# Patient Record
Sex: FEMALE | Race: WHITE | NOT HISPANIC OR LATINO | Employment: UNEMPLOYED | URBAN - METROPOLITAN AREA
[De-identification: names, ages, dates, MRNs, and addresses within clinical notes are randomized per-mention and may not be internally consistent; named-entity substitution may affect disease eponyms.]

---

## 2017-01-26 ENCOUNTER — CONVERSION ENCOUNTER (OUTPATIENT)
Dept: GENERAL RADIOLOGY | Facility: HOSPITAL | Age: 77
End: 2017-01-26

## 2018-05-31 ENCOUNTER — OFFICE VISIT CONVERTED (OUTPATIENT)
Dept: ORTHOPEDIC SURGERY | Facility: CLINIC | Age: 78
End: 2018-05-31
Attending: ORTHOPAEDIC SURGERY

## 2018-06-21 ENCOUNTER — OFFICE VISIT CONVERTED (OUTPATIENT)
Dept: ORTHOPEDIC SURGERY | Facility: CLINIC | Age: 78
End: 2018-06-21
Attending: ORTHOPAEDIC SURGERY

## 2018-08-21 ENCOUNTER — OFFICE VISIT CONVERTED (OUTPATIENT)
Dept: ORTHOPEDIC SURGERY | Facility: CLINIC | Age: 78
End: 2018-08-21
Attending: ORTHOPAEDIC SURGERY

## 2018-09-11 ENCOUNTER — CONVERSION ENCOUNTER (OUTPATIENT)
Dept: ORTHOPEDIC SURGERY | Facility: CLINIC | Age: 78
End: 2018-09-11

## 2018-09-11 ENCOUNTER — OFFICE VISIT CONVERTED (OUTPATIENT)
Dept: ORTHOPEDIC SURGERY | Facility: CLINIC | Age: 78
End: 2018-09-11
Attending: ORTHOPAEDIC SURGERY

## 2018-11-13 ENCOUNTER — OFFICE VISIT CONVERTED (OUTPATIENT)
Dept: ORTHOPEDIC SURGERY | Facility: CLINIC | Age: 78
End: 2018-11-13
Attending: PHYSICIAN ASSISTANT

## 2018-11-13 ENCOUNTER — CONVERSION ENCOUNTER (OUTPATIENT)
Dept: ORTHOPEDIC SURGERY | Facility: CLINIC | Age: 78
End: 2018-11-13

## 2018-12-04 ENCOUNTER — OFFICE VISIT CONVERTED (OUTPATIENT)
Dept: ORTHOPEDIC SURGERY | Facility: CLINIC | Age: 78
End: 2018-12-04
Attending: PHYSICIAN ASSISTANT

## 2019-01-09 ENCOUNTER — HOSPITAL ENCOUNTER (OUTPATIENT)
Dept: LAB | Facility: HOSPITAL | Age: 79
Discharge: HOME | End: 2019-01-09
Attending: INTERNAL MEDICINE

## 2019-01-09 ENCOUNTER — HOSPITAL ENCOUNTER (OUTPATIENT)
Dept: GENERAL RADIOLOGY | Facility: HOSPITAL | Age: 79
Discharge: HOME | End: 2019-01-09
Attending: PHYSICIAN ASSISTANT

## 2019-01-09 LAB
25(OH)D3 SERPL-MCNC: 54.6 NG/ML (ref 30–100)
ANION GAP SERPL CALC-SCNC: 18 MMOL/L (ref 8–19)
BUN SERPL-MCNC: 13 MG/DL (ref 5–25)
BUN/CREAT SERPL: 14 {RATIO} (ref 6–20)
CALCIUM SERPL-MCNC: 9.1 MG/DL (ref 8.7–10.4)
CHLORIDE SERPL-SCNC: 101 MMOL/L (ref 99–111)
CONV CO2: 27 MMOL/L (ref 22–32)
CREAT UR-MCNC: 0.9 MG/DL (ref 0.5–0.9)
GFR SERPLBLD BASED ON 1.73 SQ M-ARVRAT: >60 ML/MIN/{1.73_M2}
GLUCOSE SERPL-MCNC: 97 MG/DL (ref 65–99)
OSMOLALITY SERPL CALC.SUM OF ELEC: 294 MOSM/KG (ref 273–304)
POTASSIUM SERPL-SCNC: 3.5 MMOL/L (ref 3.5–5.3)
SODIUM SERPL-SCNC: 142 MMOL/L (ref 135–147)

## 2019-01-15 ENCOUNTER — OFFICE VISIT CONVERTED (OUTPATIENT)
Dept: ORTHOPEDIC SURGERY | Facility: CLINIC | Age: 79
End: 2019-01-15
Attending: PHYSICIAN ASSISTANT

## 2019-01-18 ENCOUNTER — HOSPITAL ENCOUNTER (OUTPATIENT)
Dept: GENERAL RADIOLOGY | Facility: HOSPITAL | Age: 79
Discharge: HOME | End: 2019-01-18
Attending: INTERNAL MEDICINE

## 2019-01-28 ENCOUNTER — HOSPITAL ENCOUNTER (OUTPATIENT)
Dept: INFUSION THERAPY | Facility: HOSPITAL | Age: 79
Setting detail: RECURRING SERIES
Discharge: HOME | End: 2019-01-31
Attending: INTERNAL MEDICINE

## 2019-02-04 ENCOUNTER — HOSPITAL ENCOUNTER (OUTPATIENT)
Dept: PHYSICAL THERAPY | Facility: CLINIC | Age: 79
Setting detail: RECURRING SERIES
Discharge: HOME | End: 2019-02-28
Attending: INTERNAL MEDICINE

## 2019-03-11 ENCOUNTER — HOSPITAL ENCOUNTER (OUTPATIENT)
Dept: GENERAL RADIOLOGY | Facility: HOSPITAL | Age: 79
Discharge: HOME | End: 2019-03-11
Attending: PHYSICIAN ASSISTANT

## 2019-03-19 ENCOUNTER — OFFICE VISIT CONVERTED (OUTPATIENT)
Dept: ORTHOPEDIC SURGERY | Facility: CLINIC | Age: 79
End: 2019-03-19
Attending: PHYSICIAN ASSISTANT

## 2019-04-11 ENCOUNTER — HOSPITAL ENCOUNTER (OUTPATIENT)
Dept: GENERAL RADIOLOGY | Facility: HOSPITAL | Age: 79
Discharge: HOME | End: 2019-04-11
Attending: INTERNAL MEDICINE

## 2019-04-11 LAB
ALBUMIN SERPL-MCNC: 4.4 G/DL (ref 3.5–5)
ALBUMIN/GLOB SERPL: 1.6 {RATIO} (ref 1.4–2.6)
ALP SERPL-CCNC: 62 U/L (ref 43–160)
ALT SERPL-CCNC: 16 U/L (ref 10–40)
ANION GAP SERPL CALC-SCNC: 18 MMOL/L (ref 8–19)
AST SERPL-CCNC: 26 U/L (ref 15–50)
BASOPHILS # BLD AUTO: 0.08 10*3/UL (ref 0–0.2)
BASOPHILS NFR BLD AUTO: 1.7 % (ref 0–3)
BILIRUB SERPL-MCNC: 0.37 MG/DL (ref 0.2–1.3)
BUN SERPL-MCNC: 18 MG/DL (ref 5–25)
BUN/CREAT SERPL: 24 {RATIO} (ref 6–20)
CALCIUM SERPL-MCNC: 9 MG/DL (ref 8.7–10.4)
CHLORIDE SERPL-SCNC: 99 MMOL/L (ref 99–111)
CHOLEST SERPL-MCNC: 242 MG/DL (ref 107–200)
CHOLEST/HDLC SERPL: 3.1 {RATIO} (ref 3–6)
CONV ABS IMM GRAN: 0.01 10*3/UL (ref 0–0.2)
CONV CO2: 26 MMOL/L (ref 22–32)
CONV IMMATURE GRAN: 0.2 % (ref 0–1.8)
CONV TOTAL PROTEIN: 7.2 G/DL (ref 6.3–8.2)
CREAT UR-MCNC: 0.75 MG/DL (ref 0.5–0.9)
DEPRECATED RDW RBC AUTO: 45.3 FL (ref 36.4–46.3)
EOSINOPHIL # BLD AUTO: 0.2 10*3/UL (ref 0–0.7)
EOSINOPHIL # BLD AUTO: 4.2 % (ref 0–7)
ERYTHROCYTE [DISTWIDTH] IN BLOOD BY AUTOMATED COUNT: 13 % (ref 11.7–14.4)
GFR SERPLBLD BASED ON 1.73 SQ M-ARVRAT: >60 ML/MIN/{1.73_M2}
GLOBULIN UR ELPH-MCNC: 2.8 G/DL (ref 2–3.5)
GLUCOSE SERPL-MCNC: 90 MG/DL (ref 65–99)
HBA1C MFR BLD: 11.9 G/DL (ref 12–16)
HCT VFR BLD AUTO: 36.5 % (ref 37–47)
HDLC SERPL-MCNC: 78 MG/DL (ref 40–60)
LDLC SERPL CALC-MCNC: 141 MG/DL (ref 70–100)
LYMPHOCYTES # BLD AUTO: 1.83 10*3/UL (ref 1–5)
MCH RBC QN AUTO: 31.1 PG (ref 27–31)
MCHC RBC AUTO-ENTMCNC: 32.6 G/DL (ref 33–37)
MCV RBC AUTO: 95.3 FL (ref 81–99)
MONOCYTES # BLD AUTO: 0.46 10*3/UL (ref 0.2–1.2)
MONOCYTES NFR BLD AUTO: 9.7 % (ref 3–10)
NEUTROPHILS # BLD AUTO: 2.18 10*3/UL (ref 2–8)
NEUTROPHILS NFR BLD AUTO: 45.8 % (ref 30–85)
NRBC CBCN: 0 % (ref 0–0.7)
OSMOLALITY SERPL CALC.SUM OF ELEC: 289 MOSM/KG (ref 273–304)
PLATELET # BLD AUTO: 266 10*3/UL (ref 130–400)
PMV BLD AUTO: 10.5 FL (ref 9.4–12.3)
POTASSIUM SERPL-SCNC: 3.5 MMOL/L (ref 3.5–5.3)
PTH-INTACT SERPL-MCNC: 48.1 PG/ML (ref 11.1–79.5)
RBC # BLD AUTO: 3.83 10*6/UL (ref 4.2–5.4)
SODIUM SERPL-SCNC: 139 MMOL/L (ref 135–147)
TRIGL SERPL-MCNC: 116 MG/DL (ref 40–150)
VARIANT LYMPHS NFR BLD MANUAL: 38.4 % (ref 20–45)
VLDLC SERPL-MCNC: 23 MG/DL (ref 5–37)
WBC # BLD AUTO: 4.76 10*3/UL (ref 4.8–10.8)

## 2019-04-20 ENCOUNTER — HOSPITAL ENCOUNTER (OUTPATIENT)
Dept: OTHER | Facility: HOSPITAL | Age: 79
Discharge: HOME | End: 2019-04-20
Attending: PHYSICAL MEDICINE & REHABILITATION

## 2019-04-20 LAB
25(OH)D3 SERPL-MCNC: 39.7 NG/ML (ref 30–100)
ALBUMIN SERPL-MCNC: 3.4 G/DL (ref 3.5–5)
ALBUMIN/GLOB SERPL: 1.4 {RATIO} (ref 1.4–2.6)
ALP SERPL-CCNC: 57 U/L (ref 43–160)
ALT SERPL-CCNC: <5 U/L (ref 10–40)
ANION GAP SERPL CALC-SCNC: 13 MMOL/L (ref 8–19)
AST SERPL-CCNC: 27 U/L (ref 15–50)
BASOPHILS # BLD AUTO: 0.03 10*3/UL (ref 0–0.2)
BASOPHILS NFR BLD AUTO: 0.5 % (ref 0–3)
BILIRUB SERPL-MCNC: 0.31 MG/DL (ref 0.2–1.3)
BUN SERPL-MCNC: 15 MG/DL (ref 5–25)
BUN/CREAT SERPL: 17 {RATIO} (ref 6–20)
CALCIUM SERPL-MCNC: 8.5 MG/DL (ref 8.7–10.4)
CHLORIDE SERPL-SCNC: 100 MMOL/L (ref 99–111)
CONV ABS IMM GRAN: 0.02 10*3/UL (ref 0–0.2)
CONV CO2: 27 MMOL/L (ref 22–32)
CONV IMMATURE GRAN: 0.3 % (ref 0–1.8)
CONV TOTAL PROTEIN: 5.9 G/DL (ref 6.3–8.2)
CREAT UR-MCNC: 0.86 MG/DL (ref 0.5–0.9)
DEPRECATED RDW RBC AUTO: 44.8 FL (ref 36.4–46.3)
EOSINOPHIL # BLD AUTO: 0.18 10*3/UL (ref 0–0.7)
EOSINOPHIL # BLD AUTO: 2.8 % (ref 0–7)
ERYTHROCYTE [DISTWIDTH] IN BLOOD BY AUTOMATED COUNT: 12.8 % (ref 11.7–14.4)
GFR SERPLBLD BASED ON 1.73 SQ M-ARVRAT: >60 ML/MIN/{1.73_M2}
GLOBULIN UR ELPH-MCNC: 2.5 G/DL (ref 2–3.5)
GLUCOSE SERPL-MCNC: 85 MG/DL (ref 65–99)
HBA1C MFR BLD: 9.4 G/DL (ref 12–16)
HCT VFR BLD AUTO: 28.3 % (ref 37–47)
LYMPHOCYTES # BLD AUTO: 0.95 10*3/UL (ref 1–5)
MCH RBC QN AUTO: 31.6 PG (ref 27–31)
MCHC RBC AUTO-ENTMCNC: 33.2 G/DL (ref 33–37)
MCV RBC AUTO: 95.3 FL (ref 81–99)
MONOCYTES # BLD AUTO: 0.69 10*3/UL (ref 0.2–1.2)
MONOCYTES NFR BLD AUTO: 10.6 % (ref 3–10)
NEUTROPHILS # BLD AUTO: 4.63 10*3/UL (ref 2–8)
NEUTROPHILS NFR BLD AUTO: 71.2 % (ref 30–85)
NRBC CBCN: 0 % (ref 0–0.7)
OSMOLALITY SERPL CALC.SUM OF ELEC: 282 MOSM/KG (ref 273–304)
PLATELET # BLD AUTO: 197 10*3/UL (ref 130–400)
PMV BLD AUTO: 10.9 FL (ref 9.4–12.3)
POTASSIUM SERPL-SCNC: 4.1 MMOL/L (ref 3.5–5.3)
RBC # BLD AUTO: 2.97 10*6/UL (ref 4.2–5.4)
SODIUM SERPL-SCNC: 136 MMOL/L (ref 135–147)
VARIANT LYMPHS NFR BLD MANUAL: 14.6 % (ref 20–45)
WBC # BLD AUTO: 6.5 10*3/UL (ref 4.8–10.8)

## 2019-04-22 ENCOUNTER — HOSPITAL ENCOUNTER (OUTPATIENT)
Dept: OTHER | Facility: HOSPITAL | Age: 79
Discharge: HOME | End: 2019-04-22

## 2019-04-22 LAB
BASOPHILS # BLD AUTO: 0.04 10*3/UL (ref 0–0.2)
BASOPHILS NFR BLD AUTO: 0.5 % (ref 0–3)
CONV ABS IMM GRAN: 0.03 10*3/UL (ref 0–0.2)
CONV IMMATURE GRAN: 0.4 % (ref 0–1.8)
DEPRECATED RDW RBC AUTO: 44.9 FL (ref 36.4–46.3)
EOSINOPHIL # BLD AUTO: 0.4 10*3/UL (ref 0–0.7)
EOSINOPHIL # BLD AUTO: 5.4 % (ref 0–7)
ERYTHROCYTE [DISTWIDTH] IN BLOOD BY AUTOMATED COUNT: 12.7 % (ref 11.7–14.4)
HBA1C MFR BLD: 10.2 G/DL (ref 12–16)
HCT VFR BLD AUTO: 32.2 % (ref 37–47)
LYMPHOCYTES # BLD AUTO: 2.25 10*3/UL (ref 1–5)
MCH RBC QN AUTO: 30.4 PG (ref 27–31)
MCHC RBC AUTO-ENTMCNC: 31.7 G/DL (ref 33–37)
MCV RBC AUTO: 95.8 FL (ref 81–99)
MONOCYTES # BLD AUTO: 0.82 10*3/UL (ref 0.2–1.2)
MONOCYTES NFR BLD AUTO: 11 % (ref 3–10)
NEUTROPHILS # BLD AUTO: 3.93 10*3/UL (ref 2–8)
NEUTROPHILS NFR BLD AUTO: 52.6 % (ref 30–85)
NRBC CBCN: 0 % (ref 0–0.7)
PLATELET # BLD AUTO: 262 10*3/UL (ref 130–400)
PMV BLD AUTO: 10.5 FL (ref 9.4–12.3)
RBC # BLD AUTO: 3.36 10*6/UL (ref 4.2–5.4)
VARIANT LYMPHS NFR BLD MANUAL: 30.1 % (ref 20–45)
WBC # BLD AUTO: 7.47 10*3/UL (ref 4.8–10.8)

## 2019-04-29 ENCOUNTER — HOSPITAL ENCOUNTER (OUTPATIENT)
Dept: GENERAL RADIOLOGY | Facility: HOSPITAL | Age: 79
Discharge: HOME | End: 2019-04-29
Attending: PHYSICIAN ASSISTANT

## 2019-04-30 ENCOUNTER — OFFICE VISIT CONVERTED (OUTPATIENT)
Dept: ORTHOPEDIC SURGERY | Facility: CLINIC | Age: 79
End: 2019-04-30
Attending: ORTHOPAEDIC SURGERY

## 2019-05-07 ENCOUNTER — OFFICE VISIT CONVERTED (OUTPATIENT)
Dept: ORTHOPEDIC SURGERY | Facility: CLINIC | Age: 79
End: 2019-05-07
Attending: PHYSICIAN ASSISTANT

## 2019-06-11 ENCOUNTER — OFFICE VISIT CONVERTED (OUTPATIENT)
Dept: ORTHOPEDIC SURGERY | Facility: CLINIC | Age: 79
End: 2019-06-11
Attending: PHYSICIAN ASSISTANT

## 2019-07-01 ENCOUNTER — HOSPITAL ENCOUNTER (OUTPATIENT)
Dept: GENERAL RADIOLOGY | Facility: HOSPITAL | Age: 79
Discharge: HOME | End: 2019-07-01
Attending: INTERNAL MEDICINE

## 2019-07-01 LAB
ANION GAP SERPL CALC-SCNC: 16 MMOL/L (ref 8–19)
BASOPHILS # BLD AUTO: 0.05 10*3/UL (ref 0–0.2)
BASOPHILS NFR BLD AUTO: 1 % (ref 0–3)
BUN SERPL-MCNC: 15 MG/DL (ref 5–25)
BUN/CREAT SERPL: 19 {RATIO} (ref 6–20)
CALCIUM SERPL-MCNC: 9 MG/DL (ref 8.7–10.4)
CHLORIDE SERPL-SCNC: 100 MMOL/L (ref 99–111)
CONV ABS IMM GRAN: 0.01 10*3/UL (ref 0–0.2)
CONV CO2: 26 MMOL/L (ref 22–32)
CONV IMMATURE GRAN: 0.2 % (ref 0–1.8)
CREAT UR-MCNC: 0.81 MG/DL (ref 0.5–0.9)
DEPRECATED RDW RBC AUTO: 42.7 FL (ref 36.4–46.3)
EOSINOPHIL # BLD AUTO: 0.11 10*3/UL (ref 0–0.7)
EOSINOPHIL # BLD AUTO: 2.1 % (ref 0–7)
ERYTHROCYTE [DISTWIDTH] IN BLOOD BY AUTOMATED COUNT: 12.4 % (ref 11.7–14.4)
FERRITIN SERPL-MCNC: 41 NG/ML (ref 10–200)
GFR SERPLBLD BASED ON 1.73 SQ M-ARVRAT: >60 ML/MIN/{1.73_M2}
GLUCOSE SERPL-MCNC: 91 MG/DL (ref 65–99)
HBA1C MFR BLD: 12.2 G/DL (ref 12–16)
HCT VFR BLD AUTO: 37.7 % (ref 37–47)
IRON SATN MFR SERPL: 21 % (ref 20–55)
IRON SERPL-MCNC: 81 UG/DL (ref 60–170)
LYMPHOCYTES # BLD AUTO: 1.81 10*3/UL (ref 1–5)
MCH RBC QN AUTO: 30.4 PG (ref 27–31)
MCHC RBC AUTO-ENTMCNC: 32.4 G/DL (ref 33–37)
MCV RBC AUTO: 94 FL (ref 81–99)
MONOCYTES # BLD AUTO: 0.49 10*3/UL (ref 0.2–1.2)
MONOCYTES NFR BLD AUTO: 9.3 % (ref 3–10)
NEUTROPHILS # BLD AUTO: 2.78 10*3/UL (ref 2–8)
NEUTROPHILS NFR BLD AUTO: 52.9 % (ref 30–85)
NRBC CBCN: 0 % (ref 0–0.7)
OSMOLALITY SERPL CALC.SUM OF ELEC: 286 MOSM/KG (ref 273–304)
PLATELET # BLD AUTO: 225 10*3/UL (ref 130–400)
PMV BLD AUTO: 10.2 FL (ref 9.4–12.3)
POTASSIUM SERPL-SCNC: 4.1 MMOL/L (ref 3.5–5.3)
RBC # BLD AUTO: 4.01 10*6/UL (ref 4.2–5.4)
SODIUM SERPL-SCNC: 138 MMOL/L (ref 135–147)
TIBC SERPL-MCNC: 385 UG/DL (ref 245–450)
TRANSFERRIN SERPL-MCNC: 269 MG/DL (ref 250–380)
VARIANT LYMPHS NFR BLD MANUAL: 34.5 % (ref 20–45)
WBC # BLD AUTO: 5.25 10*3/UL (ref 4.8–10.8)

## 2019-07-23 ENCOUNTER — CONVERSION ENCOUNTER (OUTPATIENT)
Dept: ORTHOPEDIC SURGERY | Facility: CLINIC | Age: 79
End: 2019-07-23

## 2019-07-23 ENCOUNTER — OFFICE VISIT CONVERTED (OUTPATIENT)
Dept: ORTHOPEDIC SURGERY | Facility: CLINIC | Age: 79
End: 2019-07-23
Attending: PHYSICIAN ASSISTANT

## 2019-09-27 ENCOUNTER — OFFICE VISIT CONVERTED (OUTPATIENT)
Dept: ORTHOPEDIC SURGERY | Facility: CLINIC | Age: 79
End: 2019-09-27
Attending: ORTHOPAEDIC SURGERY

## 2019-09-27 ENCOUNTER — CONVERSION ENCOUNTER (OUTPATIENT)
Dept: ORTHOPEDIC SURGERY | Facility: CLINIC | Age: 79
End: 2019-09-27

## 2019-10-09 ENCOUNTER — HOSPITAL ENCOUNTER (OUTPATIENT)
Dept: LAB | Facility: HOSPITAL | Age: 79
Discharge: HOME | End: 2019-10-09
Attending: INTERNAL MEDICINE

## 2019-10-09 LAB
25(OH)D3 SERPL-MCNC: 67.6 NG/ML (ref 30–100)
ALBUMIN SERPL-MCNC: 4.4 G/DL (ref 3.5–5)
ALBUMIN/GLOB SERPL: 1.6 {RATIO} (ref 1.4–2.6)
ALP SERPL-CCNC: 65 U/L (ref 43–160)
ALT SERPL-CCNC: 11 U/L (ref 10–40)
ANION GAP SERPL CALC-SCNC: 17 MMOL/L (ref 8–19)
AST SERPL-CCNC: 19 U/L (ref 15–50)
BASOPHILS # BLD AUTO: 0.06 10*3/UL (ref 0–0.2)
BASOPHILS NFR BLD AUTO: 1.1 % (ref 0–3)
BILIRUB SERPL-MCNC: 0.34 MG/DL (ref 0.2–1.3)
BUN SERPL-MCNC: 18 MG/DL (ref 5–25)
BUN/CREAT SERPL: 21 {RATIO} (ref 6–20)
CALCIUM SERPL-MCNC: 9.3 MG/DL (ref 8.7–10.4)
CHLORIDE SERPL-SCNC: 103 MMOL/L (ref 99–111)
CHOLEST SERPL-MCNC: 227 MG/DL (ref 107–200)
CHOLEST/HDLC SERPL: 2.4 {RATIO} (ref 3–6)
CONV ABS IMM GRAN: 0.02 10*3/UL (ref 0–0.2)
CONV CO2: 25 MMOL/L (ref 22–32)
CONV IMMATURE GRAN: 0.4 % (ref 0–1.8)
CONV TOTAL PROTEIN: 7.2 G/DL (ref 6.3–8.2)
CREAT UR-MCNC: 0.84 MG/DL (ref 0.5–0.9)
DEPRECATED RDW RBC AUTO: 43.8 FL (ref 36.4–46.3)
EOSINOPHIL # BLD AUTO: 0.1 10*3/UL (ref 0–0.7)
EOSINOPHIL # BLD AUTO: 1.9 % (ref 0–7)
ERYTHROCYTE [DISTWIDTH] IN BLOOD BY AUTOMATED COUNT: 12.7 % (ref 11.7–14.4)
GFR SERPLBLD BASED ON 1.73 SQ M-ARVRAT: >60 ML/MIN/{1.73_M2}
GLOBULIN UR ELPH-MCNC: 2.8 G/DL (ref 2–3.5)
GLUCOSE SERPL-MCNC: 90 MG/DL (ref 65–99)
HCT VFR BLD AUTO: 36.2 % (ref 37–47)
HDLC SERPL-MCNC: 95 MG/DL (ref 40–60)
HGB BLD-MCNC: 11.6 G/DL (ref 12–16)
IRON SATN MFR SERPL: 27 % (ref 20–55)
IRON SERPL-MCNC: 108 UG/DL (ref 60–170)
LDLC SERPL CALC-MCNC: 121 MG/DL (ref 70–100)
LYMPHOCYTES # BLD AUTO: 1.93 10*3/UL (ref 1–5)
LYMPHOCYTES NFR BLD AUTO: 35.8 % (ref 20–45)
MCH RBC QN AUTO: 30.2 PG (ref 27–31)
MCHC RBC AUTO-ENTMCNC: 32 G/DL (ref 33–37)
MCV RBC AUTO: 94.3 FL (ref 81–99)
MONOCYTES # BLD AUTO: 0.54 10*3/UL (ref 0.2–1.2)
MONOCYTES NFR BLD AUTO: 10 % (ref 3–10)
NEUTROPHILS # BLD AUTO: 2.74 10*3/UL (ref 2–8)
NEUTROPHILS NFR BLD AUTO: 50.8 % (ref 30–85)
NRBC CBCN: 0 % (ref 0–0.7)
OSMOLALITY SERPL CALC.SUM OF ELEC: 293 MOSM/KG (ref 273–304)
PLATELET # BLD AUTO: 212 10*3/UL (ref 130–400)
PMV BLD AUTO: 10.2 FL (ref 9.4–12.3)
POTASSIUM SERPL-SCNC: 4 MMOL/L (ref 3.5–5.3)
PTH-INTACT SERPL-MCNC: 40.4 PG/ML (ref 11.1–79.5)
RBC # BLD AUTO: 3.84 10*6/UL (ref 4.2–5.4)
SODIUM SERPL-SCNC: 141 MMOL/L (ref 135–147)
TIBC SERPL-MCNC: 396 UG/DL (ref 245–450)
TRANSFERRIN SERPL-MCNC: 277 MG/DL (ref 250–380)
TRIGL SERPL-MCNC: 55 MG/DL (ref 40–150)
VLDLC SERPL-MCNC: 11 MG/DL (ref 5–37)
WBC # BLD AUTO: 5.39 10*3/UL (ref 4.8–10.8)

## 2019-11-22 ENCOUNTER — HOSPITAL ENCOUNTER (OUTPATIENT)
Dept: GENERAL RADIOLOGY | Facility: HOSPITAL | Age: 79
Discharge: HOME | End: 2019-11-22
Attending: INTERNAL MEDICINE

## 2020-02-04 ENCOUNTER — HOSPITAL ENCOUNTER (OUTPATIENT)
Dept: INFUSION THERAPY | Facility: HOSPITAL | Age: 80
Discharge: HOME | End: 2020-02-04
Attending: INTERNAL MEDICINE

## 2020-02-04 LAB
ANION GAP SERPL CALC-SCNC: 15 MMOL/L (ref 8–19)
BUN SERPL-MCNC: 14 MG/DL (ref 5–25)
BUN/CREAT SERPL: 18 {RATIO} (ref 6–20)
CALCIUM SERPL-MCNC: 9 MG/DL (ref 8.7–10.4)
CHLORIDE SERPL-SCNC: 103 MMOL/L (ref 99–111)
CONV CO2: 25 MMOL/L (ref 22–32)
CREAT UR-MCNC: 0.77 MG/DL (ref 0.5–0.9)
GFR SERPLBLD BASED ON 1.73 SQ M-ARVRAT: >60 ML/MIN/{1.73_M2}
GLUCOSE SERPL-MCNC: 93 MG/DL (ref 65–99)
OSMOLALITY SERPL CALC.SUM OF ELEC: 288 MOSM/KG (ref 273–304)
POTASSIUM SERPL-SCNC: 3.9 MMOL/L (ref 3.5–5.3)
SODIUM SERPL-SCNC: 139 MMOL/L (ref 135–147)

## 2020-04-08 ENCOUNTER — HOSPITAL ENCOUNTER (OUTPATIENT)
Dept: GENERAL RADIOLOGY | Facility: HOSPITAL | Age: 80
Discharge: HOME | End: 2020-04-08
Attending: INTERNAL MEDICINE

## 2020-04-08 LAB
25(OH)D3 SERPL-MCNC: 57 NG/ML (ref 30–100)
ANION GAP SERPL CALC-SCNC: 20 MMOL/L (ref 8–19)
BASOPHILS # BLD AUTO: 0.07 10*3/UL (ref 0–0.2)
BASOPHILS NFR BLD AUTO: 1.1 % (ref 0–3)
BUN SERPL-MCNC: 19 MG/DL (ref 5–25)
BUN/CREAT SERPL: 19 {RATIO} (ref 6–20)
CALCIUM SERPL-MCNC: 9.6 MG/DL (ref 8.7–10.4)
CHLORIDE SERPL-SCNC: 102 MMOL/L (ref 99–111)
CONV ABS IMM GRAN: 0.02 10*3/UL (ref 0–0.2)
CONV CO2: 23 MMOL/L (ref 22–32)
CONV IMMATURE GRAN: 0.3 % (ref 0–1.8)
CREAT UR-MCNC: 1.01 MG/DL (ref 0.5–0.9)
DEPRECATED RDW RBC AUTO: 41.2 FL (ref 36.4–46.3)
EOSINOPHIL # BLD AUTO: 0.15 10*3/UL (ref 0–0.7)
EOSINOPHIL # BLD AUTO: 2.4 % (ref 0–7)
ERYTHROCYTE [DISTWIDTH] IN BLOOD BY AUTOMATED COUNT: 11.9 % (ref 11.7–14.4)
GFR SERPLBLD BASED ON 1.73 SQ M-ARVRAT: 53 ML/MIN/{1.73_M2}
GLUCOSE SERPL-MCNC: 101 MG/DL (ref 65–99)
HCT VFR BLD AUTO: 36.9 % (ref 37–47)
HGB BLD-MCNC: 11.9 G/DL (ref 12–16)
LYMPHOCYTES # BLD AUTO: 2.27 10*3/UL (ref 1–5)
LYMPHOCYTES NFR BLD AUTO: 37 % (ref 20–45)
MCH RBC QN AUTO: 30.2 PG (ref 27–31)
MCHC RBC AUTO-ENTMCNC: 32.2 G/DL (ref 33–37)
MCV RBC AUTO: 93.7 FL (ref 81–99)
MONOCYTES # BLD AUTO: 0.59 10*3/UL (ref 0.2–1.2)
MONOCYTES NFR BLD AUTO: 9.6 % (ref 3–10)
NEUTROPHILS # BLD AUTO: 3.04 10*3/UL (ref 2–8)
NEUTROPHILS NFR BLD AUTO: 49.6 % (ref 30–85)
NRBC CBCN: 0 % (ref 0–0.7)
OSMOLALITY SERPL CALC.SUM OF ELEC: 294 MOSM/KG (ref 273–304)
PLATELET # BLD AUTO: 303 10*3/UL (ref 130–400)
PMV BLD AUTO: 10.4 FL (ref 9.4–12.3)
POTASSIUM SERPL-SCNC: 4.1 MMOL/L (ref 3.5–5.3)
RBC # BLD AUTO: 3.94 10*6/UL (ref 4.2–5.4)
SODIUM SERPL-SCNC: 141 MMOL/L (ref 135–147)
WBC # BLD AUTO: 6.14 10*3/UL (ref 4.8–10.8)

## 2020-05-14 ENCOUNTER — OFFICE VISIT CONVERTED (OUTPATIENT)
Dept: ORTHOPEDIC SURGERY | Facility: CLINIC | Age: 80
End: 2020-05-14
Attending: ORTHOPAEDIC SURGERY

## 2020-10-02 ENCOUNTER — HOSPITAL ENCOUNTER (OUTPATIENT)
Dept: LAB | Facility: HOSPITAL | Age: 80
Discharge: HOME | End: 2020-10-02
Attending: INTERNAL MEDICINE

## 2020-10-02 ENCOUNTER — HOSPITAL ENCOUNTER (OUTPATIENT)
Dept: GENERAL RADIOLOGY | Facility: HOSPITAL | Age: 80
Discharge: HOME | End: 2020-10-02
Attending: INTERNAL MEDICINE

## 2020-10-02 LAB
25(OH)D3 SERPL-MCNC: 58.3 NG/ML (ref 30–100)
ALBUMIN SERPL-MCNC: 4.1 G/DL (ref 3.5–5)
ALBUMIN/GLOB SERPL: 1.3 {RATIO} (ref 1.4–2.6)
ALP SERPL-CCNC: 85 U/L (ref 43–160)
ALT SERPL-CCNC: 10 U/L (ref 10–40)
ANION GAP SERPL CALC-SCNC: 19 MMOL/L (ref 8–19)
AST SERPL-CCNC: 21 U/L (ref 15–50)
BASOPHILS # BLD AUTO: 0.06 10*3/UL (ref 0–0.2)
BASOPHILS NFR BLD AUTO: 0.7 % (ref 0–3)
BILIRUB SERPL-MCNC: 0.34 MG/DL (ref 0.2–1.3)
BUN SERPL-MCNC: 14 MG/DL (ref 5–25)
BUN/CREAT SERPL: 15 {RATIO} (ref 6–20)
CALCIUM SERPL-MCNC: 9.2 MG/DL (ref 8.7–10.4)
CHLORIDE SERPL-SCNC: 101 MMOL/L (ref 99–111)
CONV ABS IMM GRAN: 0.03 10*3/UL (ref 0–0.2)
CONV CO2: 22 MMOL/L (ref 22–32)
CONV IMMATURE GRAN: 0.4 % (ref 0–1.8)
CONV TOTAL PROTEIN: 7.2 G/DL (ref 6.3–8.2)
CREAT UR-MCNC: 0.93 MG/DL (ref 0.5–0.9)
DEPRECATED RDW RBC AUTO: 41.9 FL (ref 36.4–46.3)
EOSINOPHIL # BLD AUTO: 0.15 10*3/UL (ref 0–0.7)
EOSINOPHIL # BLD AUTO: 1.8 % (ref 0–7)
ERYTHROCYTE [DISTWIDTH] IN BLOOD BY AUTOMATED COUNT: 12.1 % (ref 11.7–14.4)
GFR SERPLBLD BASED ON 1.73 SQ M-ARVRAT: 58 ML/MIN/{1.73_M2}
GLOBULIN UR ELPH-MCNC: 3.1 G/DL (ref 2–3.5)
GLUCOSE SERPL-MCNC: 101 MG/DL (ref 65–99)
HCT VFR BLD AUTO: 33.9 % (ref 37–47)
HGB BLD-MCNC: 11.2 G/DL (ref 12–16)
LYMPHOCYTES # BLD AUTO: 1.4 10*3/UL (ref 1–5)
LYMPHOCYTES NFR BLD AUTO: 16.9 % (ref 20–45)
MCH RBC QN AUTO: 30.9 PG (ref 27–31)
MCHC RBC AUTO-ENTMCNC: 33 G/DL (ref 33–37)
MCV RBC AUTO: 93.6 FL (ref 81–99)
MONOCYTES # BLD AUTO: 0.89 10*3/UL (ref 0.2–1.2)
MONOCYTES NFR BLD AUTO: 10.7 % (ref 3–10)
NEUTROPHILS # BLD AUTO: 5.76 10*3/UL (ref 2–8)
NEUTROPHILS NFR BLD AUTO: 69.5 % (ref 30–85)
NRBC CBCN: 0 % (ref 0–0.7)
OSMOLALITY SERPL CALC.SUM OF ELEC: 287 MOSM/KG (ref 273–304)
PLATELET # BLD AUTO: 252 10*3/UL (ref 130–400)
PMV BLD AUTO: 9.8 FL (ref 9.4–12.3)
POTASSIUM SERPL-SCNC: 4 MMOL/L (ref 3.5–5.3)
RBC # BLD AUTO: 3.62 10*6/UL (ref 4.2–5.4)
SODIUM SERPL-SCNC: 138 MMOL/L (ref 135–147)
T4 FREE SERPL-MCNC: 1 NG/DL (ref 0.9–1.8)
TSH SERPL-ACNC: 2.27 M[IU]/L (ref 0.27–4.2)
WBC # BLD AUTO: 8.29 10*3/UL (ref 4.8–10.8)

## 2020-10-03 LAB — PTH-INTACT SERPL-MCNC: 36 PG/ML (ref 15–65)

## 2020-10-12 ENCOUNTER — HOSPITAL ENCOUNTER (OUTPATIENT)
Dept: GENERAL RADIOLOGY | Facility: HOSPITAL | Age: 80
Discharge: HOME | End: 2020-10-12
Attending: INTERNAL MEDICINE

## 2020-12-18 ENCOUNTER — OFFICE VISIT CONVERTED (OUTPATIENT)
Dept: ORTHOPEDIC SURGERY | Facility: CLINIC | Age: 80
End: 2020-12-18
Attending: ORTHOPAEDIC SURGERY

## 2021-04-15 ENCOUNTER — HOSPITAL ENCOUNTER (OUTPATIENT)
Dept: GENERAL RADIOLOGY | Facility: HOSPITAL | Age: 81
Discharge: HOME | End: 2021-04-15
Attending: INTERNAL MEDICINE

## 2021-04-15 LAB
ALBUMIN SERPL-MCNC: 4.4 G/DL (ref 3.5–5)
ALBUMIN/GLOB SERPL: 1.6 {RATIO} (ref 1.4–2.6)
ALP SERPL-CCNC: 70 U/L (ref 43–160)
ALT SERPL-CCNC: 14 U/L (ref 10–40)
ANION GAP SERPL CALC-SCNC: 16 MMOL/L (ref 8–19)
AST SERPL-CCNC: 25 U/L (ref 15–50)
BASOPHILS # BLD AUTO: 0.06 10*3/UL (ref 0–0.2)
BASOPHILS NFR BLD AUTO: 1.2 % (ref 0–3)
BILIRUB SERPL-MCNC: 0.38 MG/DL (ref 0.2–1.3)
BUN SERPL-MCNC: 16 MG/DL (ref 5–25)
BUN/CREAT SERPL: 18 {RATIO} (ref 6–20)
CALCIUM SERPL-MCNC: 9 MG/DL (ref 8.7–10.4)
CHLORIDE SERPL-SCNC: 103 MMOL/L (ref 99–111)
CHOLEST SERPL-MCNC: 251 MG/DL (ref 107–200)
CHOLEST/HDLC SERPL: 3 {RATIO} (ref 3–6)
CONV ABS IMM GRAN: 0.01 10*3/UL (ref 0–0.2)
CONV CO2: 26 MMOL/L (ref 22–32)
CONV IMMATURE GRAN: 0.2 % (ref 0–1.8)
CONV TOTAL PROTEIN: 7.2 G/DL (ref 6.3–8.2)
CREAT UR-MCNC: 0.9 MG/DL (ref 0.5–0.9)
DEPRECATED RDW RBC AUTO: 44 FL (ref 36.4–46.3)
EOSINOPHIL # BLD AUTO: 0.14 10*3/UL (ref 0–0.7)
EOSINOPHIL # BLD AUTO: 2.8 % (ref 0–7)
ERYTHROCYTE [DISTWIDTH] IN BLOOD BY AUTOMATED COUNT: 12.6 % (ref 11.7–14.4)
FERRITIN SERPL-MCNC: 61 NG/ML (ref 10–200)
GFR SERPLBLD BASED ON 1.73 SQ M-ARVRAT: >60 ML/MIN/{1.73_M2}
GLOBULIN UR ELPH-MCNC: 2.8 G/DL (ref 2–3.5)
GLUCOSE SERPL-MCNC: 87 MG/DL (ref 65–99)
HCT VFR BLD AUTO: 37.1 % (ref 37–47)
HDLC SERPL-MCNC: 83 MG/DL (ref 40–60)
HGB BLD-MCNC: 12 G/DL (ref 12–16)
IRON SATN MFR SERPL: 29 % (ref 20–55)
IRON SERPL-MCNC: 106 UG/DL (ref 60–170)
LDLC SERPL CALC-MCNC: 147 MG/DL (ref 70–100)
LYMPHOCYTES # BLD AUTO: 1.36 10*3/UL (ref 1–5)
LYMPHOCYTES NFR BLD AUTO: 27.4 % (ref 20–45)
MCH RBC QN AUTO: 30.8 PG (ref 27–31)
MCHC RBC AUTO-ENTMCNC: 32.3 G/DL (ref 33–37)
MCV RBC AUTO: 95.4 FL (ref 81–99)
MONOCYTES # BLD AUTO: 0.48 10*3/UL (ref 0.2–1.2)
MONOCYTES NFR BLD AUTO: 9.7 % (ref 3–10)
NEUTROPHILS # BLD AUTO: 2.92 10*3/UL (ref 2–8)
NEUTROPHILS NFR BLD AUTO: 58.7 % (ref 30–85)
NRBC CBCN: 0 % (ref 0–0.7)
OSMOLALITY SERPL CALC.SUM OF ELEC: 293 MOSM/KG (ref 273–304)
PLATELET # BLD AUTO: 254 10*3/UL (ref 130–400)
PMV BLD AUTO: 10.5 FL (ref 9.4–12.3)
POTASSIUM SERPL-SCNC: 4 MMOL/L (ref 3.5–5.3)
RBC # BLD AUTO: 3.89 10*6/UL (ref 4.2–5.4)
SODIUM SERPL-SCNC: 141 MMOL/L (ref 135–147)
TIBC SERPL-MCNC: 365 UG/DL (ref 245–450)
TRANSFERRIN SERPL-MCNC: 255 MG/DL (ref 250–380)
TRIGL SERPL-MCNC: 103 MG/DL (ref 40–150)
VLDLC SERPL-MCNC: 21 MG/DL (ref 5–37)
WBC # BLD AUTO: 4.97 10*3/UL (ref 4.8–10.8)

## 2021-05-10 NOTE — H&P
History and Physical      Patient Name: Breanna York   Patient ID: [id number]   Sex: Female   YOB: 1940        Visit Date: December 18, 2020    Provider: Candice Vo MD   Location: [facility]   Location Address: [address] [place] [address]  [id number]   Location Phone: [phone]          Chief Complaint  · Left Knee Pain      History Of Present Illness  Breanna York is a 80 year old /White female who presents today to [facility].      Patient presents today for an evaluation of left knee pain. Patient has been having left knee pain that has been on and off several years. Patient states that recently her pain has gotten increasingly worse the last few months. She states that the pain is on the back off her knee that radiates to the back of her leg and into her toes. She says she has to be careful when she stands up to walk because her knee will buckle. She can't full extend and flex her knee but somedays it won't due to pain.       Past Medical History  Anemia, Unspecified; Arthritis; Cancer; Hyperlipemia; Hypertension; Impingement syndrome of right shoulder; Limb Swelling; Primary osteoarthritis of both hips; Primary osteoarthritis of right knee; Thyroid disorder; Ulcer         Past Surgical History  Artificial Joints/Limbs; Colonoscopy; Eye Implant; Hysterectomy; Joint Surgery         Medication List  amlodipine 5 mg oral tablet; Neurontin 100 mg oral capsule; Vitamin D3 50 mcg (2,000 unit) oral tablet; Voltaren 1 % topical gel         Allergy List  Demerol; Latex Exam Gloves       Allergies Reconciled  Family Medical History  Heart Disease; Cancer, Unspecified; Family history of certain chronic disabling diseases; arthritis; Osteoporosis; Family history of Arthritis         Social History  Alcohol Use (Never); lives alone; Recreational Drug Use (Never); Retired.; Tobacco (Never);          Review of Systems  · Constitutional  o Denies  o : fever,  "chills, weight loss  · Cardiovascular  o Denies  o : chest pain, shortness of breath  · Gastrointestinal  o Denies  o : liver disease, heartburn, nausea, blood in stools  · Genitourinary  o Denies  o : painful urination, blood in urine  · Integument  o Denies  o : rash, itching  · Neurologic  o Denies  o : headache, weakness, loss of consciousness  · Musculoskeletal  o Denies  o : painful, swollen joints  · Psychiatric  o Denies  o : drug/alcohol addiction, anxiety, depression      Vitals  Date Time BP Position Site L\R Cuff Size HR RR TEMP (F) WT  HT  BMI kg/m2 BSA m2 O2 Sat FR L/min FiO2        12/18/2020 09:28 AM      97 - R   125lbs 0oz 5'  2\" 22.86 1.57 99 %            Physical Examination  · Constitutional  o Appearance  o : well developed, well-nourished, no obvious deformities present  · Head and Face  o Head  o :   § Inspection  § : normocephalic  o Face  o :   § Inspection  § : no facial lesions  · Eyes  o Conjunctivae  o : conjunctivae normal  o Sclerae  o : sclerae white  · Ears, Nose, Mouth and Throat  o Ears  o :   § External Ears  § : appearance within normal limits  § Hearing  § : intact  o Nose  o :   § External Nose  § : appearance normal  · Neck  o Inspection/Palpation  o : normal appearance  o Range of Motion  o : full range of motion  · Respiratory  o Respiratory Effort  o : breathing unlabored  o Inspection of Chest  o : normal appearance  o Auscultation of Lungs  o : no audible wheezing or rales  · Cardiovascular  o Heart  o : regular rate  · Gastrointestinal  o Abdominal Examination  o : soft and non-tender  · Skin and Subcutaneous Tissue  o General Inspection  o : intact, no rashes  · Psychiatric  o General  o : Alert and oriented x3  o Judgement and Insight  o : judgment and insight intact  o Mood and Affect  o : mood normal, affect appropriate  · Left Knee  o Inspection  o : Sensation grossly intact. Neurovascular intact. Skin intact. Calf supple, non-tender. Good strength in quadriceps, " hamstrings, dorsiflexors, and plantar flexors. Full weightbearing. Dorsal Pedal Pulse 2+, posterior tibialis pulse 2+. Near full flexion and extension. Hamstring tenderness. No swelling, skin discoloration or atrophy. Antalgic gait. No tenderness at medial joint line, no tenderness at lateral joint line, no patellar tendon tenderness, no pain of MCL, no pain at LCL.   · Injection Note/Aspiration Note  o Site  o : IM  o Procedure  o : Procedure: After educating the patient, patient gave consent for the procedure. After using alcohol prep, injection was given. The patient tolerated the procedure well.   o Medication  o : 2ml's of 4 mg Dexamethasone   · In Office Procedures  o View  o : LAT/SUNRISE/STANDING   o Site  o : left, knee  o Indication  o : Left knee pain   o Study  o : X-rays ordered, taken in the office, and reviewed today.  o Xray  o : Degenerative changes present that is consistent with osteoarthritis. No fracture or dislocation.           Assessment  · Primary osteoarthritis of right knee     715.16/M17.11  · Left knee pain, unspecified chronicity     719.46/M25.562  · Left Hamstring Tendinitis     726.90/M77.9      Plan  · Orders  o 2.00 - Dexamethasone Injection 8mg (-7) - - 12/18/2020   Lot [id number] Exp 12 2021 [employer] Administered by PAUL Parra RT R  o IM - Injection Fee [facility] ([id number]) - - 12/18/2020  o Knee (Left) [facility] Preferred View ([id number]) - 719.46/M25.562 - 12/18/2020  · Medications  o Medications have been Reconciled  o Transition of Care or Provider Policy  · Instructions  o Dr. Vo saw and examined the patient and agrees with plan.   o X-rays reviewed by Dr. Vo.  o Reviewed the patient's Past Medical, Social, and Family history as well as the ROS at today's visit, no changes.  o Call or return if worsening symptoms.  o Exercise handout given.  o Follow Up PRN.  o This note was transcribed by Marija Ordaz.   o Discussed diagnosis and treatment options with the patient. Patient  opted for Diclofenac gel. Patient opted for an injection and tolerated it well. Patient given some at home exercises.             Electronically Signed by: Marija Ordaz-, Other -Author on December 18, 2020 01:07:06 PM  Electronically Co-signed by: Candice Vo MD -Reviewer on December 18, 2020 03:07:42 PM

## 2021-05-13 NOTE — PROGRESS NOTES
Progress Note      Patient Name: Breanna York   Patient ID: [id number]   Sex: Female   YOB: 1940        Visit Date: May 14, 2020    Provider: Candice Vo MD   Location: [facility]   Location Address: [address] [place] [address]  [id number]   Location Phone: [phone]          Chief Complaint  · Follow up Right Reverse Total Shoulder       History Of Present Illness  Breanna York is a 79 year old /White female who presents today to [facility].      She's here for evaluation of her shoulder. She underwent reverse total shoulder about a year ago. She's pleased with the results. She says it's still tight in some areas, but she can get her hand above her head.       Past Medical History  Anemia, Unspecified; Arthritis; Cancer; Hyperlipemia; Hypertension; Impingement syndrome of right shoulder; Limb Swelling; Primary osteoarthritis of both hips; Primary osteoarthritis of right knee; Thyroid disorder; Ulcer         Past Surgical History  Artificial Joints/Limbs; Colonoscopy; Eye Implant; Hysterectomy; Joint Surgery         Medication List  amlodipine 2.5 mg oral tablet; amlodipine 5 mg oral tablet; diclofenac sodium 75 mg oral tablet,delayed release (DR/EC); gabapentin 100 mg oral capsule; Neurontin 100 mg oral capsule; Shingrix (PF) 50 mcg/0.5 mL intramuscular suspension for reconstitution; temazepam 7.5 mg oral capsule; Vitamin D3 50 mcg (2,000 unit) oral tablet         Allergy List  Demerol; Latex Exam Gloves         Family Medical History  Heart Disease; Cancer, Unspecified; Family history of certain chronic disabling diseases; arthritis; Osteoporosis; Family history of Arthritis         Social History  Alcohol Use (Never); lives alone; Recreational Drug Use (Never); Retired.; Tobacco (Never);          Review of Systems  · Constitutional  o Denies  o : fever, chills, weight loss  · Cardiovascular  o Denies  o : chest pain, shortness of  "breath  · Gastrointestinal  o Denies  o : liver disease, heartburn, nausea, blood in stools  · Genitourinary  o Denies  o : painful urination, blood in urine  · Integument  o Denies  o : rash, itching  · Neurologic  o Denies  o : headache, weakness, loss of consciousness  · Musculoskeletal  o Admits  o : painful, swollen joints  · Psychiatric  o Denies  o : drug/alcohol addiction, anxiety, depression      Vitals  Date Time BP Position Site L\R Cuff Size HR RR TEMP (F) WT  HT  BMI kg/m2 BSA m2 O2 Sat        05/14/2020 08:29 AM      79 - R   126lbs 16oz 5'  2\" 23.23 1.59 97 %          Physical Examination  · Constitutional  o Appearance  o : well developed, well-nourished, no obvious deformities present  · Head and Face  o Head  o :   § Inspection  § : normocephalic  o Face  o :   § Inspection  § : no facial lesions  · Eyes  o Conjunctivae  o : conjunctivae normal  o Sclerae  o : sclerae white  · Ears, Nose, Mouth and Throat  o Ears  o :   § External Ears  § : appearance within normal limits  § Hearing  § : intact  o Nose  o :   § External Nose  § : appearance normal  · Neck  o Inspection/Palpation  o : normal appearance  o Range of Motion  o : full range of motion  · Respiratory  o Respiratory Effort  o : breathing unlabored  o Inspection of Chest  o : normal appearance  o Auscultation of Lungs  o : no audible wheezing or rales  · Cardiovascular  o Heart  o : regular rate  · Gastrointestinal  o Abdominal Examination  o : soft and non-tender  · Skin and Subcutaneous Tissue  o General Inspection  o : intact, no rashes  · Psychiatric  o General  o : Alert and oriented x3  o Judgement and Insight  o : judgment and insight intact  o Mood and Affect  o : mood normal, affect appropriate  · Right Shoulder  o Inspection  o : Pretty good ER. Sensation intact. Pulses normal. Affect present.   · In Office Procedures  o View  o : AP/LATERAL  o Site  o : right, shoulder   o Indication  o : Right shoulder pain   o Study  o : " X-rays ordered, taken in the office, and reviewed today.  o Xray  o : Good alignment and fixation of shoulder replacement.  o Comparative Data  o : No comparative data found          Assessment  · Aftercare right reverse shoulder replacement     V54.81/Z47.1  · Right knee pain, unspecified chronicity     719.46/M25.561  · Right shoulder pain, unspecified chronicity     719.41/M25.511      Plan  · Orders  o Scapula (Right) [facility] Preferred View ([id number]) - 719.41/M25.511 - 05/14/2020  · Medications  o Medications have been Reconciled  o Transition of Care or Provider Policy  · Instructions  o Reviewed the patient's Past Medical, Social, and Family history as well as the ROS at today's visit, no changes.  o Call or return if worsening symptoms.  o This note was transcribed by Mitali Verduzco. mc  o She's going to keep doing her exercises. She will follow-up in a couple years for repeat films.             Electronically Signed by: Mitali Verduzco - , Other -Author on May 18, 2020 02:26:13 PM  Electronically Co-signed by: Candice Vo MD -Reviewer on May 21, 2020 10:09:06 AM

## 2021-05-14 VITALS — BODY MASS INDEX: 23 KG/M2 | OXYGEN SATURATION: 99 % | WEIGHT: 125 LBS | HEIGHT: 62 IN | HEART RATE: 97 BPM

## 2021-05-15 VITALS — WEIGHT: 124.31 LBS | HEIGHT: 62 IN | OXYGEN SATURATION: 96 % | BODY MASS INDEX: 22.88 KG/M2 | HEART RATE: 94 BPM

## 2021-05-15 VITALS — OXYGEN SATURATION: 97 % | HEART RATE: 85 BPM | HEIGHT: 62 IN | BODY MASS INDEX: 23 KG/M2 | WEIGHT: 125 LBS

## 2021-05-15 VITALS — BODY MASS INDEX: 23.37 KG/M2 | WEIGHT: 127 LBS | OXYGEN SATURATION: 97 % | HEIGHT: 62 IN | HEART RATE: 73 BPM

## 2021-05-15 VITALS — BODY MASS INDEX: 23.37 KG/M2 | OXYGEN SATURATION: 97 % | WEIGHT: 127 LBS | HEIGHT: 62 IN | HEART RATE: 79 BPM

## 2021-05-15 VITALS — HEIGHT: 62 IN | OXYGEN SATURATION: 98 % | BODY MASS INDEX: 22.63 KG/M2 | HEART RATE: 77 BPM | WEIGHT: 123 LBS

## 2021-05-15 VITALS — OXYGEN SATURATION: 97 % | BODY MASS INDEX: 23 KG/M2 | HEART RATE: 80 BPM | WEIGHT: 125 LBS | HEIGHT: 62 IN

## 2021-05-15 VITALS — OXYGEN SATURATION: 96 % | BODY MASS INDEX: 23.1 KG/M2 | HEIGHT: 62 IN | WEIGHT: 125.5 LBS | HEART RATE: 100 BPM

## 2021-05-16 VITALS — OXYGEN SATURATION: 97 % | WEIGHT: 132.25 LBS | HEIGHT: 62 IN | HEART RATE: 90 BPM | BODY MASS INDEX: 24.34 KG/M2

## 2021-05-16 VITALS — HEART RATE: 79 BPM | BODY MASS INDEX: 24.84 KG/M2 | OXYGEN SATURATION: 96 % | HEIGHT: 62 IN | WEIGHT: 135 LBS

## 2021-05-16 VITALS — BODY MASS INDEX: 23 KG/M2 | HEIGHT: 62 IN | HEART RATE: 73 BPM | WEIGHT: 125 LBS | OXYGEN SATURATION: 96 %

## 2021-05-16 VITALS — WEIGHT: 121.12 LBS | HEART RATE: 92 BPM | OXYGEN SATURATION: 97 % | BODY MASS INDEX: 22.29 KG/M2 | HEIGHT: 62 IN

## 2021-05-16 VITALS — WEIGHT: 123.5 LBS | HEART RATE: 84 BPM | HEIGHT: 62 IN | OXYGEN SATURATION: 97 % | BODY MASS INDEX: 22.73 KG/M2

## 2021-05-16 VITALS — BODY MASS INDEX: 24.84 KG/M2 | HEIGHT: 62 IN | OXYGEN SATURATION: 97 % | WEIGHT: 135 LBS | HEART RATE: 88 BPM

## 2021-05-16 VITALS — BODY MASS INDEX: 22.82 KG/M2 | WEIGHT: 124 LBS | HEIGHT: 62 IN | HEART RATE: 67 BPM | OXYGEN SATURATION: 96 %

## 2021-05-22 ENCOUNTER — TRANSCRIBE ORDERS (OUTPATIENT)
Dept: ADMINISTRATIVE | Facility: HOSPITAL | Age: 81
End: 2021-05-22

## 2021-05-22 DIAGNOSIS — Z12.31 VISIT FOR SCREENING MAMMOGRAM: Primary | ICD-10-CM

## 2021-06-29 ENCOUNTER — HOSPITAL ENCOUNTER (OUTPATIENT)
Dept: MAMMOGRAPHY | Facility: HOSPITAL | Age: 81
Discharge: HOME | End: 2021-06-29
Admitting: INTERNAL MEDICINE

## 2021-06-29 DIAGNOSIS — Z12.31 VISIT FOR SCREENING MAMMOGRAM: ICD-10-CM

## 2021-06-29 PROCEDURE — 77063 BREAST TOMOSYNTHESIS BI: CPT

## 2021-06-29 PROCEDURE — 77067 SCR MAMMO BI INCL CAD: CPT

## 2021-07-08 ENCOUNTER — OFFICE VISIT (OUTPATIENT)
Dept: ORTHOPEDIC SURGERY | Facility: CLINIC | Age: 81
End: 2021-07-08

## 2021-07-08 VITALS — WEIGHT: 129 LBS | HEIGHT: 62 IN | OXYGEN SATURATION: 97 % | BODY MASS INDEX: 23.74 KG/M2 | HEART RATE: 90 BPM

## 2021-07-08 DIAGNOSIS — M25.562 LEFT KNEE PAIN, UNSPECIFIED CHRONICITY: ICD-10-CM

## 2021-07-08 DIAGNOSIS — M25.561 RIGHT KNEE PAIN, UNSPECIFIED CHRONICITY: ICD-10-CM

## 2021-07-08 DIAGNOSIS — M17.0 PRIMARY OSTEOARTHRITIS OF KNEES, BILATERAL: Primary | ICD-10-CM

## 2021-07-08 PROCEDURE — 20610 DRAIN/INJ JOINT/BURSA W/O US: CPT | Performed by: ORTHOPAEDIC SURGERY

## 2021-07-08 PROCEDURE — 99213 OFFICE O/P EST LOW 20 MIN: CPT | Performed by: ORTHOPAEDIC SURGERY

## 2021-07-08 RX ORDER — HYDROCODONE BITARTRATE AND ACETAMINOPHEN 5; 325 MG/1; MG/1
TABLET ORAL
COMMUNITY
Start: 2021-06-14 | End: 2021-10-21

## 2021-07-08 RX ORDER — AMLODIPINE BESYLATE 5 MG/1
5 TABLET ORAL DAILY
COMMUNITY
End: 2022-02-25

## 2021-07-08 RX ORDER — GABAPENTIN 300 MG/1
CAPSULE ORAL
COMMUNITY
Start: 2021-06-14 | End: 2021-10-21

## 2021-07-08 RX ORDER — PANTOPRAZOLE SODIUM 40 MG/1
TABLET, DELAYED RELEASE ORAL
COMMUNITY
Start: 2021-06-14 | End: 2021-10-21

## 2021-07-08 RX ADMIN — LIDOCAINE HYDROCHLORIDE 9 ML: 10 INJECTION, SOLUTION INFILTRATION; PERINEURAL at 15:56

## 2021-07-08 RX ADMIN — LIDOCAINE HYDROCHLORIDE 9 ML: 10 INJECTION, SOLUTION INFILTRATION; PERINEURAL at 15:55

## 2021-07-08 RX ADMIN — METHYLPREDNISOLONE ACETATE 80 MG: 80 INJECTION, SUSPENSION INTRA-ARTICULAR; INTRALESIONAL; INTRAMUSCULAR; SOFT TISSUE at 15:56

## 2021-07-08 RX ADMIN — METHYLPREDNISOLONE ACETATE 80 MG: 80 INJECTION, SUSPENSION INTRA-ARTICULAR; INTRALESIONAL; INTRAMUSCULAR; SOFT TISSUE at 15:55

## 2021-07-08 NOTE — PROGRESS NOTES
"Chief Complaint  Initial Evaluation of the Right Knee and Follow-up of the Left Knee     Subjective      Breanna York presents to [facility] for an evaluation of bilateral knees. Patient was treated for left hamstring tendinitis and left knee osteoarthritis in the past, as well as right knee osteoarthritis. Patient has a long history of on and off bilateral knee pain since Oct. 2020. She had severe pain in June 2021 but she couldn’t get in to see us. She went and saw her PCP and was started on Diclofenac and hydrocodone. She doesn’t want to be on this long term. She states her left knee is worse than her right at this time. Left knee pain is all over and radiates down to her proximal calf and distal hamstring. Her right knee pain is localized on the medial joint line. She has received injections in the past (for the right knee) that has given her relief.     Allergies   Allergen Reactions   • Meperidine Unknown - High Severity   • Latex Rash        Social History     Socioeconomic History   • Marital status:      Spouse name: Not on file   • Number of children: Not on file   • Years of education: Not on file   • Highest education level: Not on file   Tobacco Use   • Smoking status: Never Smoker   • Smokeless tobacco: Never Used   Vaping Use   • Vaping Use: Never used   Substance and Sexual Activity   • Alcohol use: Never   • Drug use: Never        Review of Systems     Objective   Vital Signs:   Pulse 90   Ht 157.5 cm (62\")   Wt 58.5 kg (129 lb)   SpO2 97%   BMI 23.59 kg/m²       Physical Exam  Constitutional:       Appearance: Normal appearance. He is well-developed and normal weight.   HENT:      Head: Normocephalic.      Right Ear: Hearing and external ear normal.      Left Ear: Hearing and external ear normal.      Nose: Nose normal.   Eyes:      Conjunctiva/sclera: Conjunctivae normal.   Cardiovascular:      Rate and Rhythm: Normal rate.   Pulmonary:      Effort: " Pulmonary effort is normal.      Breath sounds: No wheezing or rales.   Abdominal:      Palpations: Abdomen is soft.      Tenderness: There is no abdominal tenderness.   Musculoskeletal:      Cervical back: Normal range of motion.   Skin:     Findings: No rash.   Neurological:      Mental Status: He is alert and oriented to person, place, and time.   Psychiatric:         Mood and Affect: Mood and affect normal.         Judgment: Judgment normal.       Ortho Exam      RIGHT KNEE: Tender medial joint line. Non-tender lateral joint line. Calf supple, non-tender. Skin intact. Dorsal Pedal Pulse 2+, posteriror tibialis pulse 2+. Sensation grossly intact. Neurovascular intact. Good strength to hamstrings, quadriceps, dorsiflexors and plantar flexors. Full extension and flexion. Weight bearing. Skin intact. No swelling, skin discoloration or atrophy.     LEFT KNEE: Tender medial and lateral joint line. Calf supple, non-tender. Dorsal Pedal Pulse 2+, posteriror tibialis pulse 2+. Sensation grossly intact. Neurovascular intact.  Good strength to hamstrings, quadriceps, dorsiflexors and plantar flexors. No swelling, skin discoloration or atrophy. Skin intact. Weight bearing. Slight antalgic gait.       Large Joint Arthrocentesis: R knee  Date/Time: 7/8/2021 3:55 PM  Consent given by: patient  Site marked: site marked  Timeout: Immediately prior to procedure a time out was called to verify the correct patient, procedure, equipment, support staff and site/side marked as required   Supporting Documentation  Indications: pain   Procedure Details  Location: knee - R knee  Needle gauge: 21G.  Medications administered: 9 mL lidocaine 1 %; 80 mg methylPREDNISolone acetate 80 MG/ML  Patient tolerance: patient tolerated the procedure well with no immediate complications    Large Joint Arthrocentesis: L knee  Date/Time: 7/8/2021 3:56 PM  Consent given by: patient  Site marked: site marked  Timeout: Immediately prior to procedure a time  out was called to verify the correct patient, procedure, equipment, support staff and site/side marked as required   Supporting Documentation  Indications: pain   Procedure Details  Location: knee - L knee  Preparation: Patient was prepped and draped in the usual sterile fashion  Needle gauge: 21G.  Medications administered: 9 mL lidocaine 1 %; 80 mg methylPREDNISolone acetate 80 MG/ML  Patient tolerance: patient tolerated the procedure well with no immediate complications            Imaging Results (Most Recent)     Procedure Component Value Units Date/Time    XR Knee 3 View Left [[id number]] Resulted: 07/08/21 1650     Updated: 07/08/21 1651    Narrative:      X-Ray Report:  Bilateral knee(s) X-Ray  Indication: Evaluation of bilateral knees   AP, Lateral and Standing view(s)  Findings: Demonstrates degenerative changes of bilateral knees with no   fracture or dislocation.   Prior studies available for comparison: yes     XR Knee 3 View Right [[id number]] Resulted: 07/08/21 1650     Updated: 07/08/21 1650    Narrative:      X-Ray Report:  Bilateral knee(s) X-Ray  Indication: Evaluation of bilateral knees   AP, Lateral and Standing view(s)  Findings: Demonstrates degenerative changes of bilateral knees with no   fracture or dislocation.   Prior studies available for comparison: yes            Result Review :         X-Ray Report:  Bilateral knee(s) X-Ray  Indication: Evaluation of bilateral knees   AP, Lateral and Standing view(s)  Findings: Demonstrates degenerative changes of bilateral knees with no fracture or dislocation.   Prior studies available for comparison: yes     Assessment and Plan     DX: Bilateral knee osteoarthritis     Discussed treatment plans and diagnosis with the patient. Patient was given bilateral knee steroid injections and tolerated this procedure well.     Call or return if worsening symptoms.    Follow Up     PRN.       Patient was given instructions and counseling regarding her condition or  for health maintenance advice. Please see specific information pulled into the AVS if appropriate.     Scribed for Candice Vo MD by Marija Ordaz.  07/08/21   15:04 EDT    I have personally performed the services described in this document as scribed by the above individual and it is both accurate and complete.  Candice Vo MD 07/08/21  15:04 EDT

## 2021-07-09 RX ORDER — LIDOCAINE HYDROCHLORIDE 10 MG/ML
9 INJECTION, SOLUTION INFILTRATION; PERINEURAL
Status: COMPLETED | OUTPATIENT
Start: 2021-07-08 | End: 2021-07-08

## 2021-07-09 RX ORDER — METHYLPREDNISOLONE ACETATE 80 MG/ML
80 INJECTION, SUSPENSION INTRA-ARTICULAR; INTRALESIONAL; INTRAMUSCULAR; SOFT TISSUE
Status: COMPLETED | OUTPATIENT
Start: 2021-07-08 | End: 2021-07-08

## 2021-07-26 RX ORDER — GABAPENTIN 100 MG/1
CAPSULE ORAL
Qty: 180 CAPSULE | Refills: 0 | Status: SHIPPED | OUTPATIENT
Start: 2021-07-26 | End: 2021-10-21 | Stop reason: SDUPTHER

## 2021-08-19 ENCOUNTER — OFFICE VISIT (OUTPATIENT)
Dept: ORTHOPEDIC SURGERY | Facility: CLINIC | Age: 81
End: 2021-08-19

## 2021-08-19 VITALS — BODY MASS INDEX: 23.74 KG/M2 | WEIGHT: 129 LBS | HEIGHT: 62 IN | OXYGEN SATURATION: 99 % | HEART RATE: 73 BPM

## 2021-08-19 DIAGNOSIS — M17.0 BILATERAL PRIMARY OSTEOARTHRITIS OF KNEE: Primary | ICD-10-CM

## 2021-08-19 PROCEDURE — 99213 OFFICE O/P EST LOW 20 MIN: CPT | Performed by: ORTHOPAEDIC SURGERY

## 2021-08-19 NOTE — PROGRESS NOTES
"Chief Complaint  Follow-up of the Right Knee and Follow-up of the Left Knee     Subjective      Breanna York presents to [facility] for a follow-up of bilateral knees. Patient has been having on and off bilateral knee pain since 10/2020. Patient has a history of bilateral knee osteoarthritis and left hamstring tendinitis that we have been treating conservatively. On 7/8/21 patient received bilateral knee injections that have provided her with relief of pain. She is here to follow-up and see how her knees are doing. She states the pain is better. She states her pain is tolerable at this time. She takes Diclofenac as needed.     Allergies   Allergen Reactions   • Meperidine Unknown - High Severity   • Latex Rash        Social History     Socioeconomic History   • Marital status:      Spouse name: Not on file   • Number of children: Not on file   • Years of education: Not on file   • Highest education level: Not on file   Tobacco Use   • Smoking status: Never Smoker   • Smokeless tobacco: Never Used   Vaping Use   • Vaping Use: Never used   Substance and Sexual Activity   • Alcohol use: Never   • Drug use: Never        Review of Systems     Objective   Vital Signs:   Pulse 73   Ht 157.5 cm (62\")   Wt 58.5 kg (129 lb)   SpO2 99%   BMI 23.59 kg/m²       Physical Exam  Constitutional:       Appearance: Normal appearance. He is well-developed and normal weight.   HENT:      Head: Normocephalic.      Right Ear: Hearing and external ear normal.      Left Ear: Hearing and external ear normal.      Nose: Nose normal.   Eyes:      Conjunctiva/sclera: Conjunctivae normal.   Cardiovascular:      Rate and Rhythm: Normal rate.   Pulmonary:      Effort: Pulmonary effort is normal.      Breath sounds: No wheezing or rales.   Abdominal:      Palpations: Abdomen is soft.      Tenderness: There is no abdominal tenderness.   Musculoskeletal:      Cervical back: Normal range of motion.   Skin:     " Findings: No rash.   Neurological:      Mental Status: He is alert and oriented to person, place, and time.   Psychiatric:         Mood and Affect: Mood and affect normal.         Judgment: Judgment normal.       Ortho Exam      LEFT KNEE: Normal in appearance. Full weight bearing. Dorsal Pedal Pulse 2+, posterior tibialis pulse 2+. Calf supple, non-tender. Skin intact. Tender medial and lateral joint line. No swelling, skin discoloration or atrophy. Full extension. Flexion to 120 degrees. Good strength to hamstrings, quadriceps, dorsiflexors and plantar flexors. Sensation grossly intact. Neurovascular intact. Skin intact.     RIGHT KNEE: Dorsal Pedal Pulse 2+, posterior tibialis pulse 2+. Calf supple, non-tender. Skin intact. Good strength to hamstrings, quadriceps, dorsiflexors and plantar flexors. Full weight bearing. Non-antalgic gait. Sensation grossly intact. Neurovascular intact. No swelling, skin discoloration or atrophy. Full extension. Flexion to 125 degrees.  Tender medial and lateral joint line.      Procedures      Imaging Results (Most Recent)     None           Result Review :       No results found.        Assessment and Plan     DX: Bilateral knee osteoarthritis     Discussed treatment plans with the patient. Patient will continue activities as tolerated.     Call or return if worsening symptoms.    Follow Up     PRN.       Patient was given instructions and counseling regarding her condition or for health maintenance advice. Please see specific information pulled into the AVS if appropriate.     Scribed for Candice Vo MD by Marija Ordaz.  08/19/21   09:24 EDT

## 2021-08-20 ENCOUNTER — TRANSCRIBE ORDERS (OUTPATIENT)
Dept: ADMINISTRATIVE | Facility: HOSPITAL | Age: 81
End: 2021-08-20

## 2021-08-20 DIAGNOSIS — R22.1 NECK SWELLING: Primary | ICD-10-CM

## 2021-08-26 ENCOUNTER — APPOINTMENT (OUTPATIENT)
Dept: CT IMAGING | Facility: HOSPITAL | Age: 81
End: 2021-08-26

## 2021-10-18 ENCOUNTER — LAB (OUTPATIENT)
Dept: LAB | Facility: HOSPITAL | Age: 81
End: 2021-10-18

## 2021-10-18 ENCOUNTER — TRANSCRIBE ORDERS (OUTPATIENT)
Dept: LAB | Facility: HOSPITAL | Age: 81
End: 2021-10-18

## 2021-10-18 DIAGNOSIS — E55.9 VITAMIN D DEFICIENCY: ICD-10-CM

## 2021-10-18 DIAGNOSIS — E78.2 MIXED HYPERLIPIDEMIA: Primary | ICD-10-CM

## 2021-10-18 DIAGNOSIS — I10 ESSENTIAL HYPERTENSION, MALIGNANT: ICD-10-CM

## 2021-10-18 DIAGNOSIS — E78.2 MIXED HYPERLIPIDEMIA: ICD-10-CM

## 2021-10-18 LAB
25(OH)D3 SERPL-MCNC: 53.8 NG/ML
ALBUMIN SERPL-MCNC: 4.6 G/DL (ref 3.5–5.2)
ALBUMIN/GLOB SERPL: 1.8 G/DL
ALP SERPL-CCNC: 71 U/L (ref 39–117)
ALT SERPL W P-5'-P-CCNC: 13 U/L (ref 1–33)
ANION GAP SERPL CALCULATED.3IONS-SCNC: 9.1 MMOL/L (ref 5–15)
AST SERPL-CCNC: 25 U/L (ref 1–32)
BILIRUB SERPL-MCNC: 0.3 MG/DL (ref 0–1.2)
BUN SERPL-MCNC: 14 MG/DL (ref 8–23)
BUN/CREAT SERPL: 15.9 (ref 7–25)
CALCIUM SPEC-SCNC: 8.9 MG/DL (ref 8.6–10.5)
CHLORIDE SERPL-SCNC: 106 MMOL/L (ref 98–107)
CHOLEST SERPL-MCNC: 256 MG/DL (ref 0–200)
CO2 SERPL-SCNC: 24.9 MMOL/L (ref 22–29)
CREAT SERPL-MCNC: 0.88 MG/DL (ref 0.57–1)
GFR SERPL CREATININE-BSD FRML MDRD: 62 ML/MIN/1.73
GLOBULIN UR ELPH-MCNC: 2.6 GM/DL
GLUCOSE SERPL-MCNC: 93 MG/DL (ref 65–99)
HDLC SERPL-MCNC: 77 MG/DL (ref 40–60)
LDLC SERPL CALC-MCNC: 163 MG/DL (ref 0–100)
LDLC/HDLC SERPL: 2.09 {RATIO}
POTASSIUM SERPL-SCNC: 3.7 MMOL/L (ref 3.5–5.2)
PROT SERPL-MCNC: 7.2 G/DL (ref 6–8.5)
SODIUM SERPL-SCNC: 140 MMOL/L (ref 136–145)
TRIGL SERPL-MCNC: 92 MG/DL (ref 0–150)
VLDLC SERPL-MCNC: 16 MG/DL (ref 5–40)

## 2021-10-18 PROCEDURE — 80061 LIPID PANEL: CPT

## 2021-10-18 PROCEDURE — 80053 COMPREHEN METABOLIC PANEL: CPT

## 2021-10-18 PROCEDURE — 36415 COLL VENOUS BLD VENIPUNCTURE: CPT

## 2021-10-18 PROCEDURE — 82306 VITAMIN D 25 HYDROXY: CPT

## 2021-10-19 PROBLEM — Z12.31 ENCOUNTER FOR SCREENING MAMMOGRAM FOR MALIGNANT NEOPLASM OF BREAST: Status: ACTIVE | Noted: 2021-10-19

## 2021-10-19 PROBLEM — D50.9 IRON DEFICIENCY ANEMIA: Status: ACTIVE | Noted: 2021-10-19

## 2021-10-19 PROBLEM — M19.91 PRIMARY LOCALIZED OSTEOARTHRITIS: Status: ACTIVE | Noted: 2018-05-31

## 2021-10-19 PROBLEM — M16.0 PRIMARY OSTEOARTHRITIS OF BOTH HIPS: Status: ACTIVE | Noted: 2018-09-13

## 2021-10-19 PROBLEM — M75.41 IMPINGEMENT SYNDROME OF RIGHT SHOULDER: Status: ACTIVE | Noted: 2018-09-13

## 2021-10-19 PROBLEM — E78.2 MIXED HYPERLIPIDEMIA: Status: ACTIVE | Noted: 2021-10-19

## 2021-10-19 NOTE — PROGRESS NOTES
"Chief Complaint  Labs Only and Med Refill (Pt needs script for gabapentin)    Subjective          Breanna York presents to [facility]     History of Present Illness        Review of Systems   Constitutional: Negative for appetite change, fatigue and fever.   HENT: Negative for congestion and ear pain.    Eyes: Negative for blurred vision.   Respiratory: Negative for cough, chest tightness, shortness of breath and wheezing.    Cardiovascular: Negative for chest pain, palpitations and leg swelling.   Gastrointestinal: Negative for abdominal pain.   Genitourinary: Negative for difficulty urinating, dysuria and hematuria.   Musculoskeletal: Negative for arthralgias and gait problem.   Skin: Negative for skin lesions.   Neurological: Negative for syncope, memory problem and confusion.   Psychiatric/Behavioral: Negative for self-injury and depressed mood.       Objective   Vital Signs:   /80 (BP Location: Left arm, Patient Position: Sitting, Cuff Size: Adult)   Pulse 72   Temp 97.2 °F (36.2 °C) (Temporal)   Resp 20   Ht 157.5 cm (62\")   Wt 60.1 kg (132 lb 9.6 oz)   SpO2 98%   BMI 24.25 kg/m²           Physical Exam  Vitals and nursing note reviewed.   Constitutional:       General: She is not in acute distress.     Appearance: Normal appearance. She is not toxic-appearing.   HENT:      Head: Atraumatic.      Right Ear: External ear normal.      Left Ear: External ear normal.      Nose: Nose normal.      Mouth/Throat:      Mouth: Mucous membranes are moist.   Eyes:      General:         Right eye: No discharge.         Left eye: No discharge.      Extraocular Movements: Extraocular movements intact.      Pupils: Pupils are equal, round, and reactive to light.   Cardiovascular:      Rate and Rhythm: Normal rate and regular rhythm.      Pulses: Normal pulses.      Heart sounds: Normal heart sounds. No murmur heard.  No gallop.    Pulmonary:      Effort: Pulmonary effort is " normal. No respiratory distress.      Breath sounds: No wheezing, rhonchi or rales.   Abdominal:      General: There is no distension.      Palpations: Abdomen is soft. There is no mass.      Tenderness: There is no abdominal tenderness. There is no guarding.   Musculoskeletal:         General: No swelling or tenderness.      Cervical back: No tenderness.      Right lower leg: No edema.      Left lower leg: No edema.   Skin:     General: Skin is warm and dry.      Findings: No rash.   Neurological:      General: No focal deficit present.      Mental Status: She is alert and oriented to person, place, and time. Mental status is at baseline.      Motor: No weakness.      Gait: Gait normal.   Psychiatric:         Mood and Affect: Mood normal.         Thought Content: Thought content normal.          Result Review :   The following data was reviewed by: Topher Perera MD on 10/21/2021:                  Assessment and Plan    Diagnoses and all orders for this visit:    1. Essential hypertension (Primary)  Overview:  Blood pressure with reasonable control as of her 10/21 office visit.  Patient is stable on single agent, moderate dose amlodipine.  Continue same.    Orders:  -     Comprehensive Metabolic Panel; Future    2. Mixed hyperlipidemia  Overview:  LDL has trended up gradually from 120, to 147, and now it sits at 163.  I discussed with patient that her 10-year risk is 33%, so recommendations would be to lower her LDL by at least 50%.  She wants to continue with conservative dietary restrictions only though.  We will repeat level next year.    Orders:  -     Lipid Panel; Future    3. Vitamin D deficiency  Overview:  Vitamin D is 54 as of her 10/21 office visit.  She is stable on low-dose over-the-counter supplementation, continue same.    Orders:  -     Vitamin D 1,25 Dihydroxy; Future    4. Primary localized osteoarthritis  Overview:  Patient with recent injections of bilateral knees by Dr. Vo as of her 10/21  office visit.  She has Voltaren she uses based on her activity level, denies any abdominal pain secondary to it.  Patient stable to continue current treatment regimen.    Orders:  -     gabapentin (NEURONTIN) 100 MG capsule; Take 2 capsules by mouth every night at bedtime.  Dispense: 180 capsule; Refill: 1    5. Age-related osteoporosis without current pathological fracture  Overview:  BMD 1/18...spine -1.0, hip -2.4...intol to actonel so on Reclast #5/5 due 1/19---> BMD 10/20 = spine -1.2, hip -2.3 = stable.    We will repeat BMD in 2022, patient to continue with conservative treatment for now.        6. Bruit of right carotid artery  Overview:  This is new as of her 10/21 office visit.  We will schedule ultrasound, patient to call for results.    Orders:  -     Duplex Carotid Ultrasound CAR; Future    7. Need for influenza vaccination    Other orders  -     Fluzone High-Dose 65+yrs (0558-4203)     --  --  OLDER NOTES:  URGENT VISIT 10/20:  L NECK PAIN just like 12/16 note below; I d/w C-spine series and take aleve 1 tab bid---> signif DJD as expected; did not have to stay on aleve; no radiation;   TACHYCARDIA = HR up 70 to 90+; has CBC/TSH [facility], so will review them when done and eval further on RTO; no CP...TSH neg, CBC with Hgb 11.2; since resolved; was related to Covid infection she said.  COVID-19 + on Labor Day.  --  VISIT 4/21 = above/below.  ANNUAL MEDICARE WELLNESS 4/21 = d/w all forms in office; no new discoveries; Narciso neg.  --  MILD ANEMIA as of 10/20 OV=11.2, down 1 gram past year; no bleeding; f/u on RTO---> Hgb 12 with nl iron as of 4/21, so defer f/u.  --  HTN remains controlled off ACEI as of 7/18 OV; bring machine in=150's at home (stopped ACEI due to severe angiodedema following bug bites); will use norvasc 2.5 if BP trends back up...start now 1/19...better 4/19---> holding 10/19 on these=ditto 4/21.  ?CKD3 = 53% in 4/20 = watch on RTO---> fine 10/20 and same 4/21.  --  LIPIDS at goal '12 w/o  tx...ditto 8/15 with HDL 86..., but no tx unless event...141...121 with HDL 95, so defer tx 10/19---> , so I d/w get it back in the 120's please.  --  C/O R HIP PAIN...exam with excellent ROM, neg SLR, point tenderness c/w bursitis...try meds for now and call...better and then returned, no trauma, intermittent, worse up stairs, but able to mow/trim 5 acres, PTA for eval/HEP...worse after PT, s/p injection per ortho in [place] and prn mobic... HIP now, saw ortho in [place], story sounds radicular, but exam more c/w bursitis, so try medrol and keep using heat; also told me ortho was going to [facility] MRI and I rec gabapentin again...MRI hip=bursitis and spine with DJD; things are better and only tolerating low dose gabapentin...stable off=ran out and no worse.  R KNEE PAIN=prior scopes; sx's flaring 1/18, but not severe yet, so no new tx rec...get steroid shots per Dr Vo prn---> L knee issues as of 4/21 and is in PT.  S/P R r TSR on 4/17/19 per Dr Vo...has completed rehab as of 10/19 OV; no pain meds needed.  --  SHINGLES rash is drying up, no secondary infection, but is very sensitive; c/w meds and will push dose...she's weaning dose, but still with sxs 6/17.  L NECK PAIN needing to take ASA daily now; ? CINDY vs carotid calcification, no bruit; needs CT and ? dopplers/etc; please call...it was neg.  BALANCE ISSUES past few months, just has to occ grab wall=needs HCT as well...it was neg  --  HYPERCALCEMIA S/P RESECTION 9/15... PTH 40...PTH 73 and is trending up, but Ca++ 8.8 is stable 1/18...PTH 48...40 in 10/19 and will stop checking.    OSTEOPOROSIS...BMD 1/18...spine -1.0, hip -2.4...intol to actonel so on Reclast #5/5 due 1/19---> BMD 10/20 = spine -1.2, hip -2.3 = stable.  VIT D DEF stable (on 2K qd)...55 in 1/19---> 58 in 10/20.  --  GERD/ABD PAIN...to ER 11/13 with CT=mild prominence of CBD and pancreatic duct, labs neg...no melena, no blood...sx's better with 2x PPI...rare sxs 1/18 on no  meds, so call if recurrent and would use H2 first line...dysphagia as of 1/19 OV, to water at times, so needs MBSS to start...non-issue.  --  DEPRESSION/INSOMNIA with 's passing...try trazodone first, SSRI if no benefit or intol...with prn ambien...better with PRN restoril.   --  PETICHIAL LESIONS 8/19 on legs=she showed me pictures at 10/19 OV; said it was non-blanchable, no itch, + stinging though; lasted 3-4 days, started in one spot on LLE and spread; I d/w call when recurs and will get labs.  FACIAL SWELLING 4/18 =nasal and bilateral inner eyelids/etc; top of scalp with scabbed area, but no tick/etc; associated erythema; lungs clear, no dysphagia; will treat with abx as well as prednisone and take benadryl as well... recurred 2 weeks later, tried ice/benaryl, to ER, given keflex/bactrim/steroid shot; no dysphagia nor breathing issues with either episode; did have mosquito bite; pics reviewed; might as well finish out abx; rec no ACEI and zyrtec 10 mg bid for now; wear bug sprays if going out.  --  --  MMG 11/22/19 per me and is pending as of 4/21 OV.  EGD 7/12...erosive gastritis.  COLON 7/12...normal...defer.   REFUSES flu/pneumo; Shingrix x2; (Pt had Covid-19 in 9/20); rec COVID vac 4/21.  ( '12...after 57 yrs, 4 boys with 1 here and 2 in [place], 1 in ---> had 47 for holidays q year for both holidays=has 22 great-grand already).    Follow Up   Return in about 6 months (around 4/21/2022).  Patient was given instructions and counseling regarding her condition or for health maintenance advice. Please see specific information pulled into the AVS if appropriate.

## 2021-10-21 ENCOUNTER — OFFICE VISIT (OUTPATIENT)
Dept: INTERNAL MEDICINE | Facility: CLINIC | Age: 81
End: 2021-10-21

## 2021-10-21 VITALS
RESPIRATION RATE: 20 BRPM | HEIGHT: 62 IN | HEART RATE: 72 BPM | WEIGHT: 132.6 LBS | SYSTOLIC BLOOD PRESSURE: 150 MMHG | DIASTOLIC BLOOD PRESSURE: 80 MMHG | OXYGEN SATURATION: 98 % | TEMPERATURE: 97.2 F | BODY MASS INDEX: 24.4 KG/M2

## 2021-10-21 DIAGNOSIS — I10 ESSENTIAL HYPERTENSION: Primary | ICD-10-CM

## 2021-10-21 DIAGNOSIS — M81.0 AGE-RELATED OSTEOPOROSIS WITHOUT CURRENT PATHOLOGICAL FRACTURE: ICD-10-CM

## 2021-10-21 DIAGNOSIS — E78.2 MIXED HYPERLIPIDEMIA: ICD-10-CM

## 2021-10-21 DIAGNOSIS — E55.9 VITAMIN D DEFICIENCY: ICD-10-CM

## 2021-10-21 DIAGNOSIS — M19.91 PRIMARY LOCALIZED OSTEOARTHRITIS: ICD-10-CM

## 2021-10-21 DIAGNOSIS — Z23 NEED FOR INFLUENZA VACCINATION: ICD-10-CM

## 2021-10-21 DIAGNOSIS — R09.89 BRUIT OF RIGHT CAROTID ARTERY: ICD-10-CM

## 2021-10-21 PROCEDURE — 99214 OFFICE O/P EST MOD 30 MIN: CPT | Performed by: INTERNAL MEDICINE

## 2021-10-21 PROCEDURE — 90662 IIV NO PRSV INCREASED AG IM: CPT | Performed by: INTERNAL MEDICINE

## 2021-10-21 PROCEDURE — G0008 ADMIN INFLUENZA VIRUS VAC: HCPCS | Performed by: INTERNAL MEDICINE

## 2021-10-21 RX ORDER — MULTIPLE VITAMINS W/ MINERALS TAB 9MG-400MCG
1 TAB ORAL DAILY
COMMUNITY

## 2021-10-21 RX ORDER — GABAPENTIN 100 MG/1
200 CAPSULE ORAL
Qty: 180 CAPSULE | Refills: 0 | Status: CANCELLED | OUTPATIENT
Start: 2021-10-21

## 2021-10-21 RX ORDER — GABAPENTIN 100 MG/1
200 CAPSULE ORAL
Qty: 180 CAPSULE | Refills: 1 | Status: SHIPPED | OUTPATIENT
Start: 2021-10-21 | End: 2022-04-21

## 2021-11-01 ENCOUNTER — HOSPITAL ENCOUNTER (OUTPATIENT)
Dept: CARDIOLOGY | Facility: HOSPITAL | Age: 81
Discharge: HOME | End: 2021-11-01
Admitting: INTERNAL MEDICINE

## 2021-11-01 DIAGNOSIS — R09.89 BRUIT OF RIGHT CAROTID ARTERY: ICD-10-CM

## 2021-11-01 LAB
BH CV XLRA MEAS LEFT CAROTID BULB EDV: 10.8 CM/SEC
BH CV XLRA MEAS LEFT CAROTID BULB PSV: 33.9 CM/SEC
BH CV XLRA MEAS LEFT DIST CCA EDV: 25.9 CM/SEC
BH CV XLRA MEAS LEFT DIST CCA PSV: 70.1 CM/SEC
BH CV XLRA MEAS LEFT DIST ICA EDV: 28.5 CM/SEC
BH CV XLRA MEAS LEFT DIST ICA PSV: 65.4 CM/SEC
BH CV XLRA MEAS LEFT MID ICA EDV: 21.6 CM/SEC
BH CV XLRA MEAS LEFT MID ICA PSV: 53.6 CM/SEC
BH CV XLRA MEAS LEFT PROX CCA EDV: 23.1 CM/SEC
BH CV XLRA MEAS LEFT PROX CCA PSV: 86.2 CM/SEC
BH CV XLRA MEAS LEFT PROX ECA EDV: 16.2 CM/SEC
BH CV XLRA MEAS LEFT PROX ECA PSV: 78.6 CM/SEC
BH CV XLRA MEAS LEFT PROX ICA EDV: 14.3 CM/SEC
BH CV XLRA MEAS LEFT PROX ICA PSV: 51.6 CM/SEC
BH CV XLRA MEAS LEFT VERTEBRAL A EDV: 10.3 CM/SEC
BH CV XLRA MEAS LEFT VERTEBRAL A PSV: 33.9 CM/SEC
BH CV XLRA MEAS RIGHT CAROTID BULB EDV: 19.3 CM/SEC
BH CV XLRA MEAS RIGHT CAROTID BULB PSV: 55.9 CM/SEC
BH CV XLRA MEAS RIGHT DIST CCA EDV: 22.4 CM/SEC
BH CV XLRA MEAS RIGHT DIST CCA PSV: 73.3 CM/SEC
BH CV XLRA MEAS RIGHT DIST ICA EDV: 31.6 CM/SEC
BH CV XLRA MEAS RIGHT DIST ICA PSV: 73.4 CM/SEC
BH CV XLRA MEAS RIGHT MID ICA EDV: 34.8 CM/SEC
BH CV XLRA MEAS RIGHT MID ICA PSV: 75.8 CM/SEC
BH CV XLRA MEAS RIGHT PROX CCA EDV: 18.6 CM/SEC
BH CV XLRA MEAS RIGHT PROX CCA PSV: 70.2 CM/SEC
BH CV XLRA MEAS RIGHT PROX ECA EDV: 18.2 CM/SEC
BH CV XLRA MEAS RIGHT PROX ECA PSV: 91.8 CM/SEC
BH CV XLRA MEAS RIGHT PROX ICA EDV: 21.1 CM/SEC
BH CV XLRA MEAS RIGHT PROX ICA PSV: 62.1 CM/SEC
BH CV XLRA MEAS RIGHT VERTEBRAL A EDV: 14 CM/SEC
BH CV XLRA MEAS RIGHT VERTEBRAL A PSV: 56 CM/SEC
LEFT ARM BP: NORMAL MMHG
MAXIMAL PREDICTED HEART RATE: 139 BPM
RIGHT ARM BP: NORMAL MMHG
STRESS TARGET HR: 118 BPM

## 2021-11-01 PROCEDURE — 93880 EXTRACRANIAL BILAT STUDY: CPT | Performed by: SURGERY

## 2021-11-01 PROCEDURE — 93880 EXTRACRANIAL BILAT STUDY: CPT

## 2022-02-25 RX ORDER — AMLODIPINE BESYLATE 5 MG/1
TABLET ORAL
Qty: 90 TABLET | Refills: 1 | Status: SHIPPED | OUTPATIENT
Start: 2022-02-25 | End: 2022-12-16 | Stop reason: SDUPTHER

## 2022-03-15 ENCOUNTER — OFFICE VISIT (OUTPATIENT)
Dept: ORTHOPEDIC SURGERY | Facility: CLINIC | Age: 82
End: 2022-03-15

## 2022-03-15 VITALS — HEART RATE: 76 BPM | WEIGHT: 130 LBS | HEIGHT: 62 IN | OXYGEN SATURATION: 97 % | BODY MASS INDEX: 23.92 KG/M2

## 2022-03-15 DIAGNOSIS — S66.801A INJURY OF FLEXOR TENDON OF RIGHT HAND, INITIAL ENCOUNTER: ICD-10-CM

## 2022-03-15 DIAGNOSIS — M79.641 RIGHT HAND PAIN: Primary | ICD-10-CM

## 2022-03-15 PROCEDURE — 29280 STRAPPING OF HAND OR FINGER: CPT | Performed by: ORTHOPAEDIC SURGERY

## 2022-03-15 PROCEDURE — 99213 OFFICE O/P EST LOW 20 MIN: CPT | Performed by: ORTHOPAEDIC SURGERY

## 2022-03-15 NOTE — PROGRESS NOTES
"Chief Complaint  Initial Evaluation of the Right Hand     Subjective      Breanna York presents to [facility] for an evaluation of right hand. She has pain about the base of her pinky finger that radiates into the wrist. She has difficulty with full flexion of her pinky finger. She has pain about the knuckles of her 2nd to 5th digits. She hasn’t had any injury to the right hand.     Allergies   Allergen Reactions   • Meperidine Unknown - High Severity   • Latex Rash        Social History     Socioeconomic History   • Marital status:    Tobacco Use   • Smoking status: Never Smoker   • Smokeless tobacco: Never Used   Vaping Use   • Vaping Use: Never used   Substance and Sexual Activity   • Alcohol use: Never   • Drug use: Never        Review of Systems     Objective   Vital Signs:   Pulse 76   Ht 157.5 cm (62\")   Wt 59 kg (130 lb)   SpO2 97%   BMI 23.78 kg/m²       Physical Exam  Constitutional:       Appearance: Normal appearance. Patient is well-developed and normal weight.   HENT:      Head: Normocephalic.      Right Ear: Hearing and external ear normal.      Left Ear: Hearing and external ear normal.      Nose: Nose normal.   Eyes:      Conjunctiva/sclera: Conjunctivae normal.   Cardiovascular:      Rate and Rhythm: Normal rate.   Pulmonary:      Effort: Pulmonary effort is normal.      Breath sounds: No wheezing or rales.   Abdominal:      Palpations: Abdomen is soft.      Tenderness: There is no abdominal tenderness.   Musculoskeletal:      Cervical back: Normal range of motion.   Skin:     Findings: No rash.   Neurological:      Mental Status: Patient is alert and oriented to person, place, and time.   Psychiatric:         Mood and Affect: Mood and affect normal.         Judgment: Judgment normal.       Ortho Exam      RIGHT HAND: Scars well healed from previous surgeries. Sensation grossly intact. Neurovascular intact.  Radial pulse 2+, ulnar pulse 2+. Full extension " of the fingers. Limited DIP flexion of the 5th digit. No swelling. Tenderness about the A1 pulley. Decreased motion of PIP joint of 5th digit. Possible 5th flexor tendon rupture.       Orthopedic Injury Treatment    Date/Time: 3/15/2022 10:27 AM  Performed by: Candice Vo MD  Authorized by: Candice Vo MD   Injury location: finger  Location details: right little finger    Anesthesia:  Local anesthesia used: no    Sedation:  Patient sedated: no    Immobilization: splint  Splint type: wilmer straps.  Post-procedure neurovascular assessment: post-procedure neurovascularly intact  Patient tolerance: patient tolerated the procedure well with no immediate complications            Imaging Results (Most Recent)     Procedure Component Value Units Date/Time    XR Hand 2 View Right [[id number]] Resulted: 03/15/22 1005     Updated: 03/15/22 1006           Result Review :     X-Ray Report:  Right hand  X-Ray  Indication: Evaluation of right hand pain   AP and Lateral view(s)  Findings: No acute fracture or dislocation.   Prior studies available for comparison: no       Assessment and Plan     DX: Right 5th finger flexor tendon injury, questionable rupture     Discussed treatment plans and diagnosis with the patient. Referral to [facility] vs therapy discussed with the patient. Patient opted for a referral to [facility] at this time.     Call or return if worsening symptoms.    Follow Up     PRN.       Patient was given instructions and counseling regarding her condition or for health maintenance advice. Please see specific information pulled into the AVS if appropriate.     Scribed for Candice Vo MD by Marija Ordaz.  03/15/22   10:10 EDT    I have personally performed the services described in this document as scribed by the above individual and it is both accurate and complete. Candice Vo MD 03/15/22

## 2022-03-22 ENCOUNTER — TREATMENT (OUTPATIENT)
Dept: PHYSICAL THERAPY | Facility: CLINIC | Age: 82
End: 2022-03-22

## 2022-03-22 DIAGNOSIS — M25.641 DECREASED RANGE OF MOTION OF FINGER OF RIGHT HAND: Primary | ICD-10-CM

## 2022-03-22 DIAGNOSIS — M79.641 RIGHT HAND PAIN: ICD-10-CM

## 2022-03-22 PROCEDURE — 97165 OT EVAL LOW COMPLEX 30 MIN: CPT | Performed by: OCCUPATIONAL THERAPIST

## 2022-03-22 NOTE — PROGRESS NOTES
Occupational Therapy Initial Evaluation and Plan of Care    Patient: Breanna York   : 1940  Diagnosis/ICD-10 Code:  Decreased range of motion of finger of right hand [M25.641]  Referring practitioner: Candice Vo MD  Date of Initial Visit: 3/22/2022  Today's Date: 3/22/2022  Patient seen for 1 sessions           Subjective Questionnaire: QuickDASH:       Subjective Evaluation    History of Present Illness  Mechanism of injury: Pt is a RHD 80 y/o female who presents today with c/o pain in ulnar side of hand and decreased AROM in small finger on right hand. Pt reports symptoms appearing approximately 2 and a half weeks ago. Xrays negative. Pt is scheduled to see [facility] .    Pain  Current pain ratin  At best pain ratin  At worst pain ratin  Location: ulnar side right hand  Quality: sharp  Relieving factors: change in position and heat (massage)  Progression: worsening    Social Support  Lives with: alone    Hand dominance: right    Diagnostic Tests  X-ray: normal    Treatments  No previous or current treatments  Patient Goals  Patient goals for therapy: increased motion, decreased pain, increased strength, independence with ADLs/IADLs and return to sport/leisure activities             Objective          Tenderness     Additional Tenderness Details  No tenderness reported.    Neurological Testing     Additional Neurological Details  Pt displays no numbness in small finger right hand.    Active Range of Motion     Right Digits   Flexion   Little     PIP: 40 degrees    DIP: 0 degrees    Additional Active Range of Motion Details  Pt ROM WNL for small finger all planes except active DIP flexion. Passively pt is WNL for DIP flexion.    Strength/Myotome Testing     Additional Strength Details   strength N/A secondary to possible flexor tendon rupture.          Assessment & Plan     Assessment  Impairments: abnormal or restricted ROM, activity intolerance, impaired physical strength,  lacks appropriate home exercise program and pain with function  Functional Limitations: carrying objects, lifting, pulling, pushing and uncomfortable because of pain  Assessment details: Pt will not be seen for OT at this time secondary to possible flexor tendon rupture. Pt is to be evaluated by hand surgeon on 4/6/22.  Prognosis: good    Plan  Treatment plan discussed with: patient      Pt is not indicated for skilled occupational therapy services.  Un-Timed:  Electrical Stimulation:    0     mins  10712 ( );  Low Eval     30     Mins  55425  Mod Eval     0     Mins  23567  High Eval                       0     Mins  54107  Hot/cold packs    0     mins  92352    Timed Treatment:   0   mins   Total Treatment:     30   mins    OT SIGNATURE: Eduardo Castillo OT   [place] License: [id number]  DATE TREATMENT INITIATED: 3/22/2022    Initial Certification  Certification Period: 6/19/2022  I certify that the therapy services are furnished while this patient is under my care.  The services outlined above are required by this patient, and will be reviewed every 90 days.     PHYSICIAN: Candice Vo MD      DATE:   MD NPI: [id number]  Please sign and return via fax to [phone] . Thank you, [facility].

## 2022-03-30 ENCOUNTER — TELEPHONE (OUTPATIENT)
Dept: ORTHOPEDIC SURGERY | Facility: CLINIC | Age: 82
End: 2022-03-30

## 2022-03-30 NOTE — TELEPHONE ENCOUNTER
Provider: DR. SOLORZANO    Caller: PATIENT    Relationship to Patient: SELF      Phone Number:  [phone]    Reason for Call:  PT. HAS APPT. WITH [facility] NEXT WEEK ON Tuesday-04/05/22.   DR. SOLORZANO REFERRED HER.   THEY TOLD PT. TO GET ANY HAND XRAYS THAT SHE HAS HAD WITH DR. SOLORZANO ON DISC.   PLEASE CALL PT. TO LET HER KNOW WHEN READY TO .

## 2022-04-19 ENCOUNTER — LAB (OUTPATIENT)
Dept: LAB | Facility: HOSPITAL | Age: 82
End: 2022-04-19

## 2022-04-19 DIAGNOSIS — E55.9 VITAMIN D DEFICIENCY: ICD-10-CM

## 2022-04-19 DIAGNOSIS — E78.2 MIXED HYPERLIPIDEMIA: ICD-10-CM

## 2022-04-19 DIAGNOSIS — I10 ESSENTIAL HYPERTENSION: ICD-10-CM

## 2022-04-19 LAB
ALBUMIN SERPL-MCNC: 4.8 G/DL (ref 3.5–5.2)
ALBUMIN/GLOB SERPL: 1.7 G/DL
ALP SERPL-CCNC: 79 U/L (ref 39–117)
ALT SERPL W P-5'-P-CCNC: 16 U/L (ref 1–33)
ANION GAP SERPL CALCULATED.3IONS-SCNC: 10.7 MMOL/L (ref 5–15)
AST SERPL-CCNC: 27 U/L (ref 1–32)
BILIRUB SERPL-MCNC: 0.2 MG/DL (ref 0–1.2)
BUN SERPL-MCNC: 20 MG/DL (ref 8–23)
BUN/CREAT SERPL: 22.7 (ref 7–25)
CALCIUM SPEC-SCNC: 9.6 MG/DL (ref 8.6–10.5)
CHLORIDE SERPL-SCNC: 103 MMOL/L (ref 98–107)
CHOLEST SERPL-MCNC: 239 MG/DL (ref 0–200)
CO2 SERPL-SCNC: 23.3 MMOL/L (ref 22–29)
CREAT SERPL-MCNC: 0.88 MG/DL (ref 0.57–1)
EGFRCR SERPLBLD CKD-EPI 2021: 66.1 ML/MIN/1.73
GLOBULIN UR ELPH-MCNC: 2.9 GM/DL
GLUCOSE SERPL-MCNC: 84 MG/DL (ref 65–99)
HDLC SERPL-MCNC: 80 MG/DL (ref 40–60)
LDLC SERPL CALC-MCNC: 133 MG/DL (ref 0–100)
LDLC/HDLC SERPL: 1.62 {RATIO}
POTASSIUM SERPL-SCNC: 4.1 MMOL/L (ref 3.5–5.2)
PROT SERPL-MCNC: 7.7 G/DL (ref 6–8.5)
SODIUM SERPL-SCNC: 137 MMOL/L (ref 136–145)
TRIGL SERPL-MCNC: 148 MG/DL (ref 0–150)
VLDLC SERPL-MCNC: 26 MG/DL (ref 5–40)

## 2022-04-19 PROCEDURE — 36415 COLL VENOUS BLD VENIPUNCTURE: CPT

## 2022-04-19 PROCEDURE — 82652 VIT D 1 25-DIHYDROXY: CPT

## 2022-04-19 PROCEDURE — 80061 LIPID PANEL: CPT

## 2022-04-19 PROCEDURE — 80053 COMPREHEN METABOLIC PANEL: CPT

## 2022-04-20 PROBLEM — Z12.31 ENCOUNTER FOR SCREENING MAMMOGRAM FOR MALIGNANT NEOPLASM OF BREAST: Status: RESOLVED | Noted: 2021-10-19 | Resolved: 2022-04-20

## 2022-04-21 ENCOUNTER — OFFICE VISIT (OUTPATIENT)
Dept: INTERNAL MEDICINE | Facility: CLINIC | Age: 82
End: 2022-04-21

## 2022-04-21 VITALS
HEART RATE: 82 BPM | DIASTOLIC BLOOD PRESSURE: 86 MMHG | TEMPERATURE: 98.1 F | HEIGHT: 62 IN | WEIGHT: 129.4 LBS | BODY MASS INDEX: 23.81 KG/M2 | SYSTOLIC BLOOD PRESSURE: 147 MMHG | OXYGEN SATURATION: 98 %

## 2022-04-21 DIAGNOSIS — M19.91 PRIMARY LOCALIZED OSTEOARTHRITIS: ICD-10-CM

## 2022-04-21 DIAGNOSIS — E55.9 VITAMIN D DEFICIENCY: ICD-10-CM

## 2022-04-21 DIAGNOSIS — Z12.31 BREAST CANCER SCREENING BY MAMMOGRAM: ICD-10-CM

## 2022-04-21 DIAGNOSIS — I10 ESSENTIAL HYPERTENSION: Primary | ICD-10-CM

## 2022-04-21 DIAGNOSIS — R53.83 OTHER FATIGUE: ICD-10-CM

## 2022-04-21 DIAGNOSIS — M81.0 AGE-RELATED OSTEOPOROSIS WITHOUT CURRENT PATHOLOGICAL FRACTURE: ICD-10-CM

## 2022-04-21 DIAGNOSIS — E78.2 MIXED HYPERLIPIDEMIA: ICD-10-CM

## 2022-04-21 PROCEDURE — 99214 OFFICE O/P EST MOD 30 MIN: CPT | Performed by: INTERNAL MEDICINE

## 2022-04-21 NOTE — ASSESSMENT & PLAN NOTE
Patient with recent injections of bilateral knees by Dr. Vo as of her 10/21 office visit.  She has Voltaren she uses based on her activity level, denies any abdominal pain secondary to it.  Patient stable to continue current treatment regimen.---> Patient has weaned off of gabapentin as of her 4/22 office visit, and she still just rarely using the diclofenac.  Certainly reasonable to continue with current plan of care.

## 2022-04-21 NOTE — PROGRESS NOTES
"Chief Complaint  Hypertension (Pt is here for routine follow up. Pt has an area on her middle finger of the left hand that stings all the time. Pt would like to discuss the appointment with Raeann and Kleiner)    Subjective          Breanna York presents to [facility]     History of Present Illness  Pleasant 80 yo female with HTN, HYPERLIPIDEMIA, DJD, among others, coming in 4/22 for routine 6-month f/u.  We will go over her med list, review her labs in detail, and address any new concerns she may have.    Review of Systems   Constitutional: Negative for appetite change, fatigue and fever.   HENT: Negative for congestion and ear pain.    Eyes: Negative for blurred vision.   Respiratory: Negative for cough, chest tightness, shortness of breath and wheezing.    Cardiovascular: Negative for chest pain, palpitations and leg swelling.   Gastrointestinal: Negative for abdominal pain.   Genitourinary: Negative for difficulty urinating, dysuria and hematuria.   Musculoskeletal: Negative for arthralgias and gait problem.   Skin: Negative for skin lesions.   Neurological: Negative for syncope, memory problem and confusion.   Psychiatric/Behavioral: Negative for self-injury and depressed mood.       Objective   Vital Signs:   /86   Pulse 82   Temp 98.1 °F (36.7 °C)   Ht 157.5 cm (62.01\")   Wt 58.7 kg (129 lb 6.4 oz)   SpO2 98%   BMI 23.66 kg/m²           Physical Exam  Vitals and nursing note reviewed.   Constitutional:       General: She is not in acute distress.     Appearance: Normal appearance. She is not toxic-appearing.   HENT:      Head: Atraumatic.      Right Ear: External ear normal.      Left Ear: External ear normal.      Nose: Nose normal.      Mouth/Throat:      Mouth: Mucous membranes are moist.   Eyes:      General:         Right eye: No discharge.         Left eye: No discharge.      Extraocular Movements: Extraocular movements intact.      Pupils: Pupils are " equal, round, and reactive to light.   Cardiovascular:      Rate and Rhythm: Normal rate and regular rhythm.      Pulses: Normal pulses.      Heart sounds: Normal heart sounds. No murmur heard.    No gallop.   Pulmonary:      Effort: Pulmonary effort is normal. No respiratory distress.      Breath sounds: No wheezing, rhonchi or rales.   Abdominal:      General: There is no distension.      Palpations: Abdomen is soft. There is no mass.      Tenderness: There is no abdominal tenderness. There is no guarding.   Musculoskeletal:         General: No swelling or tenderness.      Cervical back: No tenderness.      Right lower leg: No edema.      Left lower leg: No edema.   Skin:     General: Skin is warm and dry.      Findings: No rash.   Neurological:      General: No focal deficit present.      Mental Status: She is alert and oriented to person, place, and time. Mental status is at baseline.      Motor: No weakness.      Gait: Gait normal.   Psychiatric:         Mood and Affect: Mood normal.         Thought Content: Thought content normal.          Result Review :   The following data was reviewed by: Topher Perera MD on 10/21/2021:                  Assessment and Plan    Diagnoses and all orders for this visit:    1. Essential hypertension (Primary)  Assessment & Plan:  Blood pressure with reasonable control as of her 4/22 office visit.  Patient is stable on single agent, moderate dose amlodipine.  Continue same.      Orders:  -     Comprehensive Metabolic Panel; Future    2. Mixed hyperlipidemia  Assessment & Plan:  LDL has trended up gradually from 120, to 147, and now it sits at 163.  I discussed with patient that her 10-year risk is 33%, so recommendations would be to lower her LDL by at least 50%.  She wants to continue with conservative dietary restrictions only though.  We will repeat level next year.---> LDL is down to 133 as of her 4/22 appointment.  This is almost a 20% drop with just conservative dietary  restrictions etc.  Additionally her HDL remains high at 80.  As noted below, her carotid doppler was negative for any stenosis, so I think we are fine continuing with conservative treatment of her hyperlipidemia.      Orders:  -     Lipid Panel; Future    3. Age-related osteoporosis without current pathological fracture  Overview:  BMD 1/18...spine -1.0, hip -2.4...intol to actonel so on Reclast #5/5 due 1/19---> BMD 10/20 = spine -1.2, hip -2.3 = stable.          Assessment & Plan:  We will repeat BMD in 2022, patient to continue with conservative treatment for now 4/22.    Orders:  -     DEXA Bone Density Axial; Future    4. Breast cancer screening by mammogram  -     Mammo Screening Digital Tomosynthesis Bilateral With CAD; Future    5. Primary localized osteoarthritis  Assessment & Plan:  Patient with recent injections of bilateral knees by Dr. Vo as of her 10/21 office visit.  She has Voltaren she uses based on her activity level, denies any abdominal pain secondary to it.  Patient stable to continue current treatment regimen.---> Patient has weaned off of gabapentin as of her 4/22 office visit, and she still just rarely using the diclofenac.  Certainly reasonable to continue with current plan of care.        6. Vitamin D deficiency  Assessment & Plan:  Vitamin D is 54 as of her 10/21 office visit.  She is stable on low-dose over-the-counter supplementation, continue same.---> Level not obtained 4/22, will repeat this on return to office, patient to continue current over the counter low-dose treatment.      Orders:  -     Vitamin D 25 Hydroxy; Future    7. Other fatigue  -     CBC & Differential; Future     --  --  OLDER NOTES:  URGENT VISIT 10/20:  L NECK PAIN just like 12/16 note below; I d/w C-spine series and take aleve 1 tab bid---> signif DJD as expected; did not have to stay on aleve; no radiation;   TACHYCARDIA = HR up 70 to 90+; has CBC/TSH conrad, so will review them when done and eval further on RTO;  no CP...TSH neg, CBC with Hgb 11.2; since resolved; was related to Covid infection she said.  COVID-19 + on Labor Day.  --  VISIT 4/21 = above/below.  ANNUAL MEDICARE WELLNESS 4/21 = d/w all forms in office; no new discoveries; Narciso neg.  --  MILD ANEMIA as of 10/20 OV=11.2, down 1 gram past year; no bleeding; f/u on RTO---> Hgb 12 with nl iron as of 4/21, so defer f/u.  --  HTN remains controlled off ACEI as of 7/18 OV; bring machine in=150's at home (stopped ACEI due to severe angiodedema following bug bites); will use norvasc 2.5 if BP trends back up...start now 1/19...better 4/19---> holding 10/19 on these=ditto 4/21.  ?CKD3 = 53% in 4/20 = watch on RTO---> fine 10/20 and same 4/21.  --  LIPIDS at goal '12 w/o tx...ditto 8/15 with HDL 86..., but no tx unless event...141...121 with HDL 95, so defer tx 10/19---> , so I d/w get it back in the 120's please.  --  C/O R HIP PAIN...exam with excellent ROM, neg SLR, point tenderness c/w bursitis...try meds for now and call...better and then returned, no trauma, intermittent, worse up stairs, but able to mow/trim 5 acres, PTA for eval/HEP...worse after PT, s/p injection per ortho in [place] and prn mobic... HIP now, saw ortho in [place], story sounds radicular, but exam more c/w bursitis, so try medrol and keep using heat; also told me ortho was going to [facility] and I rec gabapentin again...MRI hip=bursitis and spine with DJD; things are better and only tolerating low dose gabapentin...stable off=ran out and no worse.  R KNEE PAIN=prior scopes; sx's flaring 1/18, but not severe yet, so no new tx rec...get steroid shots per Dr Vo prn---> L knee issues as of 4/21 and is in PT.  S/P R r TSR on 4/17/19 per Dr Vo...has completed rehab as of 10/19 OV; no pain meds needed.  --  SHINGLES rash is drying up, no secondary infection, but is very sensitive; c/w meds and will push dose...she's weaning dose, but still with sxs 6/17.  L NECK PAIN needing to  take ASA daily now; ? CINDY vs carotid calcification, no bruit; needs CT and ? dopplers/etc; please call...it was neg.  BALANCE ISSUES past few months, just has to occ grab wall=needs HCT as well...it was neg  --  HYPERCALCEMIA S/P RESECTION 9/15... PTH 40...PTH 73 and is trending up, but Ca++ 8.8 is stable 1/18...PTH 48...40 in 10/19 and will stop checking.    OSTEOPOROSIS...BMD 1/18...spine -1.0, hip -2.4...intol to actonel so on Reclast #5/5 due 1/19---> BMD 10/20 = spine -1.2, hip -2.3 = stable.  VIT D DEF stable (on 2K qd)...55 in 1/19---> 58 in 10/20.  --  GERD/ABD PAIN...to ER 11/13 with CT=mild prominence of CBD and pancreatic duct, labs neg...no melena, no blood...sx's better with 2x PPI...rare sxs 1/18 on no meds, so call if recurrent and would use H2 first line...dysphagia as of 1/19 OV, to water at times, so needs MBSS to start...non-issue.  --  DEPRESSION/INSOMNIA with 's passing...try trazodone first, SSRI if no benefit or intol...with prn ambien...better with PRN restoril.   --  PETICHIAL LESIONS 8/19 on legs=she showed me pictures at 10/19 OV; said it was non-blanchable, no itch, + stinging though; lasted 3-4 days, started in one spot on LLE and spread; I d/w call when recurs and will get labs.  FACIAL SWELLING 4/18 =nasal and bilateral inner eyelids/etc; top of scalp with scabbed area, but no tick/etc; associated erythema; lungs clear, no dysphagia; will treat with abx as well as prednisone and take benadryl as well... recurred 2 weeks later, tried ice/benaryl, to ER, given keflex/bactrim/steroid shot; no dysphagia nor breathing issues with either episode; did have mosquito bite; pics reviewed; might as well finish out abx; rec no ACEI and zyrtec 10 mg bid for now; wear bug sprays if going out.  --  --  MMG 11/22/19 per me and is pending as of 4/21 OV.  EGD 7/12...erosive gastritis.  COLON 7/12...normal...defer.   REFUSES flu/pneumo; Shingrix x2; (Pt had Covid-19 in 9/20); rec COVID vac  4/21.  ( '12...after 57 yrs, 4 boys with 1 here and 2 in [place], 1 in ---> had 47 for holidays q year for both holidays=has 22 great-grand already).    Follow Up   Return in about 6 months (around 10/31/2022).  Patient was given instructions and counseling regarding her condition or for health maintenance advice. Please see specific information pulled into the AVS if appropriate.

## 2022-04-21 NOTE — ASSESSMENT & PLAN NOTE
Blood pressure with reasonable control as of her 4/22 office visit.  Patient is stable on single agent, moderate dose amlodipine.  Continue same.

## 2022-04-21 NOTE — ASSESSMENT & PLAN NOTE
LDL has trended up gradually from 120, to 147, and now it sits at 163.  I discussed with patient that her 10-year risk is 33%, so recommendations would be to lower her LDL by at least 50%.  She wants to continue with conservative dietary restrictions only though.  We will repeat level next year.---> LDL is down to 133 as of her 4/22 appointment.  This is almost a 20% drop with just conservative dietary restrictions etc.  Additionally her HDL remains high at 80.  As noted below, her carotid doppler was negative for any stenosis, so I think we are fine continuing with conservative treatment of her hyperlipidemia.

## 2022-04-22 LAB — 1,25(OH)2D SERPL-MCNC: 73.1 PG/ML (ref 19.9–79.3)

## 2022-07-09 DIAGNOSIS — G89.29 CHRONIC PAIN WITH DRUG DEPENDENCE: Primary | ICD-10-CM

## 2022-07-09 DIAGNOSIS — F19.20 CHRONIC PAIN WITH DRUG DEPENDENCE: Primary | ICD-10-CM

## 2022-07-09 RX ORDER — GABAPENTIN 100 MG/1
200 CAPSULE ORAL NIGHTLY
Qty: 180 CAPSULE | Refills: 1 | Status: SHIPPED | OUTPATIENT
Start: 2022-07-09 | End: 2022-12-13

## 2022-07-11 ENCOUNTER — TRANSCRIBE ORDERS (OUTPATIENT)
Dept: ADMINISTRATIVE | Facility: HOSPITAL | Age: 82
End: 2022-07-11

## 2022-07-11 DIAGNOSIS — M25.531 RIGHT WRIST PAIN: ICD-10-CM

## 2022-07-11 DIAGNOSIS — M79.641 RIGHT HAND PAIN: Primary | ICD-10-CM

## 2022-07-14 ENCOUNTER — OFFICE VISIT (OUTPATIENT)
Dept: ORTHOPEDIC SURGERY | Facility: CLINIC | Age: 82
End: 2022-07-14

## 2022-07-14 VITALS — BODY MASS INDEX: 24.51 KG/M2 | HEIGHT: 62 IN | WEIGHT: 133.2 LBS

## 2022-07-14 DIAGNOSIS — M25.561 PAIN IN BOTH KNEES, UNSPECIFIED CHRONICITY: Primary | ICD-10-CM

## 2022-07-14 DIAGNOSIS — M25.562 PAIN IN BOTH KNEES, UNSPECIFIED CHRONICITY: Primary | ICD-10-CM

## 2022-07-14 DIAGNOSIS — M17.0 PRIMARY OSTEOARTHRITIS OF KNEES, BILATERAL: ICD-10-CM

## 2022-07-14 PROCEDURE — 20610 DRAIN/INJ JOINT/BURSA W/O US: CPT | Performed by: ORTHOPAEDIC SURGERY

## 2022-07-14 PROCEDURE — 99213 OFFICE O/P EST LOW 20 MIN: CPT | Performed by: ORTHOPAEDIC SURGERY

## 2022-07-14 RX ORDER — LIDOCAINE HYDROCHLORIDE 10 MG/ML
5 INJECTION, SOLUTION INFILTRATION; PERINEURAL
Status: COMPLETED | OUTPATIENT
Start: 2022-07-14 | End: 2022-07-14

## 2022-07-14 RX ADMIN — LIDOCAINE HYDROCHLORIDE 5 ML: 10 INJECTION, SOLUTION INFILTRATION; PERINEURAL at 14:23

## 2022-07-14 RX ADMIN — LIDOCAINE HYDROCHLORIDE 5 ML: 10 INJECTION, SOLUTION INFILTRATION; PERINEURAL at 14:22

## 2022-07-14 NOTE — PROGRESS NOTES
"Chief Complaint  Initial Evaluation of the Right Knee and Initial Evaluation of the Left Knee     Subjective      Breanna York presents to [facility] for an evaluation of bilateral knees. She has a history of bilateral knee osteoarthritis. She has had injections in the past. Her right knee is worse than the left, according to the patient. She denies any injuries or trauma. She states having a flare up that caused her to make an appointment. Pain has improved.     Allergies   Allergen Reactions   • Meperidine Unknown - High Severity   • Latex Rash        Social History     Socioeconomic History   • Marital status:    Tobacco Use   • Smoking status: Never Smoker   • Smokeless tobacco: Never Used   Vaping Use   • Vaping Use: Never used   Substance and Sexual Activity   • Alcohol use: Never   • Drug use: Never   • Sexual activity: Defer        Review of Systems     Objective   Vital Signs:   Ht 157.5 cm (62\")   Wt 60.4 kg (133 lb 3.2 oz)   BMI 24.36 kg/m²       Physical Exam  Constitutional:       Appearance: Normal appearance. Patient is well-developed and normal weight.   HENT:      Head: Normocephalic.      Right Ear: Hearing and external ear normal.      Left Ear: Hearing and external ear normal.      Nose: Nose normal.   Eyes:      Conjunctiva/sclera: Conjunctivae normal.   Cardiovascular:      Rate and Rhythm: Normal rate.   Pulmonary:      Effort: Pulmonary effort is normal.      Breath sounds: No wheezing or rales.   Abdominal:      Palpations: Abdomen is soft.      Tenderness: There is no abdominal tenderness.   Musculoskeletal:      Cervical back: Normal range of motion.   Skin:     Findings: No rash.   Neurological:      Mental Status: Patient is alert and oriented to person, place, and time.   Psychiatric:         Mood and Affect: Mood and affect normal.         Judgment: Judgment normal.       Ortho Exam      BILATERAL KNEES: Crepitus with motion. Full extension " bilaterally. Flexion to 120 degrees bilaterally. Tender medial joint line. Good strength to hamstrings, quadriceps, dorsiflexors and plantar flexors. Stable to varus/valgus stress. Stable anterior and posterior drawer. Negative lachman. No swelling, skin discoloration or atrophy.       Large Joint Arthrocentesis  Date/Time: 7/14/2022 2:22 PM  Consent given by: patient  Site marked: site marked  Timeout: Immediately prior to procedure a time out was called to verify the correct patient, procedure, equipment, support staff and site/side marked as required   Supporting Documentation  Indications: pain   Procedure Details  Location: right knee.  Needle gauge: 21g.  Medications administered: 32 mg Triamcinolone Acetonide 32 MG; 5 mL lidocaine 1 %  Patient tolerance: patient tolerated the procedure well with no immediate complications    Large Joint Arthrocentesis  Date/Time: 7/14/2022 2:23 PM  Consent given by: patient  Site marked: site marked  Timeout: Immediately prior to procedure a time out was called to verify the correct patient, procedure, equipment, support staff and site/side marked as required   Supporting Documentation  Indications: pain   Procedure Details  Location: left knee.  Needle gauge: 21g.  Medications administered: 32 mg Triamcinolone Acetonide 32 MG; 5 mL lidocaine 1 %  Patient tolerance: patient tolerated the procedure well with no immediate complications            Imaging Results (Most Recent)     Procedure Component Value Units Date/Time    XR Knee 3 View Right [[id number]] Resulted: 07/14/22 1405     Updated: 07/14/22 1409    XR Knee 3 View Left [[id number]] Resulted: 07/14/22 1405     Updated: 07/14/22 1409           Result Review :     X-Ray Report:  Bilateral knee(s) X-Ray  Indication: Evaluation of bilateral knees   AP, Lateral and Standing view(s)  Findings: Bilateral knee osteoarthritis, no acute fractures or dislocation.   Prior studies available for comparison: no     Assessment and Plan      Diagnoses and all orders for this visit:    1. Pain in both knees, unspecified chronicity (Primary)  -     XR Knee 3 View Right  -     XR Knee 3 View Left    2. Primary osteoarthritis of knees, bilateral        She is a candidate for total knee replacement. She wants to continue conservative measures. Bilateral zilretta injections given, she tolerated this well.     Call or return if worsening symptoms.    Follow Up     PRN.       Patient was given instructions and counseling regarding her condition or for health maintenance advice. Please see specific information pulled into the AVS if appropriate.     Scribed for Candice Vo MD by Marija Ordaz.  07/14/22   14:13 EDT    I have personally performed the services described in this document as scribed by the above individual and it is both accurate and complete. Candice Vo MD 07/14/22

## 2022-07-29 ENCOUNTER — HOSPITAL ENCOUNTER (OUTPATIENT)
Dept: MRI IMAGING | Facility: HOSPITAL | Age: 82
Discharge: HOME | End: 2022-07-29
Admitting: SURGERY

## 2022-07-29 ENCOUNTER — APPOINTMENT (OUTPATIENT)
Dept: MRI IMAGING | Facility: HOSPITAL | Age: 82
End: 2022-07-29

## 2022-07-29 DIAGNOSIS — M25.531 RIGHT WRIST PAIN: ICD-10-CM

## 2022-07-29 DIAGNOSIS — M79.641 RIGHT HAND PAIN: ICD-10-CM

## 2022-07-29 PROCEDURE — 73218 MRI UPPER EXTREMITY W/O DYE: CPT

## 2022-08-25 ENCOUNTER — TREATMENT (OUTPATIENT)
Dept: PHYSICAL THERAPY | Facility: CLINIC | Age: 82
End: 2022-08-25

## 2022-08-25 ENCOUNTER — TRANSCRIBE ORDERS (OUTPATIENT)
Dept: PHYSICAL THERAPY | Facility: CLINIC | Age: 82
End: 2022-08-25

## 2022-08-25 DIAGNOSIS — M65.351 TRIGGER LITTLE FINGER OF RIGHT HAND: Primary | ICD-10-CM

## 2022-08-25 DIAGNOSIS — M65.9 TENOSYNOVITIS OF FINGER: ICD-10-CM

## 2022-08-25 DIAGNOSIS — M79.644 PAIN OF FINGER OF RIGHT HAND: ICD-10-CM

## 2022-08-25 DIAGNOSIS — M25.641 STIFFNESS OF FINGER JOINT OF RIGHT HAND: ICD-10-CM

## 2022-08-25 PROCEDURE — 97022 WHIRLPOOL THERAPY: CPT | Performed by: OCCUPATIONAL THERAPIST

## 2022-08-25 PROCEDURE — 97530 THERAPEUTIC ACTIVITIES: CPT | Performed by: OCCUPATIONAL THERAPIST

## 2022-08-25 PROCEDURE — 97167 OT EVAL HIGH COMPLEX 60 MIN: CPT | Performed by: OCCUPATIONAL THERAPIST

## 2022-08-25 NOTE — PROGRESS NOTES
Outpatient Occupational Therapy Ortho Initial Evaluation    Patient: Breanna York   : 1940  Diagnosis/ICD-10 Code:  Trigger little finger of right hand [M65.351]  Referring practitioner: Patricio Carmona MD  Date of Initial Visit: 2022  Today's Date: 2022  Patient seen for 1 sessions               Subjective Questionnaire: QuickDASH: 43      Subjective Evaluation    History of Present Illness  Date of onset: 3/1/2022  Mechanism of injury: In 2022 she began to develop stiffness in R SF DIP joint, referred to Dr. Carmona and got an injection in R SF without relief.  MRI showed no rupture of tendon.  Currently loosing AROM in R LF, RF,SF.  Having pain at [place] in R wrist and over carpal tunnel      Patient Occupation: retired - lives alone and does all yard and household work Quality of life: excellent    Pain  Current pain ratin  At worst pain ratin (after working in the yard)  Quality: burning, radiating and sharp (shooting pains)  Relieving factors: rest  Aggravating factors: overhead activity and repetitive movement (yard work, gripping, sweeping, vacuuming, mopping)  Progression: worsening    Social Support  Lives in: multiple-level home  Lives with: alone    Hand dominance: right    Diagnostic Tests  X-ray: normal  MRI studies: normal    Treatments  Previous treatment: injection treatment  Patient Goals  Patient goals for therapy: decreased pain, return to sport/leisure activities, independence with ADLs/IADLs and increased strength           Past Medical hx: R CMC arthroplasty and LF FDS repair ,  Cubital tunnel release, Pronator teres release and Carpal tunnel release. Skin cancer on face, R reverse total shoulder , parathyroid tumor removal 2013, noncancerous    Objective          Observations     Additional Wrist/Hand Observation Details  Cervical tightness noted on R side, tight scalenes, upper trap, anterior neck edema noted.   R sided hand stiffness may be due to  neural tightness along R UE from neck through arm into hand.     Neurological Testing     Sensation     Wrist/Hand     Right   Diminished: light touch    Comments   Right light touch: thumb 3.61; 2.83 all other digits.    Active Range of Motion     Right Wrist   Wrist flexion: 55 degrees   Wrist extension: 50 degrees     Additional Active Range of Motion Details  0-90 0-105 25-45  0-85 0-105 15-45  0-90 0-105 0-50  0-80 0-75 0-15    Swelling     Left Wrist/Hand   Circumference MCP: 18.2 cm  Circumference wrist: 15.3 cm    Right Wrist/Hand   Circumference MCP: 18 cm  Circumference wrist: 16 cm          Assessment & Plan     Assessment  Impairments: abnormal coordination, abnormal muscle firing, abnormal or restricted ROM, activity intolerance, impaired physical strength, lacks appropriate home exercise program, pain with function, safety issue and weight-bearing intolerance  Functional Limitations: carrying objects, lifting, pulling, pushing, reaching behind back, reaching overhead and unable to perform repetitive tasks  Assessment details: Pt presents with R wrist and 3-5th digit pain, limited motion of R SF.  Stiffness in R side of cervical spine, tenderness in C5-7 posteriorly.  Pt having radiating pain, dropping items, decreased strength along ulnar nerve path.  Tenderness volar forearm at pronator teres.  Hx of neural tightness in R UE, tumor removed on R side of neck from thyroid resulting in scar tissue development, and R reverse total shoulder.   Prognosis: good    Goals  Plan Goals: 1. The patient complains of pain in the R wrist and hand                  LTG 1: 12 weeks:  The patient will report a pain rating of 0/10 or better in order to improve sleep quality and tolerance to performance of activities of daily living.                                  STATUS:  New                  STG 1a: 4weeks:  The patient will report a pain rating of 6/10 or better.                                   STATUS:  New  2. The  patient has limited ROM of the R SF                  LTG 2: 12 weeks:  The patient will demonstrate 240 degrees of TOVAR to allow the patient to  mop.                                  STATUS:  New                   STG 2a: 4 weeks:  The patient will demonstrate 200 degrees of TOVAR.                                  STATUS:  New                             3. The patient has limited strength of the R UE.                  LTG 3: 12 weeks:  The patient will demonstrate 40# in order to do yard work.                                  STATUS:  New                  STG 3a: 4 weeks:  The patient will demonstrate tolerance to light st strengthening.                                  STATUS:  New  4. Carrying, Moving, and Handling Objects Functional Limitation                                   LTG 4: 12 weeks:  The patient will demonstrate 0% limitation by achieving a score of 11 on the Quick DASH.                                  STATUS:  New                  STG 4a: 4 weeks:  The patient will demonstrate 40-59% limitation by achieving a score of 30 on the Quick DASH.                                    STATUS:  New                    TREATMENT: Orthotic fabrication/fitting/management and training, Manual therapy, therapeutic exercise, home exercise instruction, and modalities as needed to include: electrical stimulation, ultrasound, moist heat, paraffin, fluidotherapy and ice.      Plan  Planned modality interventions: TENS, thermotherapy (hydrocollator packs), thermotherapy (paraffin bath), ultrasound and electrical stimulation/Russian stimulation  Other planned modality interventions: fluidotherapy  Planned therapy interventions: manual therapy, ADL retraining, motor coordination training, neuromuscular re-education, soft tissue mobilization, fine motor coordination training, body mechanics training, balance/weight-bearing training, functional ROM exercises, flexibility, spinal/joint mobilization, strengthening, stretching,  therapeutic activities, IADL retraining, joint mobilization and home exercise program  Frequency: 2x week  Duration in weeks: 12  Treatment plan discussed with: patient        Patient is indicated for skilled occupational therapy services.    History # of Personal Factors and/or Comorbidities: HIGH (3+)  Examination of Body System(s): # of elements: HIGH (4+)  Clinical Presentation: EVOLVING  Clinical Decision Making: HIGH     Evaluation:  Low Complexity:    0     mins  24155;  Mod Complexity:    0     mins  63438;  High Complexity:    30     mins  10244;    Timed:  Manual Therapy:    0     mins  98544;  Therapeutic Exercise:    0     mins  42236;  Therapeutic Activity:    10     mins  74784;     Neuromuscular Julia:    10    mins  83918;    Ultrasound:     0     mins  70202;    Electrical Stimulation:    0     mins  07523;    Untimed:  Electrical Stimulation:    0     mins  68556 ( );  Fluidotherapy:        10    mins  77216  Paraffin:                          0    mins  19423    Timed Treatment:   20   mins   Total Treatment:     60   mins      OT SIGNATURE: JACKLYN Garcia, OTR/L, CHT     Electronically signed    [place] LICENSE: [id number]   DATE TREATMENT INITIATED: 8/25/2022    Initial Certification  Certification Period: 8/25/2022 thru 11/22/2022  I certify that the therapy services are furnished while this patient is under my care.  The services outlined above are required by this patient, and will be reviewed every 90 days.     Signature:______________________________________________ PHYSICIAN:  Patricio Carmona MD   NPI: [id number]                                      DATE:    Please sign and return via fax to [phone]   Thank you, [facility].

## 2022-08-29 ENCOUNTER — TREATMENT (OUTPATIENT)
Dept: PHYSICAL THERAPY | Facility: CLINIC | Age: 82
End: 2022-08-29

## 2022-08-29 DIAGNOSIS — M79.641 RIGHT HAND PAIN: ICD-10-CM

## 2022-08-29 DIAGNOSIS — M65.351 TRIGGER LITTLE FINGER OF RIGHT HAND: Primary | ICD-10-CM

## 2022-08-29 DIAGNOSIS — M65.9 TENOSYNOVITIS OF FINGER: ICD-10-CM

## 2022-08-29 DIAGNOSIS — M25.641 DECREASED RANGE OF MOTION OF FINGER OF RIGHT HAND: ICD-10-CM

## 2022-08-29 DIAGNOSIS — M79.644 PAIN OF FINGER OF RIGHT HAND: ICD-10-CM

## 2022-08-29 DIAGNOSIS — M25.641 STIFFNESS OF FINGER JOINT OF RIGHT HAND: ICD-10-CM

## 2022-08-29 PROCEDURE — 97112 NEUROMUSCULAR REEDUCATION: CPT | Performed by: OCCUPATIONAL THERAPIST

## 2022-08-29 PROCEDURE — 97530 THERAPEUTIC ACTIVITIES: CPT | Performed by: OCCUPATIONAL THERAPIST

## 2022-08-29 NOTE — PROGRESS NOTES
"Occupational Therapy Daily Treatment Note      Patient: Breanna York   : 1940  Referring practitioner: Patricio Carmona MD  Date of Initial Visit: Type: THERAPY  Noted: 2022  Today's Date: 2022  Patient seen for 2 sessions    ICD-10-CM ICD-9-CM   1. Trigger little finger of right hand  M65.351 727.03   2. Tenosynovitis of finger  M65.9 727.05   3. Stiffness of finger joint of right hand  M25.641 719.54   4. Pain of finger of right hand  M79.644 729.5   5. Decreased range of motion of finger of right hand  M25.641 719.54   6. Right hand pain  M79.641 729.5          Breanna York reports I am sore in my neck, upper trap.  2/10 pain more in upper area/neck, this pain (guyon's canal) is getting better        Objective   See Exercise, Manual, and Modality Logs for complete treatment.   Having a \"catch\" with levator scap stretch, held this date to try to open up anterior shoulder first and reduce pressure.       After ASTYM SF TOVAR  0-105  0-90  0-20      Assessment/Plan  Increased FDS mobility this date, pin point tenderness at guyon's canal is improving. R upper trap/shoulder sore from mobilization.      Cont per POC           Timed:  Manual Therapy:    10     mins  88876;  Therapeutic Exercise:    0     mins  43835;  Therapeutic Activity:    10     mins  39790;     Neuromuscular Julia:    10    mins  03198;    Ultrasound:     0     mins  07411;    Electrical Stimulation:    0     mins  22415;    Untimed:  Electrical Stimulation:    0     mins  86922 ( );  Fluidotherapy:        0    mins  45129  Paraffin:                          0    mins  38042    Timed Treatment:   30   mins   Total Treatment:     30   mins    OT SIGNATURE: JACKLYN Garcia, OTR/L, CHT     Electronically signed    [place] LICENSE: [id number]     "

## 2022-09-01 ENCOUNTER — TREATMENT (OUTPATIENT)
Dept: PHYSICAL THERAPY | Facility: CLINIC | Age: 82
End: 2022-09-01

## 2022-09-01 DIAGNOSIS — M79.641 RIGHT HAND PAIN: ICD-10-CM

## 2022-09-01 DIAGNOSIS — M25.641 DECREASED RANGE OF MOTION OF FINGER OF RIGHT HAND: ICD-10-CM

## 2022-09-01 DIAGNOSIS — M25.641 STIFFNESS OF FINGER JOINT OF RIGHT HAND: ICD-10-CM

## 2022-09-01 DIAGNOSIS — M65.351 TRIGGER LITTLE FINGER OF RIGHT HAND: Primary | ICD-10-CM

## 2022-09-01 DIAGNOSIS — M65.9 TENOSYNOVITIS OF FINGER: ICD-10-CM

## 2022-09-01 DIAGNOSIS — M79.644 PAIN OF FINGER OF RIGHT HAND: ICD-10-CM

## 2022-09-01 PROCEDURE — 97530 THERAPEUTIC ACTIVITIES: CPT | Performed by: OCCUPATIONAL THERAPIST

## 2022-09-01 PROCEDURE — 97112 NEUROMUSCULAR REEDUCATION: CPT | Performed by: OCCUPATIONAL THERAPIST

## 2022-09-01 NOTE — PROGRESS NOTES
Occupational Therapy Daily Treatment Note      Patient: Breanna York   : 1940  Referring practitioner: Patricio Carmona MD  Date of Initial Visit: Type: THERAPY  Noted: 2022  Today's Date: 2022  Patient seen for 3 sessions    ICD-10-CM ICD-9-CM   1. Trigger little finger of right hand  M65.351 727.03   2. Tenosynovitis of finger  M65.9 727.05   3. Stiffness of finger joint of right hand  M25.641 719.54   4. Pain of finger of right hand  M79.644 729.5   5. Decreased range of motion of finger of right hand  M25.641 719.54   6. Right hand pain  M79.641 729.5          Breanna York reports I wrote for a while and my RF (PIP) joint is painful now.  Pt reports Guyon's canal is less tender than last week.  Using heat at home which is helping. 2/10 pain to R UE.     Objective   See Exercise, Manual, and Modality Logs for complete treatment.   bruised a little from the manual work      Assessment/Plan  Pt is progressing with DIP flexion of 5th digit when digits 2-4 are flat fisted       Cont per POC           Timed:  Manual Therapy:    10     mins  61592;  Therapeutic Exercise:    0     mins  92117;  Therapeutic Activity:    10     mins  46977;     Neuromuscular Julia:    10    mins  82812;    Ultrasound:     0     mins  35408;    Electrical Stimulation:    0     mins  95887;    Untimed:  Electrical Stimulation:    0     mins  47012 ( );  Fluidotherapy:        0    mins  16566  Paraffin:                          0    mins  22208    Timed Treatment:   30   mins   Total Treatment:     30   mins    OT SIGNATURE: JACKLYN Garcia, OTR/L, CHT     Electronically signed    [place] LICENSE: [id number]

## 2022-09-06 ENCOUNTER — TREATMENT (OUTPATIENT)
Dept: PHYSICAL THERAPY | Facility: CLINIC | Age: 82
End: 2022-09-06

## 2022-09-06 DIAGNOSIS — M65.9 TENOSYNOVITIS OF FINGER: ICD-10-CM

## 2022-09-06 DIAGNOSIS — M65.351 TRIGGER LITTLE FINGER OF RIGHT HAND: Primary | ICD-10-CM

## 2022-09-06 DIAGNOSIS — M25.641 DECREASED RANGE OF MOTION OF FINGER OF RIGHT HAND: ICD-10-CM

## 2022-09-06 DIAGNOSIS — M25.641 STIFFNESS OF FINGER JOINT OF RIGHT HAND: ICD-10-CM

## 2022-09-06 DIAGNOSIS — M79.644 PAIN OF FINGER OF RIGHT HAND: ICD-10-CM

## 2022-09-06 DIAGNOSIS — M79.641 RIGHT HAND PAIN: ICD-10-CM

## 2022-09-06 PROCEDURE — 97530 THERAPEUTIC ACTIVITIES: CPT | Performed by: OCCUPATIONAL THERAPIST

## 2022-09-06 PROCEDURE — 97112 NEUROMUSCULAR REEDUCATION: CPT | Performed by: OCCUPATIONAL THERAPIST

## 2022-09-06 NOTE — PROGRESS NOTES
Occupational Therapy Daily Treatment Note      Patient: Breanna York   : 1940  Referring practitioner: Patricio Carmona MD  Date of Initial Visit: Type: THERAPY  Noted: 2022  Today's Date: 2022  Patient seen for 4 sessions    ICD-10-CM ICD-9-CM   1. Trigger little finger of right hand  M65.351 727.03   2. Tenosynovitis of finger  M65.9 727.05   3. Stiffness of finger joint of right hand  M25.641 719.54   4. Pain of finger of right hand  M79.644 729.5   5. Decreased range of motion of finger of right hand  M25.641 719.54   6. Right hand pain  M79.641 729.5          Breanna York reports at rest I have no pain, with movement 3/10 pain.  After last visit I had to take a pain pill because it kept me up at night. Gripping causing excruciating pain.         Objective   See Exercise, Manual, and Modality Logs for complete treatment.   Right Wrist   Wrist flexion: 65 degrees   Wrist extension: 55 degrees     Additional Active Range of Motion Details  0-90    -   25-45  0-85     0-105   5-45  0-95     0-95      0-15  0-100   0-80     0-15    Kinesiotape applied to Guyon's canal for   Assessment/Plan  Pt has increased AROM of wrist and digits, although still having difficulty not over doing it with household tasks and allowing R hand to rest some.       Cont per POC           Timed:  Manual Therapy:    10     mins  05267;  Therapeutic Exercise:    0     mins  61795;  Therapeutic Activity:    10     mins  40907;     Neuromuscular Julia:    10    mins  49797;    Ultrasound:     0     mins  91529;    Electrical Stimulation:    0     mins  47635;    Untimed:  Electrical Stimulation:    0     mins  58903 ( );  Fluidotherapy:        0    mins  96576  Paraffin:                          0    mins  72792    Timed Treatment:   30   mins   Total Treatment:     30   mins    OT SIGNATURE: JACKLYN Garcia, OTR/L, CHT     Electronically signed    [place] LICENSE: [id number]

## 2022-09-13 ENCOUNTER — TREATMENT (OUTPATIENT)
Dept: PHYSICAL THERAPY | Facility: CLINIC | Age: 82
End: 2022-09-13

## 2022-09-13 ENCOUNTER — TELEPHONE (OUTPATIENT)
Dept: INTERNAL MEDICINE | Facility: CLINIC | Age: 82
End: 2022-09-13

## 2022-09-13 DIAGNOSIS — M79.641 RIGHT HAND PAIN: ICD-10-CM

## 2022-09-13 DIAGNOSIS — M65.351 TRIGGER LITTLE FINGER OF RIGHT HAND: Primary | ICD-10-CM

## 2022-09-13 DIAGNOSIS — M65.9 TENOSYNOVITIS OF FINGER: ICD-10-CM

## 2022-09-13 DIAGNOSIS — M25.641 DECREASED RANGE OF MOTION OF FINGER OF RIGHT HAND: ICD-10-CM

## 2022-09-13 DIAGNOSIS — M25.641 STIFFNESS OF FINGER JOINT OF RIGHT HAND: ICD-10-CM

## 2022-09-13 DIAGNOSIS — M79.644 PAIN OF FINGER OF RIGHT HAND: ICD-10-CM

## 2022-09-13 PROCEDURE — 97530 THERAPEUTIC ACTIVITIES: CPT | Performed by: OCCUPATIONAL THERAPIST

## 2022-09-13 PROCEDURE — 97112 NEUROMUSCULAR REEDUCATION: CPT | Performed by: OCCUPATIONAL THERAPIST

## 2022-09-13 NOTE — TELEPHONE ENCOUNTER
Physical therapist has been seeing patient since March for her hand numbness and fingers.  It has progressed and getting worse.  Dr Medina and Kleinert saw her and ordered MRI and therapy.  Could you please send referral for neurologist?  Thinks it is coming from C6 & C7.

## 2022-09-13 NOTE — TELEPHONE ENCOUNTER
Caller: Breanna York    Relationship: Self    Best call back number: [phone]    What is the best time to reach you: ANY    Who are you requesting to speak with (clinical staff, provider,  specific staff member): PB COREAS    What was the call regarding: PATIENT WOULD LIKE TO DISCUSS A REFERRAL    Do you require a callback: YES

## 2022-09-13 NOTE — PROGRESS NOTES
Occupational Therapy Daily Treatment Note      Patient: Breanna York   : 1940  Referring practitioner: Patricio Carmona MD  Date of Initial Visit: Type: THERAPY  Noted: 2022  Today's Date: 2022  Patient seen for 5 sessions    ICD-10-CM ICD-9-CM   1. Trigger little finger of right hand  M65.351 727.03   2. Tenosynovitis of finger  M65.9 727.05   3. Stiffness of finger joint of right hand  M25.641 719.54   4. Pain of finger of right hand  M79.644 729.5   5. Decreased range of motion of finger of right hand  M25.641 719.54   6. Right hand pain  M79.641 729.5          Breanna York reports when I woke up yesterday I could hardly do anything with both hands for about 3 hours.  Then it got better.   R hand 2/10 still and with use 10/10 sharp shooting pains.        Objective   See Exercise, Manual, and Modality Logs for complete treatment.   Pt having symptoms in L hand now as well.  Pt having shooting pains and along B ulnar nerve path this date.      Assessment/Plan  Discussed and Recommend calling primary care for referral to have neurology consult due to symptoms being bilateral in nature along the same nerve path.  Pt wakes up with headaches/neck pain.  Pt is responding to nerve glides at the cervical spine, however still progressively losing function in B hands.      Cont per POC           Timed:  Manual Therapy:    0     mins  63222;  Therapeutic Exercise:    10     mins  08903;  Therapeutic Activity:    10     mins  61199;     Neuromuscular Julia:    10    mins  54121;    Ultrasound:     0     mins  39003;    Electrical Stimulation:    0     mins  19320;    Untimed:  Electrical Stimulation:    0     mins  13669 ( );  Fluidotherapy:        0    mins  14943  Paraffin:                          0    mins  95359    Timed Treatment:   30   mins   Total Treatment:     30   mins    OT SIGNATURE: JACKLYN Garcia, BELIA/L, CHT     Electronically signed    [place] LICENSE: [id number]

## 2022-09-14 ENCOUNTER — TELEPHONE (OUTPATIENT)
Dept: INTERNAL MEDICINE | Facility: CLINIC | Age: 82
End: 2022-09-14

## 2022-09-14 DIAGNOSIS — M25.531 PAIN IN BOTH WRISTS: Primary | ICD-10-CM

## 2022-09-14 DIAGNOSIS — M25.532 PAIN IN BOTH WRISTS: Primary | ICD-10-CM

## 2022-09-14 NOTE — TELEPHONE ENCOUNTER
Caller: Breanna York    Relationship: Self    Best call back number: [phone]    What is the medical concern/diagnosis: PAIN IN HANDS AND WRISTS    What specialty or service is being requested: NEUROLOGIST     What is the provider, practice or medical service name: UNKNOWN      Any additional details: RAMOS HAAS FROM PHYSICAL THERAPY ADVISED PATIENT SHE NEEDED TO SEE A NEUROLOGIST AND SHE WILL BE HAPPY TO PROVIDE ANY NECESSARY INFORMATION. PLEASE CONTACT PATIENT AND ADVISE WHEN REFERRAL HAS BEEN SENT

## 2022-09-14 NOTE — TELEPHONE ENCOUNTER
Spoke with patient and informed her of this.  Referral has been placed to Dr. Samano's office.  Patient is aware.

## 2022-09-14 NOTE — TELEPHONE ENCOUNTER
There was already a message in this patient's chart that I was working on that stated Dr Perera thought she should see Dr. Samano, Neurosurgeon, for this since it seems to stem for C6 & C7 per therapist on previous note from patient.  This referral has been changed to neurosurgeon, Dr. Samano.

## 2022-09-15 ENCOUNTER — TREATMENT (OUTPATIENT)
Dept: PHYSICAL THERAPY | Facility: CLINIC | Age: 82
End: 2022-09-15

## 2022-09-15 DIAGNOSIS — M79.641 RIGHT HAND PAIN: ICD-10-CM

## 2022-09-15 DIAGNOSIS — M25.641 DECREASED RANGE OF MOTION OF FINGER OF RIGHT HAND: ICD-10-CM

## 2022-09-15 DIAGNOSIS — M25.641 STIFFNESS OF FINGER JOINT OF RIGHT HAND: ICD-10-CM

## 2022-09-15 DIAGNOSIS — M65.9 TENOSYNOVITIS OF FINGER: ICD-10-CM

## 2022-09-15 DIAGNOSIS — M65.351 TRIGGER LITTLE FINGER OF RIGHT HAND: Primary | ICD-10-CM

## 2022-09-15 DIAGNOSIS — M79.644 PAIN OF FINGER OF RIGHT HAND: ICD-10-CM

## 2022-09-15 PROCEDURE — G0283 ELEC STIM OTHER THAN WOUND: HCPCS | Performed by: OCCUPATIONAL THERAPIST

## 2022-09-15 PROCEDURE — 97112 NEUROMUSCULAR REEDUCATION: CPT | Performed by: OCCUPATIONAL THERAPIST

## 2022-09-15 PROCEDURE — 97530 THERAPEUTIC ACTIVITIES: CPT | Performed by: OCCUPATIONAL THERAPIST

## 2022-09-15 NOTE — PROGRESS NOTES
Occupational Therapy Daily Treatment Note      Patient: Breanna York   : 1940  Referring practitioner: Ptaricio Carmona MD  Date of Initial Visit: Type: THERAPY  Noted: 2022  Today's Date: 9/15/2022  Patient seen for 6 sessions    ICD-10-CM ICD-9-CM   1. Trigger little finger of right hand  M65.351 727.03   2. Tenosynovitis of finger  M65.9 727.05   3. Stiffness of finger joint of right hand  M25.641 719.54   4. Pain of finger of right hand  M79.644 729.5   5. Decreased range of motion of finger of right hand  M25.641 719.54   6. Right hand pain  M79.641 729.5          Breanna York reports got a referral for neuro surgeon to see if it is coming from the neck.  My hand pain is 3/10 but now I am having pain in the back of my neck and headaches 7/10      Objective   See Exercise, Manual, and Modality Logs for complete treatment.   Symptoms are centralizing from R hand to neck.  Having popping in cervical spine with chin tucks    Assessment/Plan  Worked on postural thoracic and cervical alignment to help with proximal pain.  Pt had decreased overall pain following treatment.       Cont per POC           Timed:  Manual Therapy:    0     mins  43740;  Therapeutic Exercise:    10     mins  26998;  Therapeutic Activity:    10     mins  43022;     Neuromuscular Julia:    10    mins  99951;    Ultrasound:     0     mins  39421;    Electrical Stimulation:    0     mins  28769;    Untimed:  Electrical Stimulation:    10     mins  12141 ( );  Fluidotherapy:        0    mins  35741  Paraffin:                          0    mins  99672    Timed Treatment:   30   mins   Total Treatment:     40   mins    OT SIGNATURE: JACKLYN Garcia, OTR/L, CHT     Electronically signed    [place] LICENSE: [id number]

## 2022-09-20 ENCOUNTER — TREATMENT (OUTPATIENT)
Dept: PHYSICAL THERAPY | Facility: CLINIC | Age: 82
End: 2022-09-20

## 2022-09-20 DIAGNOSIS — M65.351 TRIGGER LITTLE FINGER OF RIGHT HAND: ICD-10-CM

## 2022-09-20 DIAGNOSIS — M79.641 RIGHT HAND PAIN: ICD-10-CM

## 2022-09-20 DIAGNOSIS — M25.641 STIFFNESS OF FINGER JOINT OF RIGHT HAND: ICD-10-CM

## 2022-09-20 DIAGNOSIS — M65.9 TENOSYNOVITIS OF FINGER: Primary | ICD-10-CM

## 2022-09-20 DIAGNOSIS — M79.644 PAIN OF FINGER OF RIGHT HAND: ICD-10-CM

## 2022-09-20 DIAGNOSIS — M25.641 DECREASED RANGE OF MOTION OF FINGER OF RIGHT HAND: ICD-10-CM

## 2022-09-20 PROCEDURE — 97110 THERAPEUTIC EXERCISES: CPT | Performed by: OCCUPATIONAL THERAPIST

## 2022-09-20 PROCEDURE — 97530 THERAPEUTIC ACTIVITIES: CPT | Performed by: OCCUPATIONAL THERAPIST

## 2022-09-20 PROCEDURE — 97112 NEUROMUSCULAR REEDUCATION: CPT | Performed by: OCCUPATIONAL THERAPIST

## 2022-09-20 NOTE — PROGRESS NOTES
Re-Assessment / Re-Certification      Patient: Breanna York   : 1940  Diagnosis/ICD-10 Code:  Tenosynovitis of finger [M65.9]  Referring practitioner: Patricio Carmona MD  Date of Initial Visit: Type: THERAPY  Noted: 2022  Today's Date: 2022  Patient seen for 7 sessions      Subjective:   Breanna York reports: I am feeling better but my lawnmower has been in the shop  Subjective Questionnaire: QuickDASH: 36  Clinical Progress: improved  Home Program Compliance: Yes  Treatment has included: therapeutic exercise, neuromuscular re-education, manual therapy and therapeutic activity    Objective   Sensation      Wrist/Hand      Right   Diminished: light touch     Comments   Right light touch: 2.83 all digits.     Active Range of Motion      Right Wrist   Wrist flexion: 60 degrees   Wrist extension: 60 degrees     Additional Active Range of Motion Details  0-95     - 0-65  0-95     0-105   15-65  0-90     0-105   0-50  0-95     0-75     0-30     Swelling   Right Wrist/Hand   Circumference MCP: 18 cm  Circumference wrist: 15 cm      #12    C5-T3 palpation tenderness and pain noted this date.  Has headaches in back lower head, or on the L side of head since Right hand pain began.  L neck rotation decreased compared to rotating over R side.        Assessment/Plan    Visit Diagnoses:    ICD-10-CM ICD-9-CM   1. Tenosynovitis of finger  M65.9 727.05   2. Stiffness of finger joint of right hand  M25.641 719.54   3. Pain of finger of right hand  M79.644 729.5   4. Decreased range of motion of finger of right hand  M25.641 719.54   5. Right hand pain  M79.641 729.5   6. Trigger little finger of right hand  M65.351 727.03       Progress toward previous goals: Partially Met    Plan Goals: 1. The patient complains of pain in the R wrist and hand                  LTG 1: 12 weeks:  The patient will report a pain rating of 0/10 or better in order to improve sleep quality and tolerance to performance of activities  of daily living.                                  STATUS:  NOT MET                  STG 1a: 4weeks:  The patient will report a pain rating of 6/10 or better.                                   STATUS:  MET  2. The patient has limited ROM of the R SF                  LTG 2: 12 weeks:  The patient will demonstrate 240 degrees of TOVAR to allow the patient to  mop.                                  STATUS:  NOT MET                  STG 2a: 4 weeks:  The patient will demonstrate 200 degrees of TOVAR.                                  STATUS:  MET                           3. The patient has limited strength of the R UE.                  LTG 3: 12 weeks:  The patient will demonstrate 40# in order to do yard work.                                  STATUS:  NOT MET                  STG 3a: 4 weeks:  The patient will demonstrate tolerance to light  strengthening.                                  STATUS:  NOT MET  4. Carrying, Moving, and Handling Objects Functional Limitation                                   LTG 4: 12 weeks:  The patient will demonstrate 0% limitation by achieving a score of 11 on the Quick DASH.                                  STATUS:  NOT MET                  STG 4a: 4 weeks:  The patient will demonstrate 40-59% limitation by achieving a score of 30 on the Quick DASH.                                    STATUS:  New      Recommendations: Continue as planned  Timeframe: 2 months  Prognosis to achieve goals: fair      OT SIGNATURE: JACKLYN Garcia, OTR/L, CHT     Electronically signed    [place] LICENSE: [id number]   DATE TREATMENT INITIATED: 9/20/2022        Timed:  Manual Therapy:    0     mins  90303;  Therapeutic Exercise:    10     mins  11992;  Therapeutic Activity:    10     mins  53809;     Neuromuscular Julia:    18    mins  79696;    Ultrasound:     0     mins  81931;    Electrical Stimulation:    0     mins  58253;    Untimed:  Electrical Stimulation:    0     mins  18763 ( );  Fluidotherapy:         00    mins  32363  Paraffin:                          0    mins  94724    Timed Treatment:   38   mins   Total Treatment:     38   mins

## 2022-10-04 ENCOUNTER — TELEPHONE (OUTPATIENT)
Dept: INTERNAL MEDICINE | Facility: CLINIC | Age: 82
End: 2022-10-04

## 2022-10-04 ENCOUNTER — TREATMENT (OUTPATIENT)
Dept: PHYSICAL THERAPY | Facility: CLINIC | Age: 82
End: 2022-10-04

## 2022-10-04 DIAGNOSIS — M79.644 PAIN OF FINGER OF RIGHT HAND: ICD-10-CM

## 2022-10-04 DIAGNOSIS — M79.641 RIGHT HAND PAIN: ICD-10-CM

## 2022-10-04 DIAGNOSIS — M25.641 DECREASED RANGE OF MOTION OF FINGER OF RIGHT HAND: ICD-10-CM

## 2022-10-04 DIAGNOSIS — M65.351 TRIGGER LITTLE FINGER OF RIGHT HAND: ICD-10-CM

## 2022-10-04 DIAGNOSIS — M65.9 TENOSYNOVITIS OF FINGER: Primary | ICD-10-CM

## 2022-10-04 DIAGNOSIS — M25.641 STIFFNESS OF FINGER JOINT OF RIGHT HAND: ICD-10-CM

## 2022-10-04 PROCEDURE — 97112 NEUROMUSCULAR REEDUCATION: CPT | Performed by: OCCUPATIONAL THERAPIST

## 2022-10-04 PROCEDURE — 97530 THERAPEUTIC ACTIVITIES: CPT | Performed by: OCCUPATIONAL THERAPIST

## 2022-10-04 NOTE — TELEPHONE ENCOUNTER
Agree, either add this to her current referral or please put in for a new referral.  This would be for eval and treat low back pain.

## 2022-10-04 NOTE — TELEPHONE ENCOUNTER
Caller: Breanna York    Relationship: Self    Best call back number: [phone]    What is the medical concern/diagnosis: PAIN IN HANDS COMING FROM SPINE     What specialty or service is being requested: PHYSICAL THERAPY    Any additional details: PATIENT WOULD LIKE TO SEE PHYSICAL THERAPY FOR HER 6TH AND 7TH DISKS IN HER SPINE. SHE HAS SEEN PHYSICAL THERAPY FOR HER HANDS AND THE PHYSICAL THERAPIST BELIEVES IT IS DUE TO HER SPINE.

## 2022-10-04 NOTE — PROGRESS NOTES
Occupational Therapy Daily Treatment Note      Patient: Breanna York   : 1940  Referring practitioner: Patricio Carmona MD  Date of Initial Visit: Type: THERAPY  Noted: 2022  Today's Date: 10/4/2022  Patient seen for 8 sessions    ICD-10-CM ICD-9-CM   1. Tenosynovitis of finger  M65.9 727.05   2. Stiffness of finger joint of right hand  M25.641 719.54   3. Pain of finger of right hand  M79.644 729.5   4. Decreased range of motion of finger of right hand  M25.641 719.54   5. Right hand pain  M79.641 729.5   6. Trigger little finger of right hand  M65.351 727.03          Breanna York reports I have not gotten a call back yet from Neuro and I am frustrated.  I don't think therapy is helping my hands at this point, both hands are hurting.        Objective   See Exercise, Manual, and Modality Logs for complete treatment.   Pt having stabbing, shooting pains in B hands, increased neck pain and stiffness.  Educated on nerve roots, cervical issues, pain, neurological symptoms.     Assessment/Plan  Recommended patient continue to advocate for Neurology appointment and call the office to schedule.  Also recommend PT services for neck.  Pt is going to purchase TENS unit, and will educate patient on use for pain relief when it comes in.  OT services placed on hold until TENS unit comes in.       Cont per POC           Timed:  Manual Therapy:    0     mins  51467;  Therapeutic Exercise:    0     mins  81105;  Therapeutic Activity:    15   mins  62694;     Neuromuscular Julia:    15    mins  67386;    Ultrasound:     0     mins  31384;    Electrical Stimulation:    0     mins  98345;    Untimed:  Electrical Stimulation:    0     mins  36135 ( );  Fluidotherapy:        0    mins  26167  Paraffin:                          0    mins  29360    Timed Treatment:   30   mins   Total Treatment:     30   mins    OT SIGNATURE: JACKLYN Garcia, OTR/L, CHT     Electronically signed    [place] LICENSE: [id number]

## 2022-10-14 ENCOUNTER — HOSPITAL ENCOUNTER (OUTPATIENT)
Dept: MAMMOGRAPHY | Facility: HOSPITAL | Age: 82
Discharge: HOME | End: 2022-10-14

## 2022-10-14 ENCOUNTER — HOSPITAL ENCOUNTER (OUTPATIENT)
Dept: BONE DENSITY | Facility: HOSPITAL | Age: 82
Discharge: HOME | End: 2022-10-14

## 2022-10-14 DIAGNOSIS — Z12.31 BREAST CANCER SCREENING BY MAMMOGRAM: ICD-10-CM

## 2022-10-14 DIAGNOSIS — M81.0 AGE-RELATED OSTEOPOROSIS WITHOUT CURRENT PATHOLOGICAL FRACTURE: ICD-10-CM

## 2022-10-14 PROCEDURE — 77063 BREAST TOMOSYNTHESIS BI: CPT

## 2022-10-14 PROCEDURE — 77067 SCR MAMMO BI INCL CAD: CPT

## 2022-10-14 PROCEDURE — 77080 DXA BONE DENSITY AXIAL: CPT

## 2022-10-18 ENCOUNTER — LAB (OUTPATIENT)
Dept: LAB | Facility: HOSPITAL | Age: 82
End: 2022-10-18

## 2022-10-18 DIAGNOSIS — E55.9 VITAMIN D DEFICIENCY: ICD-10-CM

## 2022-10-18 DIAGNOSIS — R53.83 OTHER FATIGUE: ICD-10-CM

## 2022-10-18 DIAGNOSIS — I10 ESSENTIAL HYPERTENSION: ICD-10-CM

## 2022-10-18 DIAGNOSIS — E78.2 MIXED HYPERLIPIDEMIA: ICD-10-CM

## 2022-10-18 LAB
25(OH)D3 SERPL-MCNC: 47.1 NG/ML (ref 30–100)
ALBUMIN SERPL-MCNC: 4.2 G/DL (ref 3.5–5.2)
ALBUMIN/GLOB SERPL: 1.7 G/DL
ALP SERPL-CCNC: 86 U/L (ref 39–117)
ALT SERPL W P-5'-P-CCNC: 14 U/L (ref 1–33)
ANION GAP SERPL CALCULATED.3IONS-SCNC: 9.1 MMOL/L (ref 5–15)
AST SERPL-CCNC: 27 U/L (ref 1–32)
BASOPHILS # BLD AUTO: 0.06 10*3/MM3 (ref 0–0.2)
BASOPHILS NFR BLD AUTO: 1.2 % (ref 0–1.5)
BILIRUB SERPL-MCNC: 0.2 MG/DL (ref 0–1.2)
BUN SERPL-MCNC: 14 MG/DL (ref 8–23)
BUN/CREAT SERPL: 15.6 (ref 7–25)
CALCIUM SPEC-SCNC: 9.4 MG/DL (ref 8.6–10.5)
CHLORIDE SERPL-SCNC: 104 MMOL/L (ref 98–107)
CHOLEST SERPL-MCNC: 246 MG/DL (ref 0–200)
CO2 SERPL-SCNC: 25.9 MMOL/L (ref 22–29)
CREAT SERPL-MCNC: 0.9 MG/DL (ref 0.57–1)
DEPRECATED RDW RBC AUTO: 39.9 FL (ref 37–54)
EGFRCR SERPLBLD CKD-EPI 2021: 64 ML/MIN/1.73
EOSINOPHIL # BLD AUTO: 0.15 10*3/MM3 (ref 0–0.4)
EOSINOPHIL NFR BLD AUTO: 3.1 % (ref 0.3–6.2)
ERYTHROCYTE [DISTWIDTH] IN BLOOD BY AUTOMATED COUNT: 12.5 % (ref 12.3–15.4)
GLOBULIN UR ELPH-MCNC: 2.5 GM/DL
GLUCOSE SERPL-MCNC: 92 MG/DL (ref 65–99)
HCT VFR BLD AUTO: 34.1 % (ref 34–46.6)
HDLC SERPL-MCNC: 72 MG/DL (ref 40–60)
HGB BLD-MCNC: 11.6 G/DL (ref 12–15.9)
IMM GRANULOCYTES # BLD AUTO: 0.01 10*3/MM3 (ref 0–0.05)
IMM GRANULOCYTES NFR BLD AUTO: 0.2 % (ref 0–0.5)
LDLC SERPL CALC-MCNC: 158 MG/DL (ref 0–100)
LDLC/HDLC SERPL: 2.15 {RATIO}
LYMPHOCYTES # BLD AUTO: 1.69 10*3/MM3 (ref 0.7–3.1)
LYMPHOCYTES NFR BLD AUTO: 34.9 % (ref 19.6–45.3)
MCH RBC QN AUTO: 29.9 PG (ref 26.6–33)
MCHC RBC AUTO-ENTMCNC: 34 G/DL (ref 31.5–35.7)
MCV RBC AUTO: 87.9 FL (ref 79–97)
MONOCYTES # BLD AUTO: 0.51 10*3/MM3 (ref 0.1–0.9)
MONOCYTES NFR BLD AUTO: 10.5 % (ref 5–12)
NEUTROPHILS NFR BLD AUTO: 2.42 10*3/MM3 (ref 1.7–7)
NEUTROPHILS NFR BLD AUTO: 50.1 % (ref 42.7–76)
NRBC BLD AUTO-RTO: 0 /100 WBC (ref 0–0.2)
PLATELET # BLD AUTO: 260 10*3/MM3 (ref 140–450)
PMV BLD AUTO: 9.8 FL (ref 6–12)
POTASSIUM SERPL-SCNC: 4.1 MMOL/L (ref 3.5–5.2)
PROT SERPL-MCNC: 6.7 G/DL (ref 6–8.5)
RBC # BLD AUTO: 3.88 10*6/MM3 (ref 3.77–5.28)
SODIUM SERPL-SCNC: 139 MMOL/L (ref 136–145)
TRIGL SERPL-MCNC: 95 MG/DL (ref 0–150)
VLDLC SERPL-MCNC: 16 MG/DL (ref 5–40)
WBC NRBC COR # BLD: 4.84 10*3/MM3 (ref 3.4–10.8)

## 2022-10-18 PROCEDURE — 80061 LIPID PANEL: CPT

## 2022-10-18 PROCEDURE — 36415 COLL VENOUS BLD VENIPUNCTURE: CPT

## 2022-10-18 PROCEDURE — 82306 VITAMIN D 25 HYDROXY: CPT

## 2022-10-18 PROCEDURE — 80053 COMPREHEN METABOLIC PANEL: CPT

## 2022-10-18 PROCEDURE — 85025 COMPLETE CBC W/AUTO DIFF WBC: CPT

## 2022-10-18 NOTE — TELEPHONE ENCOUNTER
This message was not put in correctly, Pt is needing to see Neurosurgery for her spine, therapy feels that the pain she is having in her hands is coming from C6 and C7. Pt sees Dr. Perera on Thursday, we will talk to him again about this.

## 2022-10-20 ENCOUNTER — OFFICE VISIT (OUTPATIENT)
Dept: ORTHOPEDIC SURGERY | Facility: CLINIC | Age: 82
End: 2022-10-20

## 2022-10-20 ENCOUNTER — OFFICE VISIT (OUTPATIENT)
Dept: INTERNAL MEDICINE | Facility: CLINIC | Age: 82
End: 2022-10-20

## 2022-10-20 VITALS
HEART RATE: 86 BPM | OXYGEN SATURATION: 98 % | BODY MASS INDEX: 24.37 KG/M2 | SYSTOLIC BLOOD PRESSURE: 150 MMHG | DIASTOLIC BLOOD PRESSURE: 90 MMHG | TEMPERATURE: 97.2 F | HEIGHT: 62 IN | WEIGHT: 132.4 LBS

## 2022-10-20 VITALS — WEIGHT: 133 LBS | HEART RATE: 68 BPM | OXYGEN SATURATION: 99 % | HEIGHT: 62 IN | BODY MASS INDEX: 24.48 KG/M2

## 2022-10-20 DIAGNOSIS — E55.9 VITAMIN D DEFICIENCY: ICD-10-CM

## 2022-10-20 DIAGNOSIS — M54.12 CERVICAL RADICULOPATHY: ICD-10-CM

## 2022-10-20 DIAGNOSIS — Z23 NEED FOR VACCINATION: ICD-10-CM

## 2022-10-20 DIAGNOSIS — E78.2 MIXED HYPERLIPIDEMIA: ICD-10-CM

## 2022-10-20 DIAGNOSIS — M17.0 PRIMARY OSTEOARTHRITIS OF KNEES, BILATERAL: Primary | ICD-10-CM

## 2022-10-20 DIAGNOSIS — M81.0 AGE-RELATED OSTEOPOROSIS WITHOUT CURRENT PATHOLOGICAL FRACTURE: ICD-10-CM

## 2022-10-20 DIAGNOSIS — I10 ESSENTIAL HYPERTENSION: Primary | ICD-10-CM

## 2022-10-20 DIAGNOSIS — M19.91 PRIMARY LOCALIZED OSTEOARTHRITIS: ICD-10-CM

## 2022-10-20 PROCEDURE — 90662 IIV NO PRSV INCREASED AG IM: CPT | Performed by: INTERNAL MEDICINE

## 2022-10-20 PROCEDURE — G0008 ADMIN INFLUENZA VIRUS VAC: HCPCS | Performed by: INTERNAL MEDICINE

## 2022-10-20 PROCEDURE — 99214 OFFICE O/P EST MOD 30 MIN: CPT | Performed by: INTERNAL MEDICINE

## 2022-10-20 PROCEDURE — 20610 DRAIN/INJ JOINT/BURSA W/O US: CPT | Performed by: ORTHOPAEDIC SURGERY

## 2022-10-20 RX ORDER — LIDOCAINE HYDROCHLORIDE 10 MG/ML
5 INJECTION, SOLUTION INFILTRATION; PERINEURAL
Status: COMPLETED | OUTPATIENT
Start: 2022-10-20 | End: 2022-10-20

## 2022-10-20 RX ORDER — HYDROCODONE BITARTRATE AND ACETAMINOPHEN 5; 325 MG/1; MG/1
1 TABLET ORAL EVERY 12 HOURS PRN
Qty: 40 TABLET | Refills: 0 | Status: SHIPPED | OUTPATIENT
Start: 2022-10-20

## 2022-10-20 RX ADMIN — LIDOCAINE HYDROCHLORIDE 5 ML: 10 INJECTION, SOLUTION INFILTRATION; PERINEURAL at 10:15

## 2022-10-20 RX ADMIN — LIDOCAINE HYDROCHLORIDE 5 ML: 10 INJECTION, SOLUTION INFILTRATION; PERINEURAL at 10:16

## 2022-10-20 NOTE — ASSESSMENT & PLAN NOTE
BMD was reviewed at her 10/22 office visit.  Her bone density is down about 5% at the hip over the past 2 years.  Not sure what the difference in the spine BMD is related to.  Patient has completed 5 doses of Reclast since she was intolerant to oral bisphosphonate.  Need to see about getting her in with Dr. King next year in regards to possible other treatments for the osteoporosis.

## 2022-10-20 NOTE — PROGRESS NOTES
"Chief Complaint  Hypertension (Pt's BP is 150/90, but at 10:00am she has bilateral knee injecitons. /She would like to talk about her Voltaren, she would like 50mg tablets and she would like to talk about getting Hydrocodone as well. ), Follow-up (Pt states that this is routine and she had labs. ), and Pain (Bilateral wrist pain, therapy feels that her pain is coming from her C6-C7, Dr. Samano will see her but not without an MRI of neck along with an EMG and then we will have to put in a new referral with a different diagnosis. )    Subjective          Breanna York presents to [facility]     History of Present Illness  Pleasant 83 yo female with HTN, HYPERLIPIDEMIA, DJD, among others, coming in 10/22 for routine 6-month f/u.  We will go over her med list, review her labs in detail, and address any new concerns she may have.    Review of Systems   Constitutional: Negative for appetite change, fatigue and fever.   HENT: Negative for congestion and ear pain.    Eyes: Negative for blurred vision.   Respiratory: Negative for cough, chest tightness, shortness of breath and wheezing.    Cardiovascular: Negative for chest pain, palpitations and leg swelling.   Gastrointestinal: Negative for abdominal pain.   Genitourinary: Negative for difficulty urinating, dysuria and hematuria.   Musculoskeletal: Negative for arthralgias and gait problem.   Skin: Negative for skin lesions.   Neurological: Negative for syncope, memory problem and confusion.   Psychiatric/Behavioral: Negative for self-injury and depressed mood.       Objective   Vital Signs:   /90 (BP Location: Left arm, Patient Position: Sitting, Cuff Size: Adult)   Pulse 86   Temp 97.2 °F (36.2 °C) (Skin)   Ht 157.5 cm (62.01\")   Wt 60.1 kg (132 lb 6.4 oz)   SpO2 98%   BMI 24.21 kg/m²           Physical Exam  Vitals and nursing note reviewed.   Constitutional:       General: She is not in acute distress.     Appearance: " Normal appearance. She is not toxic-appearing.   HENT:      Head: Atraumatic.      Right Ear: External ear normal.      Left Ear: External ear normal.      Nose: Nose normal.      Mouth/Throat:      Mouth: Mucous membranes are moist.   Eyes:      General:         Right eye: No discharge.         Left eye: No discharge.      Extraocular Movements: Extraocular movements intact.      Pupils: Pupils are equal, round, and reactive to light.   Cardiovascular:      Rate and Rhythm: Normal rate and regular rhythm.      Pulses: Normal pulses.      Heart sounds: Normal heart sounds. No murmur heard.    No gallop.   Pulmonary:      Effort: Pulmonary effort is normal. No respiratory distress.      Breath sounds: No wheezing, rhonchi or rales.   Abdominal:      General: There is no distension.      Palpations: Abdomen is soft. There is no mass.      Tenderness: There is no abdominal tenderness. There is no guarding.   Musculoskeletal:         General: No swelling or tenderness.      Cervical back: No tenderness.      Right lower leg: No edema.      Left lower leg: No edema.   Skin:     General: Skin is warm and dry.      Findings: No rash.   Neurological:      General: No focal deficit present.      Mental Status: She is alert and oriented to person, place, and time. Mental status is at baseline.      Motor: No weakness.      Gait: Gait normal.   Psychiatric:         Mood and Affect: Mood normal.         Thought Content: Thought content normal.          Result Review :   The following data was reviewed by: Topher Perera MD on 10/21/2021:                  Assessment and Plan    Diagnoses and all orders for this visit:    1. Essential hypertension (Primary)  -     Comprehensive Metabolic Panel; Future    2. Mixed hyperlipidemia  Assessment & Plan:  LDL is right back up to 160 as of 10/22.  She is not interested in statin therapy at this time, she is having a lot of pains, do not need to muddy the proctor.  She will pay more  attention to her diet, will keep an eye on this.    Orders:  -     Lipid Panel; Future    3. Age-related osteoporosis without current pathological fracture  Overview:  BMD 1/18...spine -1.0, hip -2.4  BMD 10/20 = spine -1.2, hip -2.3    BMD 10/22:  Spine -2.8  Hip -2.5        Assessment & Plan:  BMD was reviewed at her 10/22 office visit.  Her bone density is down about 5% at the hip over the past 2 years.  Not sure what the difference in the spine BMD is related to.  Patient has completed 5 doses of Reclast since she was intolerant to oral bisphosphonate.  Need to see about getting her in with Dr. King next year in regards to possible other treatments for the osteoporosis.    Orders:  -     Ambulatory Referral to Rheumatology    4. Need for vaccination  -     Fluzone High-Dose 65+yrs ([id number])    5. Primary localized osteoarthritis  Assessment & Plan:  Patient saw Dr. Vo for bilateral knee injections earlier today as of her 10/22 office visit.  She uses Voltaren as needed for flareups, refill given this office visit as well.  Additionally, patient needs some hydrocodone on hand, needs to avoid the nonsteroidals at time due to GI upset etc.    Orders:  -     HYDROcodone-acetaminophen (Norco) 5-325 MG per tablet; Take 1 tablet by mouth Every 12 (Twelve) Hours As Needed for Moderate Pain.  Dispense: 40 tablet; Refill: 0    6. Cervical radiculopathy  Assessment & Plan:  Pt with progressive issues of B hand weakness and negative MRI's of hands/wrists per [facility].  We put orders in for cervical MRI along with the eval per neuro for EMG/NCS.    Orders:  -     Ambulatory Referral to Neurology  -     MRI Cervical Spine Without Contrast; Future    7. Vitamin D deficiency  Assessment & Plan:  Vit D is 47 as of 10/22 so she is stable to continue low-dose over-the-counter supplementation    Orders:  -     Vitamin D,25-Hydroxy; Future    Other orders  -     diclofenac (VOLTAREN) 50 MG EC tablet; Take 1 tablet by mouth 2 (Two)  Times a Day As Needed (Joint pain).  Dispense: 90 tablet; Refill: 1     --  --  OLDER NOTES:  URGENT VISIT 10/20:  L NECK PAIN just like 12/16 note below; I d/w C-spine series and take aleve 1 tab bid---> signif DJD as expected; did not have to stay on aleve; no radiation;   TACHYCARDIA = HR up 70 to 90+; has CBC/TSH conrad, so will review them when done and eval further on RTO; no CP...TSH neg, CBC with Hgb 11.2; since resolved; was related to Covid infection she said.  COVID-19 + on Labor Day.  --  VISIT 4/21 = above/below.  ANNUAL MEDICARE WELLNESS 4/21 = d/w all forms in office; no new discoveries; Narciso neg.  --  MILD ANEMIA as of 10/20 OV=11.2, down 1 gram past year; no bleeding; f/u on RTO---> Hgb 12 with nl iron as of 4/21, so defer f/u.  --  HTN remains controlled off ACEI as of 7/18 OV; bring machine in=150's at home (stopped ACEI due to severe angiodedema following bug bites); will use norvasc 2.5 if BP trends back up...start now 1/19...better 4/19---> holding 10/19 on these=ditto 4/21.  ?CKD3 = 53% in 4/20 = watch on RTO---> fine 10/20 and same 4/21.  --  LIPIDS at goal '12 w/o tx...ditto 8/15 with HDL 86..., but no tx unless event...141...121 with HDL 95, so defer tx 10/19---> , so I d/w get it back in the 120's please.  --  C/O R HIP PAIN...exam with excellent ROM, neg SLR, point tenderness c/w bursitis...try meds for now and call...better and then returned, no trauma, intermittent, worse up stairs, but able to mow/trim 5 acres, PTA for eval/HEP...worse after PT, s/p injection per ortho in [place] and prn mobic... HIP now, saw ortho in [place], story sounds radicular, but exam more c/w bursitis, so try medrol and keep using heat; also told me ortho was going to conrad MRI and I rec gabapentin again...MRI hip=bursitis and spine with DJD; things are better and only tolerating low dose gabapentin...stable off=ran out and no worse.  R KNEE PAIN=prior scopes; sx's flaring 1/18, but not severe  yet, so no new tx rec...get steroid shots per Dr Vo prn---> L knee issues as of 4/21 and is in PT.  S/P R r TSR on 4/17/19 per Dr Vo...has completed rehab as of 10/19 OV; no pain meds needed.  --  SHINGLES rash is drying up, no secondary infection, but is very sensitive; c/w meds and will push dose...she's weaning dose, but still with sxs 6/17.  L NECK PAIN needing to take ASA daily now; ? CINDY vs carotid calcification, no bruit; needs CT and ? dopplers/etc; please call...it was neg.  BALANCE ISSUES past few months, just has to occ grab wall=needs HCT as well...it was neg  --  HYPERCALCEMIA S/P RESECTION 9/15... PTH 40...PTH 73 and is trending up, but Ca++ 8.8 is stable 1/18...PTH 48...40 in 10/19 and will stop checking.    OSTEOPOROSIS...BMD 1/18...spine -1.0, hip -2.4...intol to actonel so on Reclast #5/5 due 1/19---> BMD 10/20 = spine -1.2, hip -2.3 = stable.  VIT D DEF stable (on 2K qd)...55 in 1/19---> 58 in 10/20.  --  GERD/ABD PAIN...to ER 11/13 with CT=mild prominence of CBD and pancreatic duct, labs neg...no melena, no blood...sx's better with 2x PPI...rare sxs 1/18 on no meds, so call if recurrent and would use H2 first line...dysphagia as of 1/19 OV, to water at times, so needs MBSS to start...non-issue.  --  DEPRESSION/INSOMNIA with 's passing...try trazodone first, SSRI if no benefit or intol...with prn ambien...better with PRN restoril.   --  PETICHIAL LESIONS 8/19 on legs=she showed me pictures at 10/19 OV; said it was non-blanchable, no itch, + stinging though; lasted 3-4 days, started in one spot on LLE and spread; I d/w call when recurs and will get labs.  FACIAL SWELLING 4/18 =nasal and bilateral inner eyelids/etc; top of scalp with scabbed area, but no tick/etc; associated erythema; lungs clear, no dysphagia; will treat with abx as well as prednisone and take benadryl as well... recurred 2 weeks later, tried ice/benaryl, to ER, given keflex/bactrim/steroid shot; no dysphagia nor  breathing issues with either episode; did have mosquito bite; pics reviewed; might as well finish out abx; rec no ACEI and zyrtec 10 mg bid for now; wear bug sprays if going out.  --  --  MMG 11/22/19 per me and is pending as of 4/21 OV.  EGD 7/12...erosive gastritis.  COLON 7/12...normal...defer.   REFUSES flu/pneumo; Shingrix x2; (Pt had Covid-19 in 9/20); rec COVID vac 4/21.  ( '12...after 57 yrs, 4 boys with 1 here and 2 in [place], 1 in ---> had 47 for holidays q year for both holidays=has 22 great-grand already).    Follow Up   Return in about 6 months (around 4/24/2023).  Patient was given instructions and counseling regarding her condition or for health maintenance advice. Please see specific information pulled into the AVS if appropriate.       Answers for HPI/ROS submitted by the patient on 10/16/2022  Please describe your symptoms.: Follow up on test. Pain in knees. Losing usefulness of hand  Have you had these symptoms before?: Yes  How long have you been having these symptoms?: Greater than 2 weeks  Please list any medications you are currently taking for this condition.: Gabapenten, norvas, vit. D, biotin,  What is the primary reason for your visit?: Other

## 2022-10-20 NOTE — ASSESSMENT & PLAN NOTE
Patient saw Dr. Vo for bilateral knee injections earlier today as of her 10/22 office visit.  She uses Voltaren as needed for flareups, refill given this office visit as well.  Additionally, patient needs some hydrocodone on hand, needs to avoid the nonsteroidals at time due to GI upset etc.

## 2022-10-20 NOTE — ASSESSMENT & PLAN NOTE
LDL is right back up to 160 as of 10/22.  She is not interested in statin therapy at this time, she is having a lot of pains, do not need to muddy the proctor.  She will pay more attention to her diet, will keep an eye on this.

## 2022-10-20 NOTE — PROGRESS NOTES
"Chief Complaint  Follow-up and Pain of the Right Knee and Pain and Follow-up of the Left Knee     Subjective      Breanna York presents to [facility] for a follow-up of bilateral knee pain. Patient has a history of bilateral knee osteoarthritis. She has been treating her knees conservatively. She was last seen on 7/14/22, this visit she had Zilretta injections. She reports increased pain in bilateral knees. Her right knee is worse than the left, according to the patient. She denies any injuries or trauma. Patient wishes to discuss gel versus steroid.     Allergies   Allergen Reactions   • Meperidine Unknown - High Severity   • Latex Rash        Social History     Socioeconomic History   • Marital status:    Tobacco Use   • Smoking status: Never   • Smokeless tobacco: Never   Vaping Use   • Vaping Use: Never used   Substance and Sexual Activity   • Alcohol use: Never   • Drug use: Never   • Sexual activity: Defer        Review of Systems     Objective   Vital Signs:   Pulse 68   Ht 157.5 cm (62\")   Wt 60.3 kg (133 lb)   SpO2 99%   BMI 24.33 kg/m²       Physical Exam  Constitutional:       Appearance: Normal appearance. Patient is well-developed and normal weight.   HENT:      Head: Normocephalic.      Right Ear: Hearing and external ear normal.      Left Ear: Hearing and external ear normal.      Nose: Nose normal.   Eyes:      Conjunctiva/sclera: Conjunctivae normal.   Cardiovascular:      Rate and Rhythm: Normal rate.   Pulmonary:      Effort: Pulmonary effort is normal.      Breath sounds: No wheezing or rales.   Abdominal:      Palpations: Abdomen is soft.      Tenderness: There is no abdominal tenderness.   Musculoskeletal:      Cervical back: Normal range of motion.   Skin:     Findings: No rash.   Neurological:      Mental Status: Patient is alert and oriented to person, place, and time.   Psychiatric:         Mood and Affect: Mood and affect normal.         Judgment: " Judgment normal.       Ortho Exam      BILATERAL KNEES: Sensation grossly intact. Neurovascular intact.  Good strength to hamstrings, quadriceps, dorsiflexors and plantar flexors. Crepitus with motion. Full extension bilaterally. Flexion to 120 degrees bilaterally. Calf soft. Dorsal Pedal Pulse 2+, posterior tibialis pulse 2+. No swelling, skin discoloration or atrophy. Right knee tender to palpation. Patient with slow tender gait.       Large Joint Arthrocentesis  Date/Time: 10/20/2022 10:15 AM  Consent given by: patient  Site marked: site marked  Timeout: Immediately prior to procedure a time out was called to verify the correct patient, procedure, equipment, support staff and site/side marked as required   Supporting Documentation  Indications: pain   Procedure Details  Location: RIGHT KNEE.  Needle gauge: 21G.  Medications administered: 32 mg Triamcinolone Acetonide 32 MG; 5 mL lidocaine 1 %  Patient tolerance: patient tolerated the procedure well with no immediate complications    Large Joint Arthrocentesis  Date/Time: 10/20/2022 10:16 AM  Consent given by: patient  Site marked: site marked  Timeout: Immediately prior to procedure a time out was called to verify the correct patient, procedure, equipment, support staff and site/side marked as required   Supporting Documentation  Indications: pain   Procedure Details  Location: LEFT KNEE.  Needle gauge: 21G.  Medications administered: 32 mg Triamcinolone Acetonide 32 MG; 5 mL lidocaine 1 %  Patient tolerance: patient tolerated the procedure well with no immediate complications            Imaging Results (Most Recent)     None           Result Review :     Assessment and Plan     Diagnoses and all orders for this visit:    1. Primary osteoarthritis of knees, bilateral (Primary)        Discussed previous injection and helped for 3 months.  Patient has moderate to severe arthritis.  Discussed knee replacement right knee vs injections.  Patient wants to continue to  proceed with injections at this time.  Difference between gel and Zilretta.  Patient wants to continue with current POC.     Zilretta injection administered bilaterally, she tolerated this well.     Call or return if worsening symptoms.    Follow Up     PRN      Patient was given instructions and counseling regarding her condition or for health maintenance advice. Please see specific information pulled into the AVS if appropriate.     Scribed for Candice Vo MD by Marija Ordaz.  10/20/22   09:32 EDT    I have personally performed the services described in this document as scribed by the above individual and it is both accurate and complete. Candice Vo MD 10/20/22

## 2022-10-20 NOTE — ASSESSMENT & PLAN NOTE
Pt with progressive issues of B hand weakness and negative MRI's of hands/wrists per [facility].  We put orders in for cervical MRI along with the eval per neuro for EMG/NCS.

## 2022-11-11 ENCOUNTER — HOSPITAL ENCOUNTER (OUTPATIENT)
Dept: MRI IMAGING | Facility: HOSPITAL | Age: 82
Discharge: HOME | End: 2022-11-11
Admitting: INTERNAL MEDICINE

## 2022-11-11 DIAGNOSIS — M54.12 CERVICAL RADICULOPATHY: ICD-10-CM

## 2022-11-11 PROCEDURE — 72141 MRI NECK SPINE W/O DYE: CPT

## 2022-12-13 DIAGNOSIS — F19.20 CHRONIC PAIN WITH DRUG DEPENDENCE: ICD-10-CM

## 2022-12-13 DIAGNOSIS — G89.29 CHRONIC PAIN WITH DRUG DEPENDENCE: ICD-10-CM

## 2022-12-13 RX ORDER — GABAPENTIN 100 MG/1
CAPSULE ORAL
Qty: 180 CAPSULE | Refills: 1 | Status: SHIPPED | OUTPATIENT
Start: 2022-12-13

## 2022-12-16 RX ORDER — AMLODIPINE BESYLATE 5 MG/1
5 TABLET ORAL DAILY
Qty: 90 TABLET | Refills: 1 | Status: SHIPPED | OUTPATIENT
Start: 2022-12-16

## 2022-12-28 ENCOUNTER — DOCUMENTATION (OUTPATIENT)
Dept: PHYSICAL THERAPY | Facility: CLINIC | Age: 82
End: 2022-12-28

## 2022-12-28 DIAGNOSIS — M65.9 TENOSYNOVITIS OF FINGER: Primary | ICD-10-CM

## 2022-12-28 DIAGNOSIS — M79.644 PAIN OF FINGER OF RIGHT HAND: ICD-10-CM

## 2022-12-28 DIAGNOSIS — M25.641 DECREASED RANGE OF MOTION OF FINGER OF RIGHT HAND: ICD-10-CM

## 2022-12-28 DIAGNOSIS — M25.641 STIFFNESS OF FINGER JOINT OF RIGHT HAND: ICD-10-CM

## 2022-12-28 DIAGNOSIS — M79.641 RIGHT HAND PAIN: ICD-10-CM

## 2022-12-28 DIAGNOSIS — M65.351 TRIGGER LITTLE FINGER OF RIGHT HAND: ICD-10-CM

## 2022-12-28 NOTE — PROGRESS NOTES
Discharge Summary  Discharge Summary from Occupational Therapy   [address] [place] [id number]    Patient Information  Breanna York  1940    Dates OT visit: 8/25/22-10/4/22  Number of Visits: 8     Discharge Status of Patient: See MD Note dated 10/4/22    Goals: Partially Met    Visit Diagnoses:    ICD-10-CM ICD-9-CM   1. Tenosynovitis of finger  M65.9 727.05   2. Stiffness of finger joint of right hand  M25.641 719.54   3. Pain of finger of right hand  M79.644 729.5   4. Decreased range of motion of finger of right hand  M25.641 719.54   5. Right hand pain  M79.641 729.5   6. Trigger little finger of right hand  M65.351 727.03       Discharge Plan: Continue with current home exercise program as instructed    Comments patient wanted to be on hold and see neuro before continuing    Date of Discharge 10/4/22        Darlene Lennon, OTD, OTR/L, CHT  Occupational Therapist, Certified Hand therapist    Electronically Signed   [place] LICENSE: [id number]

## 2023-01-25 ENCOUNTER — TELEPHONE (OUTPATIENT)
Dept: ORTHOPEDIC SURGERY | Facility: CLINIC | Age: 83
End: 2023-01-25
Payer: MEDICARE

## 2023-01-25 NOTE — TELEPHONE ENCOUNTER
Caller: MITZI BRANDON    Relationship to patient: SELF    Best call back number: [phone]    Chief complaint: RIGHT KNEE PAIN    Type of visit: CORTISONE INJECTION    Requested date: ASAP     If rescheduling, when is the original appointment: N/A     Additional notes: LAST SEEN OCT 2022

## 2023-02-07 ENCOUNTER — OFFICE VISIT (OUTPATIENT)
Dept: ORTHOPEDIC SURGERY | Facility: CLINIC | Age: 83
End: 2023-02-07
Payer: MEDICARE

## 2023-02-07 VITALS — HEIGHT: 62 IN | HEART RATE: 74 BPM | BODY MASS INDEX: 24.29 KG/M2 | WEIGHT: 132 LBS | OXYGEN SATURATION: 97 %

## 2023-02-07 DIAGNOSIS — M17.11 OSTEOARTHRITIS OF RIGHT KNEE, UNSPECIFIED OSTEOARTHRITIS TYPE: Primary | ICD-10-CM

## 2023-02-07 PROCEDURE — 20610 DRAIN/INJ JOINT/BURSA W/O US: CPT | Performed by: ORTHOPAEDIC SURGERY

## 2023-02-07 NOTE — PROGRESS NOTES
"Chief Complaint  Follow-up of the Right Knee     Subjective      Breanna York presents to [facility] for follow up of the right knee. She has a history of osteo arthritis in the right knee. She has been treating her knee conservatively.  She has had Zilretta injections in the past that has helped.  She has had no new injury.     Allergies   Allergen Reactions   • Latex Rash   • Meperidine Unknown - High Severity        Social History     Socioeconomic History   • Marital status:    Tobacco Use   • Smoking status: Never   • Smokeless tobacco: Never   Vaping Use   • Vaping Use: Never used   Substance and Sexual Activity   • Alcohol use: Never   • Drug use: Never   • Sexual activity: Defer        Review of Systems     Objective   Vital Signs:   Pulse 74   Ht 157.5 cm (62.01\")   Wt 59.9 kg (132 lb)   SpO2 97%   BMI 24.14 kg/m²       Physical Exam  Constitutional:       Appearance: Normal appearance. Patient is well-developed and normal weight.   HENT:      Head: Normocephalic.      Right Ear: Hearing and external ear normal.      Left Ear: Hearing and external ear normal.      Nose: Nose normal.   Eyes:      Conjunctiva/sclera: Conjunctivae normal.   Cardiovascular:      Rate and Rhythm: Normal rate.   Pulmonary:      Effort: Pulmonary effort is normal.      Breath sounds: No wheezing or rales.   Abdominal:      Palpations: Abdomen is soft.      Tenderness: There is no abdominal tenderness.   Musculoskeletal:      Cervical back: Normal range of motion.   Skin:     Findings: No rash.   Neurological:      Mental Status: Patient is alert and oriented to person, place, and time.   Psychiatric:         Mood and Affect: Mood and affect normal.         Judgment: Judgment normal.       Ortho Exam      RIGHT KNEE Flexion 125. Extension 0. Stable to varus/valgus stress. Stable to anterior/posterior drawer. Neurovascularly intact. Negative Talisha. Negative Lachman. Positive EHL, FHL, HS " and TA. Sensation intact to light touch all 5 nerves of the foot. Ambulates with Non-antalgic gait. Patella is well tracking. Calf supple, non-tender. Positive tenderness to the medial joint line. Positive tenderness to the lateral joint line. Positive Crepitus. Good strength to hamstrings, quadriceps, dorsiflexors, and plantar flexors.  Knee Extensor Mechanism intact        Large Joint Arthrocentesis: R knee  Date/Time: 2/7/2023 2:09 PM  Consent given by: patient  Site marked: site marked  Timeout: Immediately prior to procedure a time out was called to verify the correct patient, procedure, equipment, support staff and site/side marked as required   Supporting Documentation  Indications: pain   Procedure Details  Location: knee - R knee  Needle gauge: 21g.  Medications administered: 32 mg Triamcinolone Acetonide 32 MG  Patient tolerance: patient tolerated the procedure well with no immediate complications            Imaging Results (Most Recent)     None           Result Review :           Assessment and Plan     Diagnoses and all orders for this visit:    1. Osteoarthritis of right knee, unspecified osteoarthritis type (Primary)        Discussed the treatment plan with the patient. Discussed the treatment options with the patient, operative vs non-operative. The patient expressed understanding and wished to proceed with a right knee Zilretta injection. She tolerated the injection well.     Call or return if worsening symptoms.    Follow Up     PRN      Patient was given instructions and counseling regarding her condition or for health maintenance advice. Please see specific information pulled into the AVS if appropriate.     Scribed for Candice Vo MD by Elidia Pineda MA.  02/07/23   13:15 EST    I have personally performed the services described in this document as scribed by the above individual and it is both accurate and complete. Candice Vo MD 02/07/23

## 2023-02-24 ENCOUNTER — PROCEDURE VISIT (OUTPATIENT)
Dept: NEUROLOGY | Facility: CLINIC | Age: 83
End: 2023-02-24
Payer: MEDICARE

## 2023-02-24 VITALS
SYSTOLIC BLOOD PRESSURE: 161 MMHG | BODY MASS INDEX: 23.92 KG/M2 | WEIGHT: 130 LBS | HEART RATE: 84 BPM | DIASTOLIC BLOOD PRESSURE: 82 MMHG | HEIGHT: 62 IN

## 2023-02-24 DIAGNOSIS — G56.03 BILATERAL CARPAL TUNNEL SYNDROME: Primary | ICD-10-CM

## 2023-02-24 PROCEDURE — 99203 OFFICE O/P NEW LOW 30 MIN: CPT | Performed by: PSYCHIATRY & NEUROLOGY

## 2023-02-24 PROCEDURE — 95910 NRV CNDJ TEST 7-8 STUDIES: CPT | Performed by: PSYCHIATRY & NEUROLOGY

## 2023-02-24 NOTE — ASSESSMENT & PLAN NOTE
Nerve conduction study is abnormal and shows electrophysiologic evidence for moderate bilateral carpal tunnel syndrome.  Clinically does not explain her hand pain.  Her hand pain is secondary to arthritis and tendinitis.  I discussed with her that she can follow-up with orthopedic surgery to discuss regarding steroid injections to her left wrist for her numbness and tingling however it will not improve her hand and wrist pain.

## 2023-02-24 NOTE — PROGRESS NOTES
"Chief Complaint  Numbness (NUMBNESS & TINGLING IN BUE, L>R )    Subjective          Breanna York is a 82 y.o. female who presents to [facility] NEUROLOGY & NEUROSURGERY  History of Present Illness  82-year-old woman evaluated for bilateral hand pain and numbness.  It is worse on the left hand in regards to her numbness.  She has had hand pain for years.  She was seen by orthopedic surgery in the past and had multiple surgeries.  She has difficulty in making a fist especially with her right hand on the pinky and ring finger.  She has pain in her medial wrist bilaterally.  She is here today for nerve conduction study.  Objective   Vital Signs:   /82   Pulse 84   Ht 157.5 cm (62.01\")   Wt 59 kg (130 lb)   BMI 23.77 kg/m²     Physical Exam   There is no weakness of the upper extremity and vision muscle testing.  There is no weakness of intrinsic hand muscles especially the ulnar innervated muscles.  There is no weakness of the right flexor digitorum profundus 3 and 4 but there is limited range of motion under significant amount of pain with strength testing as well as passive range of motion.  Pain in her palms of her hand as well as her wrist to palpation.  There is deformity of her right abductor pollicis brevis muscle from a previous thumb surgery.        Assessment and Plan  Diagnoses and all orders for this visit:    1. Bilateral carpal tunnel syndrome (Primary)  Assessment & Plan:  Nerve conduction study is abnormal and shows electrophysiologic evidence for moderate bilateral carpal tunnel syndrome.  Clinically does not explain her hand pain.  Her hand pain is secondary to arthritis and tendinitis.  I discussed with her that she can follow-up with orthopedic surgery to discuss regarding steroid injections to her left wrist for her numbness and tingling however it will not improve her hand and wrist pain.         Nerve Conduction Study:  7 nerves     EMG:  Not done    Total time spent " with the patient and coordinating patient care was 30 minutes.    Follow Up  No follow-ups on file.  Patient was given instructions and counseling regarding her condition or for health maintenance advice. Please see specific information pulled into the AVS if appropriate.

## 2023-03-31 ENCOUNTER — TRANSCRIBE ORDERS (OUTPATIENT)
Dept: ADMINISTRATIVE | Facility: HOSPITAL | Age: 83
End: 2023-03-31
Payer: MEDICARE

## 2023-03-31 ENCOUNTER — LAB (OUTPATIENT)
Dept: LAB | Facility: HOSPITAL | Age: 83
End: 2023-03-31
Payer: MEDICARE

## 2023-03-31 DIAGNOSIS — R53.83 OTHER FATIGUE: ICD-10-CM

## 2023-03-31 DIAGNOSIS — M81.0 SENILE OSTEOPOROSIS: ICD-10-CM

## 2023-03-31 DIAGNOSIS — Z79.899 ENCOUNTER FOR LONG-TERM (CURRENT) USE OF OTHER MEDICATIONS: ICD-10-CM

## 2023-03-31 DIAGNOSIS — M81.0 OSTEOPOROSIS, POST-MENOPAUSAL: ICD-10-CM

## 2023-03-31 DIAGNOSIS — M81.0 OSTEOPOROSIS, POST-MENOPAUSAL: Primary | ICD-10-CM

## 2023-03-31 LAB
25(OH)D3 SERPL-MCNC: 83.2 NG/ML (ref 30–100)
CALCIUM SPEC-SCNC: 9.5 MG/DL (ref 8.6–10.5)
CREAT SERPL-MCNC: 0.87 MG/DL (ref 0.57–1)
EGFRCR SERPLBLD CKD-EPI 2021: 66.6 ML/MIN/1.73
PTH-INTACT SERPL-MCNC: 35.2 PG/ML (ref 15–65)
T4 FREE SERPL-MCNC: 1.08 NG/DL (ref 0.93–1.7)
TSH SERPL DL<=0.05 MIU/L-ACNC: 1.89 UIU/ML (ref 0.27–4.2)

## 2023-03-31 PROCEDURE — 84439 ASSAY OF FREE THYROXINE: CPT

## 2023-03-31 PROCEDURE — 36415 COLL VENOUS BLD VENIPUNCTURE: CPT

## 2023-03-31 PROCEDURE — 82306 VITAMIN D 25 HYDROXY: CPT

## 2023-03-31 PROCEDURE — 84443 ASSAY THYROID STIM HORMONE: CPT

## 2023-03-31 PROCEDURE — 82565 ASSAY OF CREATININE: CPT

## 2023-03-31 PROCEDURE — 83970 ASSAY OF PARATHORMONE: CPT

## 2023-03-31 PROCEDURE — 82310 ASSAY OF CALCIUM: CPT

## 2023-04-10 PROBLEM — M81.0 POSTMENOPAUSAL OSTEOPOROSIS: Status: ACTIVE | Noted: 2023-04-10

## 2023-04-13 ENCOUNTER — HOSPITAL ENCOUNTER (OUTPATIENT)
Dept: INFUSION THERAPY | Facility: HOSPITAL | Age: 83
Discharge: HOME | End: 2023-04-13
Payer: MEDICARE

## 2023-04-13 VITALS
RESPIRATION RATE: 16 BRPM | DIASTOLIC BLOOD PRESSURE: 73 MMHG | BODY MASS INDEX: 24.18 KG/M2 | TEMPERATURE: 98.4 F | HEIGHT: 62 IN | SYSTOLIC BLOOD PRESSURE: 153 MMHG | HEART RATE: 82 BPM | OXYGEN SATURATION: 99 % | WEIGHT: 131.39 LBS

## 2023-04-13 DIAGNOSIS — M81.0 POSTMENOPAUSAL OSTEOPOROSIS: Primary | ICD-10-CM

## 2023-04-13 PROCEDURE — 25010000002 ZOLEDRONIC ACID 5 MG/100ML SOLUTION

## 2023-04-13 PROCEDURE — 96374 THER/PROPH/DIAG INJ IV PUSH: CPT

## 2023-04-13 RX ORDER — ZOLEDRONIC ACID 5 MG/100ML
5 INJECTION, SOLUTION INTRAVENOUS ONCE
Status: CANCELLED | OUTPATIENT
Start: 2023-04-13

## 2023-04-13 RX ORDER — ZOLEDRONIC ACID 5 MG/100ML
5 INJECTION, SOLUTION INTRAVENOUS ONCE
Status: COMPLETED | OUTPATIENT
Start: 2023-04-13 | End: 2023-04-13

## 2023-04-13 RX ADMIN — ZOLEDRONIC ACID 5 MG: 5 INJECTION, SOLUTION INTRAVENOUS at 13:35

## 2023-04-18 ENCOUNTER — LAB (OUTPATIENT)
Dept: LAB | Facility: HOSPITAL | Age: 83
End: 2023-04-18
Payer: MEDICARE

## 2023-04-18 DIAGNOSIS — I10 ESSENTIAL HYPERTENSION: ICD-10-CM

## 2023-04-18 DIAGNOSIS — E55.9 VITAMIN D DEFICIENCY: ICD-10-CM

## 2023-04-18 DIAGNOSIS — E78.2 MIXED HYPERLIPIDEMIA: ICD-10-CM

## 2023-04-18 LAB
ALBUMIN SERPL-MCNC: 4.5 G/DL (ref 3.5–5.2)
ALBUMIN/GLOB SERPL: 1.6 G/DL
ALP SERPL-CCNC: 82 U/L (ref 39–117)
ALT SERPL W P-5'-P-CCNC: 13 U/L (ref 1–33)
ANION GAP SERPL CALCULATED.3IONS-SCNC: 10 MMOL/L (ref 5–15)
AST SERPL-CCNC: 29 U/L (ref 1–32)
BILIRUB SERPL-MCNC: 0.2 MG/DL (ref 0–1.2)
BUN SERPL-MCNC: 14 MG/DL (ref 8–23)
BUN/CREAT SERPL: 14.9 (ref 7–25)
CALCIUM SPEC-SCNC: 9.4 MG/DL (ref 8.6–10.5)
CHLORIDE SERPL-SCNC: 103 MMOL/L (ref 98–107)
CHOLEST SERPL-MCNC: 250 MG/DL (ref 0–200)
CO2 SERPL-SCNC: 26 MMOL/L (ref 22–29)
CREAT SERPL-MCNC: 0.94 MG/DL (ref 0.57–1)
EGFRCR SERPLBLD CKD-EPI 2021: 60.7 ML/MIN/1.73
GLOBULIN UR ELPH-MCNC: 2.8 GM/DL
GLUCOSE SERPL-MCNC: 82 MG/DL (ref 65–99)
HDLC SERPL-MCNC: 76 MG/DL (ref 40–60)
LDLC SERPL CALC-MCNC: 158 MG/DL (ref 0–100)
LDLC/HDLC SERPL: 2.05 {RATIO}
POTASSIUM SERPL-SCNC: 4.1 MMOL/L (ref 3.5–5.2)
PROT SERPL-MCNC: 7.3 G/DL (ref 6–8.5)
SODIUM SERPL-SCNC: 139 MMOL/L (ref 136–145)
TRIGL SERPL-MCNC: 91 MG/DL (ref 0–150)
VLDLC SERPL-MCNC: 16 MG/DL (ref 5–40)

## 2023-04-18 PROCEDURE — 82306 VITAMIN D 25 HYDROXY: CPT

## 2023-04-18 PROCEDURE — 80053 COMPREHEN METABOLIC PANEL: CPT

## 2023-04-18 PROCEDURE — 80061 LIPID PANEL: CPT

## 2023-04-18 PROCEDURE — 36415 COLL VENOUS BLD VENIPUNCTURE: CPT

## 2023-04-19 PROBLEM — D50.9 IRON DEFICIENCY ANEMIA: Status: RESOLVED | Noted: 2021-10-19 | Resolved: 2023-04-19

## 2023-04-19 PROBLEM — M75.41 IMPINGEMENT SYNDROME OF RIGHT SHOULDER: Status: RESOLVED | Noted: 2018-09-13 | Resolved: 2023-04-19

## 2023-04-19 PROBLEM — Z00.00 MEDICARE ANNUAL WELLNESS VISIT, SUBSEQUENT: Status: ACTIVE | Noted: 2023-04-19

## 2023-04-19 LAB — 25(OH)D3 SERPL-MCNC: 65.5 NG/ML (ref 30–100)

## 2023-04-20 ENCOUNTER — OFFICE VISIT (OUTPATIENT)
Dept: INTERNAL MEDICINE | Facility: CLINIC | Age: 83
End: 2023-04-20
Payer: MEDICARE

## 2023-04-20 VITALS
TEMPERATURE: 97.8 F | BODY MASS INDEX: 23.96 KG/M2 | DIASTOLIC BLOOD PRESSURE: 90 MMHG | WEIGHT: 130.2 LBS | HEIGHT: 62 IN | HEART RATE: 77 BPM | OXYGEN SATURATION: 98 % | SYSTOLIC BLOOD PRESSURE: 160 MMHG

## 2023-04-20 DIAGNOSIS — M81.0 POSTMENOPAUSAL OSTEOPOROSIS: ICD-10-CM

## 2023-04-20 DIAGNOSIS — I10 ESSENTIAL HYPERTENSION: ICD-10-CM

## 2023-04-20 DIAGNOSIS — Z12.31 BREAST CANCER SCREENING BY MAMMOGRAM: ICD-10-CM

## 2023-04-20 DIAGNOSIS — Z00.00 MEDICARE ANNUAL WELLNESS VISIT, SUBSEQUENT: Primary | ICD-10-CM

## 2023-04-20 DIAGNOSIS — E55.9 VITAMIN D DEFICIENCY: ICD-10-CM

## 2023-04-20 DIAGNOSIS — B35.1 ONYCHOMYCOSIS: ICD-10-CM

## 2023-04-20 DIAGNOSIS — E78.2 MIXED HYPERLIPIDEMIA: ICD-10-CM

## 2023-04-20 RX ORDER — TERBINAFINE HYDROCHLORIDE 250 MG/1
250 TABLET ORAL 2 TIMES DAILY
Qty: 42 TABLET | Refills: 1 | Status: SHIPPED | OUTPATIENT
Start: 2023-04-20

## 2023-04-20 RX ORDER — AMLODIPINE BESYLATE 5 MG/1
5 TABLET ORAL 2 TIMES DAILY
Qty: 180 TABLET | Refills: 1 | Status: SHIPPED | OUTPATIENT
Start: 2023-04-20

## 2023-04-20 NOTE — ASSESSMENT & PLAN NOTE
BMD was reviewed at her 10/22 office visit.  Her bone density is down about 5% at the hip over the past 2 years.  Not sure what the difference in the spine BMD is related to.  Patient has completed 5 doses of Reclast since she was intolerant to oral bisphosphonate.  Need to see about getting her in with Dr. King next year in regards to possible other treatments for the osteoporosis.---> Patient was seen in Dr. King's office and placed back on Reclast, she had her first dose just a few weeks ago, is noticing some fatigue from that, but I discussed with her that that is certainly to be expected for just a short while at least.

## 2023-04-20 NOTE — ASSESSMENT & PLAN NOTE
Patient is getting blood pressures in this ballpark frequently at home as well as of 4/23.  She will get numbers like this in the morning before she takes her pill.  She had an issue with ACE inhibitors before, so we will hold off on ARB unless definitely indicated.  We will see how we do with twice daily amlodipine given the a.m. elevations.

## 2023-04-20 NOTE — ASSESSMENT & PLAN NOTE
AWV completed 4/23.  Patient remains active and independent.  No falls and no hospitalizations past year.  Patient's advanced directive is already on file at the hospital.

## 2023-04-20 NOTE — PROGRESS NOTES
The ABCs of the Annual Wellness Visit  Subsequent Medicare Wellness Visit    Subjective      Breanna York is a 82 y.o. female who presents for a Subsequent Medicare Wellness Visit.    The following portions of the patient's history were reviewed and   updated as appropriate: allergies, current medications, past family history, past medical history, past social history, past surgical history and problem list.    Compared to one year ago, the patient feels her physical   health is the same.    Compared to one year ago, the patient feels her mental   health is the same.    Recent Hospitalizations:  She was not admitted to the hospital during the last year.       Current Medical Providers:  Patient Care Team:  Topher Perera MD as PCP - General (Internal Medicine)    Outpatient Medications Prior to Visit   Medication Sig Dispense Refill   • amLODIPine (NORVASC) 5 MG tablet Take 1 tablet by mouth Daily. 90 tablet 1   • Cholecalciferol 50 MCG (2000 UT) tablet Vitamin D3 50 mcg (2,000 unit) oral tablet take 1 tablet by oral route daily   Active     • diclofenac (VOLTAREN) 50 MG EC tablet Take 1 tablet by mouth 2 (Two) Times a Day As Needed (Joint pain). 90 tablet 1   • Diclofenac Sodium (VOLTAREN) 1 % gel gel Apply 4 g topically to the appropriate area as directed 4 (Four) Times a Day As Needed (as needed). 100 g 1   • gabapentin (NEURONTIN) 100 MG capsule TAKE 2 CAPSULES AT BEDTIME 180 capsule 1   • multivitamin with minerals tablet tablet Take 1 tablet by mouth Daily.     • HYDROcodone-acetaminophen (Norco) 5-325 MG per tablet Take 1 tablet by mouth Every 12 (Twelve) Hours As Needed for Moderate Pain. (Patient not taking: Reported on 4/20/2023) 40 tablet 0     No facility-administered medications prior to visit.       Opioid medication/s are on active medication list.  and I have evaluated her active treatment plan and pain score trends (see table).  There were no vitals filed for this visit.  I have reviewed the chart  "for potential of high risk medication and harmful drug interactions in the elderly.            Aspirin is not on active medication list.  Aspirin use is not indicated based on review of current medical condition/s. Risk of harm outweighs potential benefits.  .    Patient Active Problem List   Diagnosis   • Essential hypertension   • Mixed hyperlipidemia   • Vitamin D deficiency   • Primary localized osteoarthritis   • Bruit of right carotid artery   • Cervical radiculopathy   • Bilateral carpal tunnel syndrome   • Postmenopausal osteoporosis   • Medicare annual wellness visit, subsequent   • Onychomycosis     Advance Care Planning   Advance Care Planning     Advance Directive is on file.  ACP discussion was held with the patient during this visit. Patient has an advance directive in EMR which is still valid.      Objective    Vitals:    23 1115   BP: 160/90  Comment: She didn't take her meds this morning   Pulse: 77   Temp: 97.8 °F (36.6 °C)   TempSrc: Skin   SpO2: 98%   Weight: 59.1 kg (130 lb 3.2 oz)   Height: 157.5 cm (62.01\")     Estimated body mass index is 23.81 kg/m² as calculated from the following:    Height as of this encounter: 157.5 cm (62.01\").    Weight as of this encounter: 59.1 kg (130 lb 3.2 oz).    BMI is within normal parameters. No other follow-up for BMI required.      Does the patient have evidence of cognitive impairment?   No    Lab Results   Component Value Date    TRIG 91 2023    HDL 76 (H) 2023     (H) 2023    VLDL 16 2023          HEALTH RISK ASSESSMENT    Smoking Status:  Social History     Tobacco Use   Smoking Status Never   Smokeless Tobacco Never     Alcohol Consumption:  Social History     Substance and Sexual Activity   Alcohol Use Never     Fall Risk Screen:    STEADI Fall Risk Assessment was completed, and patient is at LOW risk for falls.Assessment completed on:2023    Depression Screenin/20/2023    11:22 AM   PHQ-2/PHQ-9 " Depression Screening   Little Interest or Pleasure in Doing Things 0-->not at all   Feeling Down, Depressed or Hopeless 0-->not at all   PHQ-9: Brief Depression Severity Measure Score 0       Health Habits and Functional and Cognitive Screenin/20/2023    11:00 AM   Functional & Cognitive Status   Do you have difficulty preparing food and eating? No   Do you have difficulty bathing yourself, getting dressed or grooming yourself? No   Do you have difficulty using the toilet? No   Do you have difficulty moving around from place to place? No   Do you have trouble with steps or getting out of a bed or a chair? No   Current Diet Well Balanced Diet   Do you need help using the phone?  No   Are you deaf or do you have serious difficulty hearing?  No   Do you need help with transportation? No   Do you need help shopping? No   Do you need help preparing meals?  No   Do you need help with housework?  No   Do you need help with laundry? No   Do you need help taking your medications? No   Do you need help managing money? No   Do you ever drive or ride in a car without wearing a seat belt? No   Do you have difficulty concentrating, remembering or making decisions? No       Age-appropriate Screening Schedule:  Refer to the list below for future screening recommendations based on patient's age, sex and/or medical conditions. Orders for these recommended tests are listed in the plan section. The patient has been provided with a written plan.    Health Maintenance   Topic Date Due   • TDAP/TD VACCINES (1 - Tdap) Never done   • Pneumococcal Vaccine 65+ (1 - PCV) Never done   • INFLUENZA VACCINE  2023   • LIPID PANEL  2024   • ANNUAL WELLNESS VISIT  2024   • DXA SCAN  10/14/2024   • ZOSTER VACCINE  Completed   • COVID-19 Vaccine  Discontinued                  CMS Preventative Services Quick Reference  Risk Factors Identified During Encounter:    None Identified    The above risks/problems have been discussed  with the patient.  Pertinent information has been shared with the patient in the After Visit Summary.    Diagnoses and all orders for this visit:    1. Medicare annual wellness visit, subsequent (Primary)  Assessment & Plan:  AWV completed 4/23.  Patient remains active and independent.  No falls and no hospitalizations past year.  Patient's advanced directive is already on file at the hospital.    Orders:  -     CBC & Differential; Future  -     Vitamin B12 anemia; Future  -     Folate anemia; Future  -     TSH+Free T4; Future    2. Postmenopausal osteoporosis  Assessment & Plan:  BMD was reviewed at her 10/22 office visit.  Her bone density is down about 5% at the hip over the past 2 years.  Not sure what the difference in the spine BMD is related to.  Patient has completed 5 doses of Reclast since she was intolerant to oral bisphosphonate.  Need to see about getting her in with Dr. King next year in regards to possible other treatments for the osteoporosis.---> Patient was seen in Dr. King's office and placed back on Reclast, she had her first dose just a few weeks ago, is noticing some fatigue from that, but I discussed with her that that is certainly to be expected for just a short while at least.      3. Onychomycosis  Assessment & Plan:  This is as of her 4/23 office visit.  Mainly involving the right great toe, but there are other toes involved to a lesser degree.  We will try pulse dosing first, discussed with patient what to expect as far as improvement.      4. Essential hypertension  -     Comprehensive Metabolic Panel; Future    5. Mixed hyperlipidemia  Assessment & Plan:  LDL is stable at 158 as of 4/23.  We will keep an eye on this for academic reasons, certainly if trends up closer to 200 would strongly recommend treatment.    Orders:  -     Lipid Panel; Future    6. Vitamin D deficiency  Assessment & Plan:  Vitamin D is stable at 65 as of 4/23.  Patient will continue with just low-dose  over-the-counter supplementation.      Other orders  -     terbinafine (LamISIL) 250 MG tablet; Take 1 tablet by mouth 2 (Two) Times a Day. Twice daily for 1 week on, 3 weeks off, for 3 cycles.  Dispense: 42 tablet; Refill: 1      Follow Up:   Next Medicare Wellness visit to be scheduled in 1 year.      An After Visit Summary and PPPS were made available to the patient.

## 2023-04-20 NOTE — ASSESSMENT & PLAN NOTE
This is as of her 4/23 office visit.  Mainly involving the right great toe, but there are other toes involved to a lesser degree.  We will try pulse dosing first, discussed with patient what to expect as far as improvement.

## 2023-04-20 NOTE — ASSESSMENT & PLAN NOTE
Vitamin D is stable at 65 as of 4/23.  Patient will continue with just low-dose over-the-counter supplementation.

## 2023-04-20 NOTE — ASSESSMENT & PLAN NOTE
LDL is stable at 158 as of 4/23.  We will keep an eye on this for academic reasons, certainly if trends up closer to 200 would strongly recommend treatment.

## 2023-04-20 NOTE — PROGRESS NOTES
"Chief Complaint  Hypertension, Follow-up (Pt states that this routine, she had labs. ), and Discolored toe nail (Pt has a great tow on the right that is discolored, she doesn't remember doing anything to this. )    Subjective          Breanna York presents to [facility] INTER[facility] MEDICINE     History of Present Illness  Pleasant 81 yo female with HTN, HYPERLIPIDEMIA, DJD, among others, coming in 4/23 for routine 6-month f/u.  We will go over her med list, review her labs in detail, and address any new concerns she may have.    Review of Systems   Constitutional: Negative for appetite change, fatigue and fever.   HENT: Negative for congestion and ear pain.    Eyes: Negative for blurred vision.   Respiratory: Negative for cough, chest tightness, shortness of breath and wheezing.    Cardiovascular: Negative for chest pain, palpitations and leg swelling.   Gastrointestinal: Negative for abdominal pain.   Genitourinary: Negative for difficulty urinating, dysuria and hematuria.   Musculoskeletal: Negative for arthralgias and gait problem.   Skin: Negative for skin lesions.   Neurological: Negative for syncope, memory problem and confusion.   Psychiatric/Behavioral: Negative for self-injury and depressed mood.       Objective   Vital Signs:   /90 Comment: She didn't take her meds this morning  Pulse 77   Temp 97.8 °F (36.6 °C) (Skin)   Ht 157.5 cm (62.01\")   Wt 59.1 kg (130 lb 3.2 oz)   SpO2 98%   BMI 23.81 kg/m²           Physical Exam  Vitals and nursing note reviewed.   Constitutional:       General: She is not in acute distress.     Appearance: Normal appearance. She is not toxic-appearing.   HENT:      Head: Atraumatic.      Right Ear: External ear normal.      Left Ear: External ear normal.      Nose: Nose normal.      Mouth/Throat:      Mouth: Mucous membranes are moist.   Eyes:      General:         Right eye: No discharge.         Left eye: No discharge.      Extraocular Movements: " Extraocular movements intact.      Pupils: Pupils are equal, round, and reactive to light.   Cardiovascular:      Rate and Rhythm: Normal rate and regular rhythm.      Pulses: Normal pulses.      Heart sounds: Normal heart sounds. No murmur heard.    No gallop.   Pulmonary:      Effort: Pulmonary effort is normal. No respiratory distress.      Breath sounds: No wheezing, rhonchi or rales.   Abdominal:      General: There is no distension.      Palpations: Abdomen is soft. There is no mass.      Tenderness: There is no abdominal tenderness. There is no guarding.   Musculoskeletal:         General: No swelling or tenderness.      Cervical back: No tenderness.      Right lower leg: No edema.      Left lower leg: No edema.   Skin:     General: Skin is warm and dry.      Findings: No rash.   Neurological:      General: No focal deficit present.      Mental Status: She is alert and oriented to person, place, and time. Mental status is at baseline.      Motor: No weakness.      Gait: Gait normal.   Psychiatric:         Mood and Affect: Mood normal.         Thought Content: Thought content normal.          Result Review :   The following data was reviewed by: Topher Perera MD on 10/21/2021:                  Assessment and Plan    Diagnoses and all orders for this visit:    1. Medicare annual wellness visit, subsequent (Primary)  Assessment & Plan:  AWV completed 4/23.  Patient remains active and independent.  No falls and no hospitalizations past year.  Patient's advanced directive is already on file at the hospital.    Orders:  -     CBC & Differential; Future  -     Vitamin B12 anemia; Future  -     Folate anemia; Future  -     TSH+Free T4; Future    2. Postmenopausal osteoporosis  Overview:  BMD 1/18...spine -1.0, hip -2.4  BMD 10/20 = spine -1.2, hip -2.3    BMD 10/22:  Spine -2.8  Hip -2.5    Assessment & Plan:  BMD was reviewed at her 10/22 office visit.  Her bone density is down about 5% at the hip over the past 2  years.  Not sure what the difference in the spine BMD is related to.  Patient has completed 5 doses of Reclast since she was intolerant to oral bisphosphonate.  Need to see about getting her in with Dr. King next year in regards to possible other treatments for the osteoporosis.---> Patient was seen in Dr. King's office and placed back on Reclast, she had her first dose just a few weeks ago, is noticing some fatigue from that, but I discussed with her that that is certainly to be expected for just a short while at least.      3. Onychomycosis  Assessment & Plan:  This is as of her 4/23 office visit.  Mainly involving the right great toe, but there are other toes involved to a lesser degree.  We will try pulse dosing first, discussed with patient what to expect as far as improvement.      4. Essential hypertension  Assessment & Plan:  Patient is getting blood pressures in this ballpark frequently at home as well as of 4/23.  She will get numbers like this in the morning before she takes her pill.  She had an issue with ACE inhibitors before, so we will hold off on ARB unless definitely indicated.  We will see how we do with twice daily amlodipine given the a.m. elevations.    Orders:  -     Comprehensive Metabolic Panel; Future    5. Mixed hyperlipidemia  Assessment & Plan:  LDL is stable at 158 as of 4/23.  We will keep an eye on this for academic reasons, certainly if trends up closer to 200 would strongly recommend treatment.    Orders:  -     Lipid Panel; Future    6. Vitamin D deficiency  Assessment & Plan:  Vitamin D is stable at 65 as of 4/23.  Patient will continue with just low-dose over-the-counter supplementation.      7. Breast cancer screening by mammogram  -     Mammo Screening Digital Tomosynthesis Bilateral With CAD; Future    Other orders  -     terbinafine (LamISIL) 250 MG tablet; Take 1 tablet by mouth 2 (Two) Times a Day. Twice daily for 1 week on, 3 weeks off, for 3 cycles.  Dispense: 42  tablet; Refill: 1  -     amLODIPine (NORVASC) 5 MG tablet; Take 1 tablet by mouth 2 (Two) Times a Day.  Dispense: 180 tablet; Refill: 1     --  --  OLDER NOTES:  URGENT VISIT 10/20:  L NECK PAIN just like 12/16 note below; I d/w C-spine series and take aleve 1 tab bid---> signif DJD as expected; did not have to stay on aleve; no radiation;   TACHYCARDIA = HR up 70 to 90+; has CBC/TSH conrad, so will review them when done and eval further on RTO; no CP...TSH neg, CBC with Hgb 11.2; since resolved; was related to Covid infection she said.  COVID-19 + on Labor Day.  --  VISIT 4/21 = above/below.  ANNUAL MEDICARE WELLNESS 4/21 = d/w all forms in office; no new discoveries; Narciso neg.  --  MILD ANEMIA as of 10/20 OV=11.2, down 1 gram past year; no bleeding; f/u on RTO---> Hgb 12 with nl iron as of 4/21, so defer f/u.  --  HTN remains controlled off ACEI as of 7/18 OV; bring machine in=150's at home (stopped ACEI due to severe angiodedema following bug bites); will use norvasc 2.5 if BP trends back up...start now 1/19...better 4/19---> holding 10/19 on these=ditto 4/21.  ?CKD3 = 53% in 4/20 = watch on RTO---> fine 10/20 and same 4/21.  --  LIPIDS at goal '12 w/o tx...ditto 8/15 with HDL 86..., but no tx unless event...141...121 with HDL 95, so defer tx 10/19---> , so I d/w get it back in the 120's please.  --  C/O R HIP PAIN...exam with excellent ROM, neg SLR, point tenderness c/w bursitis...try meds for now and call...better and then returned, no trauma, intermittent, worse up stairs, but able to mow/trim 5 acres, PTA for eval/HEP...worse after PT, s/p injection per ortho in [place] and prn mobic... HIP now, saw ortho in [place], story sounds radicular, but exam more c/w bursitis, so try medrol and keep using heat; also told me ortho was going to conrad MRI and I rec gabapentin again...MRI hip=bursitis and spine with DJD; things are better and only tolerating low dose gabapentin...stable off=ran out and no  worse.  R KNEE PAIN=prior scopes; sx's flaring 1/18, but not severe yet, so no new tx rec...get steroid shots per Dr Vo prn---> L knee issues as of 4/21 and is in PT.  S/P R r TSR on 4/17/19 per Dr Vo...has completed rehab as of 10/19 OV; no pain meds needed.  --  SHINGLES rash is drying up, no secondary infection, but is very sensitive; c/w meds and will push dose...she's weaning dose, but still with sxs 6/17.  L NECK PAIN needing to take ASA daily now; ? CINDY vs carotid calcification, no bruit; needs CT and ? dopplers/etc; please call...it was neg.  BALANCE ISSUES past few months, just has to occ grab wall=needs HCT as well...it was neg  --  HYPERCALCEMIA S/P RESECTION 9/15... PTH 40...PTH 73 and is trending up, but Ca++ 8.8 is stable 1/18...PTH 48...40 in 10/19 and will stop checking.    OSTEOPOROSIS...BMD 1/18...spine -1.0, hip -2.4...intol to actonel so on Reclast #5/5 due 1/19---> BMD 10/20 = spine -1.2, hip -2.3 = stable.  VIT D DEF stable (on 2K qd)...55 in 1/19---> 58 in 10/20.  --  GERD/ABD PAIN...to ER 11/13 with CT=mild prominence of CBD and pancreatic duct, labs neg...no melena, no blood...sx's better with 2x PPI...rare sxs 1/18 on no meds, so call if recurrent and would use H2 first line...dysphagia as of 1/19 OV, to water at times, so needs MBSS to start...non-issue.  --  DEPRESSION/INSOMNIA with 's passing...try trazodone first, SSRI if no benefit or intol...with prn ambien...better with PRN restoril.   --  PETICHIAL LESIONS 8/19 on legs=she showed me pictures at 10/19 OV; said it was non-blanchable, no itch, + stinging though; lasted 3-4 days, started in one spot on LLE and spread; I d/w call when recurs and will get labs.  FACIAL SWELLING 4/18 =nasal and bilateral inner eyelids/etc; top of scalp with scabbed area, but no tick/etc; associated erythema; lungs clear, no dysphagia; will treat with abx as well as prednisone and take benadryl as well... recurred 2 weeks later, tried  ice/benaryl, to ER, given keflex/bactrim/steroid shot; no dysphagia nor breathing issues with either episode; did have mosquito bite; pics reviewed; might as well finish out abx; rec no ACEI and zyrtec 10 mg bid for now; wear bug sprays if going out.  --  --  MMG 11/22/19 per me and is pending as of 4/21 OV.  EGD 7/12...erosive gastritis.  COLON 7/12...normal...defer.   REFUSES flu/pneumo; Shingrix x2; (Pt had Covid-19 in 9/20); rec COVID vac 4/21.  ( '12...after 57 yrs, 4 boys with 1 here and 2 in [place], 1 in ---> had 47 for holidays q year for both holidays=has 22 great-grand already).    Follow Up   Return in about 6 months (around 10/20/2023).  Patient was given instructions and counseling regarding her condition or for health maintenance advice. Please see specific information pulled into the AVS if appropriate.       Answers for HPI/ROS submitted by the patient on 10/16/2022  Please describe your symptoms.: Follow up on test. Pain in knees. Losing usefulness of hand  Have you had these symptoms before?: Yes  How long have you been having these symptoms?: Greater than 2 weeks  Please list any medications you are currently taking for this condition.: Gabapenten, norvas, vit. D, biotin,  What is the primary reason for your visit?: Other

## 2023-05-08 ENCOUNTER — TELEPHONE (OUTPATIENT)
Dept: ORTHOPEDIC SURGERY | Facility: CLINIC | Age: 83
End: 2023-05-08
Payer: MEDICARE

## 2023-05-08 NOTE — TELEPHONE ENCOUNTER
Caller: Breanna York    Relationship to patient: Self    Best call back number: [phone]    Patient is needing: SCHEDULE RIGHT KNEE INJECTION

## 2023-05-08 NOTE — TELEPHONE ENCOUNTER
PT WOULD LIKE TO KNOW IF THEY CAN ADD A LT KNEE INJ TO THEIR 05/11/23 APPT. FOR NEW PROBLEM - LT WRIST    LAST HAD ZILRETTA 02/07/23 - WANTS TO DISCUSS ALTERNATE INJS TOO

## 2023-05-11 ENCOUNTER — OFFICE VISIT (OUTPATIENT)
Dept: ORTHOPEDIC SURGERY | Facility: CLINIC | Age: 83
End: 2023-05-11
Payer: MEDICARE

## 2023-05-11 VITALS — HEIGHT: 62 IN | OXYGEN SATURATION: 95 % | HEART RATE: 93 BPM | WEIGHT: 130 LBS | BODY MASS INDEX: 23.92 KG/M2

## 2023-05-11 DIAGNOSIS — M17.11 OSTEOARTHRITIS OF RIGHT KNEE, UNSPECIFIED OSTEOARTHRITIS TYPE: Primary | ICD-10-CM

## 2023-05-11 DIAGNOSIS — G56.03 CARPAL TUNNEL SYNDROME, BILATERAL: ICD-10-CM

## 2023-05-11 NOTE — PROGRESS NOTES
"Chief Complaint  Follow-up and Pain of the Right Knee, Pain and Initial Evaluation of the Left Wrist, and Initial Evaluation and Pain of the Right Wrist     Subjective      Breanna York presents to [facility] for follow up of the right knee. Initial evaluation of bilateral wrists. She is here for EMG results.  She has been having numbness and tingling in her left hand. She has no numbness and tingling in the right hand bur cannot move the 5 th digit PIP.  She wears thumb braces for work around the house.  Left middle finger is worse at times    She has had osteo arthritis for years she has treated conservatively.  She has had injections in the past and is here for a right knee gel injection.      Allergies   Allergen Reactions   • Ace Inhibitors Angioedema     This occurred after some insect bites, but still need to defer this class of medications going forward   • Meperidine Hallucinations   • Latex Rash        Social History     Socioeconomic History   • Marital status:    Tobacco Use   • Smoking status: Never   • Smokeless tobacco: Never   Vaping Use   • Vaping Use: Never used   Substance and Sexual Activity   • Alcohol use: Never   • Drug use: Never   • Sexual activity: Defer        Review of Systems     Objective   Vital Signs:   Pulse 93   Ht 157.5 cm (62\")   Wt 59 kg (130 lb)   SpO2 95%   BMI 23.78 kg/m²       Physical Exam  Constitutional:       Appearance: Normal appearance. Patient is well-developed and normal weight.   HENT:      Head: Normocephalic.      Right Ear: Hearing and external ear normal.      Left Ear: Hearing and external ear normal.      Nose: Nose normal.   Eyes:      Conjunctiva/sclera: Conjunctivae normal.   Cardiovascular:      Rate and Rhythm: Normal rate.   Pulmonary:      Effort: Pulmonary effort is normal.      Breath sounds: No wheezing or rales.   Abdominal:      Palpations: Abdomen is soft.      Tenderness: There is no abdominal tenderness. "   Musculoskeletal:      Cervical back: Normal range of motion.   Skin:     Findings: No rash.   Neurological:      Mental Status: Patient is alert and oriented to person, place, and time.   Psychiatric:         Mood and Affect: Mood and affect normal.         Judgment: Judgment normal.       Ortho Exam      RIGHT KNEE Flexion 120. Extension 0. Stable to varus/valgus stress. Stable to anterior/posterior drawer. Neurovascularly intact. Negative Talisha. Negative Lachman. Positive EHL, FHL, HS and TA. Sensation intact to light touch all 5 nerves of the foot. Ambulates with Antalgic gait. Patella is well tracking. Calf supple, non-tender. Positive tenderness to the medial joint line. Positive tenderness to the lateral joint line. Positive Crepitus. Good strength to hamstrings, quadriceps, dorsiflexors, and plantar flexors.  Knee Extensor Mechanism intact    BILATERAL WRISTS Negative Compression testing/ Positive Tinels. NegativeFinkelsteins. Negative Linares's testing. Negative CMC grind testing. Positive Phalens. Full ROM of the hand, fingers, elbow and wrist. Negative Triggering of the digit. Sensation grossly intact to light touch, median, radial and ulnar nerve. Positive AIN, PIN and ulnar nerve motor function intact. Axillary nerve intact. Positive pulses.         Large Joint RIGHT KNEE: R knee  Date/Time: 5/11/2023 2:36 PM  Consent given by: patient  Site marked: site marked  Timeout: Immediately prior to procedure a time out was called to verify the correct patient, procedure, equipment, support staff and site/side marked as required   Supporting Documentation  Indications: pain   Procedure Details  Location: knee - R knee  Preparation: Patient was prepped and draped in the usual sterile fashion  Needle gauge: 21G.  Medications administered: 48 mg hylan 48 MG/6ML  Patient tolerance: patient tolerated the procedure well with no immediate complications            Imaging Results (Most Recent)     None            Result Review :     EMG 2/23  impression  Moderate bilateral carpal tunnel syndrome.  Right APB abnormal secondary to previous surgery making interpretation of severity inaccurate.         Assessment and Plan     Diagnoses and all orders for this visit:    1. Osteoarthritis of right knee, unspecified osteoarthritis type (Primary)    2. Carpal tunnel syndrome, bilateral    Other orders  -     Large Joint RIGHT KNEE: R knee        Discussed the treatment plan with the patient. Discussed the risks and benefits of conservative measures bilateral wrists/hand.      The patient expressed understanding and wished to proceed with a right knee Synvisc injection.  She tolerated the injection well.     Call or return if worsening symptoms.    Follow Up     PRN      Patient was given instructions and counseling regarding her condition or for health maintenance advice. Please see specific information pulled into the AVS if appropriate.     Scribed for Candice Vo MD by Elidia Pineda MA.  05/11/23   14:18 EDT    I have personally performed the services described in this document as scribed by the above individual and it is both accurate and complete. Candice oV MD 05/12/23

## 2023-06-01 DIAGNOSIS — G89.29 CHRONIC PAIN WITH DRUG DEPENDENCE: ICD-10-CM

## 2023-06-01 DIAGNOSIS — F19.20 CHRONIC PAIN WITH DRUG DEPENDENCE: ICD-10-CM

## 2023-06-01 RX ORDER — GABAPENTIN 100 MG/1
CAPSULE ORAL
Qty: 180 CAPSULE | Refills: 1 | Status: SHIPPED | OUTPATIENT
Start: 2023-06-01

## 2023-06-08 ENCOUNTER — OFFICE VISIT (OUTPATIENT)
Dept: ORTHOPEDIC SURGERY | Facility: CLINIC | Age: 83
End: 2023-06-08
Payer: MEDICARE

## 2023-06-08 VITALS
BODY MASS INDEX: 23.92 KG/M2 | SYSTOLIC BLOOD PRESSURE: 164 MMHG | OXYGEN SATURATION: 94 % | DIASTOLIC BLOOD PRESSURE: 79 MMHG | WEIGHT: 130 LBS | HEART RATE: 79 BPM | HEIGHT: 62 IN

## 2023-06-08 DIAGNOSIS — M17.12 OSTEOARTHRITIS OF LEFT KNEE, UNSPECIFIED OSTEOARTHRITIS TYPE: Primary | ICD-10-CM

## 2023-06-08 RX ADMIN — LIDOCAINE HYDROCHLORIDE 5 ML: 10 INJECTION, SOLUTION INFILTRATION; PERINEURAL at 14:20

## 2023-06-08 RX ADMIN — TRIAMCINOLONE ACETONIDE 1 ML: 40 INJECTION, SUSPENSION INTRA-ARTICULAR; INTRAMUSCULAR at 14:20

## 2023-06-08 NOTE — PROGRESS NOTES
Chief Complaint  Follow-up and Pain of the Left Knee     Subjective      Breanna York presents to [facility] for follow up of the left knee.  She has had injections for years for her knee osteo arthritis.  She wants to continue to treat her knees conservatively.  She has difficulty with prolonged standing and ambulation.  She is here today for a repeat steroid injection. Her last injection was 10/20/23 with Zilretta.     Allergies   Allergen Reactions    Ace Inhibitors Angioedema     This occurred after some insect bites, but still need to defer this class of medications going forward    Meperidine Hallucinations    Latex Rash        Social History     Socioeconomic History    Marital status:    Tobacco Use    Smoking status: Never    Smokeless tobacco: Never   Vaping Use    Vaping Use: Never used   Substance and Sexual Activity    Alcohol use: Never    Drug use: Never    Sexual activity: Defer        Review of Systems     Objective   Vital Signs:   There were no vitals taken for this visit.      Physical Exam  Constitutional:       Appearance: Normal appearance. Patient is well-developed and normal weight.   HENT:      Head: Normocephalic.      Right Ear: Hearing and external ear normal.      Left Ear: Hearing and external ear normal.      Nose: Nose normal.   Eyes:      Conjunctiva/sclera: Conjunctivae normal.   Cardiovascular:      Rate and Rhythm: Normal rate.   Pulmonary:      Effort: Pulmonary effort is normal.      Breath sounds: No wheezing or rales.   Abdominal:      Palpations: Abdomen is soft.      Tenderness: There is no abdominal tenderness.   Musculoskeletal:      Cervical back: Normal range of motion.   Skin:     Findings: No rash.   Neurological:      Mental Status: Patient is alert and oriented to person, place, and time.   Psychiatric:         Mood and Affect: Mood and affect normal.         Judgment: Judgment normal.       Ortho Exam      LEFT KNEE Flexion  100. Extension -3. Stable to varus/valgus stress. Stable to anterior/posterior drawer. Neurovascularly intact. Negative Talisha. Negative Lachman. Positive EHL, FHL, HS and TA. Sensation intact to light touch all 5 nerves of the foot. Ambulates with Antalgic gait. Patella is well tracking. Calf supple, non-tender. Positive tenderness to the medial joint line. Positive tenderness to the lateral joint line. Positive Crepitus. Good strength to hamstrings, quadriceps, dorsiflexors, and plantar flexors.  Knee Extensor Mechanism intact      Large Joint LEFT KNEE: L knee  Date/Time: 6/8/2023 2:20 PM  Consent given by: patient  Site marked: site marked  Timeout: Immediately prior to procedure a time out was called to verify the correct patient, procedure, equipment, support staff and site/side marked as required   Supporting Documentation  Indications: pain   Procedure Details  Location: knee - L knee  Preparation: Patient was prepped and draped in the usual sterile fashion  Needle gauge: 21G.  Medications administered: 5 mL lidocaine 1 %; 1 mL triamcinolone acetonide 40 MG/ML  Patient tolerance: patient tolerated the procedure well with no immediate complications        Imaging Results (Most Recent)       None             Result Review :           Assessment and Plan     Diagnoses and all orders for this visit:    1. Osteoarthritis of left knee, unspecified osteoarthritis type (Primary)        Discussed the treatment plan with the patient.     Discussed the risks and benefits of conservative measures.  The patient expressed understanding and wished to proceed with a left knee steroid injection.        Call or return if worsening symptoms.    Follow Up     PRN      Patient was given instructions and counseling regarding her condition or for health maintenance advice. Please see specific information pulled into the AVS if appropriate.     Scribed for Candice Vo MD by Elidia Pineda MA.  06/08/23   12:58 EDT    I have  personally performed the services described in this document as scribed by the above individual and it is both accurate and complete. Candice Vo MD 06/09/23

## 2023-06-09 RX ORDER — TRIAMCINOLONE ACETONIDE 40 MG/ML
1 INJECTION, SUSPENSION INTRA-ARTICULAR; INTRAMUSCULAR
Status: COMPLETED | OUTPATIENT
Start: 2023-06-08 | End: 2023-06-08

## 2023-06-09 RX ORDER — LIDOCAINE HYDROCHLORIDE 10 MG/ML
5 INJECTION, SOLUTION INFILTRATION; PERINEURAL
Status: COMPLETED | OUTPATIENT
Start: 2023-06-08 | End: 2023-06-08

## 2023-07-24 ENCOUNTER — TELEPHONE (OUTPATIENT)
Dept: ORTHOPEDIC SURGERY | Facility: CLINIC | Age: 83
End: 2023-07-24
Payer: MEDICARE

## 2023-07-24 NOTE — TELEPHONE ENCOUNTER
Caller: MITZI BRANDON    Relationship to patient: SELF    Best call back number: [phone]    Chief complaint: RIGHT KNEE    Type of visit: CORTISONE INJECTION    Requested date: AFTER 8/11    If rescheduling, when is the original appointment: N/A     Additional notes: ALSO WANTS TO DISCUSS SURGERY DURING THE APPT

## 2023-07-25 NOTE — TELEPHONE ENCOUNTER
LVM FOR PATIENT TO CALL OUR OFFICE BACK TO GET HER AN APPT FOR INJ. SHE IS WANTING A TYRA INJ. HER LAST INJ WAS 6-8

## 2023-08-10 ENCOUNTER — OFFICE VISIT (OUTPATIENT)
Dept: ORTHOPEDIC SURGERY | Facility: CLINIC | Age: 83
End: 2023-08-10
Payer: MEDICARE

## 2023-08-10 VITALS — WEIGHT: 130 LBS | HEIGHT: 62 IN | BODY MASS INDEX: 23.92 KG/M2

## 2023-08-10 DIAGNOSIS — M25.561 RIGHT KNEE PAIN, UNSPECIFIED CHRONICITY: Primary | ICD-10-CM

## 2023-08-10 DIAGNOSIS — M17.11 OSTEOARTHRITIS OF RIGHT KNEE, UNSPECIFIED OSTEOARTHRITIS TYPE: ICD-10-CM

## 2023-08-10 RX ADMIN — BUPIVACAINE HYDROCHLORIDE 5 ML: 5 INJECTION, SOLUTION EPIDURAL; INTRACAUDAL at 10:31

## 2023-08-10 RX ADMIN — TRIAMCINOLONE ACETONIDE 40 MG: 40 INJECTION, SUSPENSION INTRA-ARTICULAR; INTRAMUSCULAR at 10:31

## 2023-08-10 NOTE — PROGRESS NOTES
"Chief Complaint  Pain and Follow-up of the Right Knee     Subjective      Breanna York presents to [facility] for follow up of the right knee.  She has had osteo arthritis in the knee for years.  On 5/11/23 she had a Visco injection and stated that gave no relief.  She has difficulty with prolonged standing, ambulation and stairs.  She notes grinding and popping.  She has had injections, therapy and anti inflammatories in the past. She notes the knee gives way on her at times and she is afraid of falling.     Allergies   Allergen Reactions    Ace Inhibitors Angioedema     This occurred after some insect bites, but still need to defer this class of medications going forward    Meperidine Hallucinations    Latex Rash        Social History     Socioeconomic History    Marital status:    Tobacco Use    Smoking status: Never    Smokeless tobacco: Never   Vaping Use    Vaping Use: Never used   Substance and Sexual Activity    Alcohol use: Never    Drug use: Never    Sexual activity: Defer        I reviewed the patient's chief complaint, history of present illness, review of systems, past medical history, surgical history, family history, social history, medications, and allergy list.     Review of Systems     Constitutional: Denies fevers, chills, weight loss  Cardiovascular: Denies chest pain, shortness of breath  Skin: Denies rashes, acute skin changes  Neurologic: Denies headache, loss of consciousness      Vital Signs:   Ht 157.5 cm (62\")   Wt 59 kg (130 lb)   BMI 23.78 kg/mý          Physical Exam  General: Alert. No acute distress    Ortho Exam        RIGHT KNEE Flexion 115. Extension 0. Stable to varus/valgus stress. Stable to anterior/posterior drawer. Neurovascularly intact. Negative Talisha. Negative Lachman. Positive EHL, FHL, HS and TA. Sensation intact to light touch all 5 nerves of the foot. Ambulates with Antalgic gait. Patella is well tracking. Calf supple, " non-tender. Positive tenderness to the medial joint line. Positive tenderness to the lateral joint line. Positive Crepitus. Good strength to hamstrings, quadriceps, dorsiflexors, and plantar flexors.  Knee Extensor Mechanism intact    Large Joint Arthrocentesis: R knee  Date/Time: 8/10/2023 10:31 AM  Consent given by: patient  Site marked: site marked  Timeout: Immediately prior to procedure a time out was called to verify the correct patient, procedure, equipment, support staff and site/side marked as required   Supporting Documentation  Indications: pain   Procedure Details  Location: knee - R knee  Preparation: Patient was prepped and draped in the usual sterile fashion  Needle gauge: 21g.  Medications administered: 5 mL bupivacaine (PF) 0.5 %; 40 mg triamcinolone acetonide 40 MG/ML  Patient tolerance: patient tolerated the procedure well with no immediate complications        Imaging Results (Most Recent)       Procedure Component Value Units Date/Time    XR Knee 3 View Right [[id number]] Resulted: 08/10/23 1002     Updated: 08/10/23 1006             Result Review :     X-Ray Report:  Right knee X-Ray  Indication: Evaluation of the right knee  AP/Lateral and Hazard view(s)  Findings: Advanced degenerative arthritis.   Prior studies available for comparison: yes             Assessment and Plan     Diagnoses and all orders for this visit:    1. Right knee pain, unspecified chronicity (Primary)  -     XR Knee 3 View Right    2. Osteoarthritis of right knee, unspecified osteoarthritis type        Discussed the treatment plan with the patient. I reviewed the X-rays that were obtained today with the patient.     Discussed the treatment options with the patient, operative vs non-operative.     The patient expressed understanding and wished to proceed with a right total knee arthroplasty later on this fall.      She wants a right knee steroid injection at this time until the weather sets due to yard work.          Discussed surgery., Risks/benefits discussed with patient including, but not limited to: infection, bleeding, neurovascular damage, re-rupture, aesthetic deformity, need for further surgery, and death., Discussed with patient the implant type being used during surgery and patient understands., Surgery pamphlet given., Call or return if worsening symptoms., and DME order for a 3 in 1 given today due to patient will be confined to one room/level of the home that does not offer a toilet during postop recovery.     Follow Up     2 weeks postoperatively       Patient was given instructions and counseling regarding her condition or for health maintenance advice. Please see specific information pulled into the AVS if appropriate.     Scribed for Candice Vo MD by Elidia Pineda MA.  08/10/23   10:11 EDT    I have personally performed the services described in this document as scribed by the above individual and it is both accurate and complete. Candice Vo MD 08/11/23

## 2023-08-11 RX ORDER — BUPIVACAINE HYDROCHLORIDE 5 MG/ML
5 INJECTION, SOLUTION EPIDURAL; INTRACAUDAL
Status: COMPLETED | OUTPATIENT
Start: 2023-08-10 | End: 2023-08-10

## 2023-08-11 RX ORDER — TRIAMCINOLONE ACETONIDE 40 MG/ML
40 INJECTION, SUSPENSION INTRA-ARTICULAR; INTRAMUSCULAR
Status: COMPLETED | OUTPATIENT
Start: 2023-08-10 | End: 2023-08-10

## 2023-09-13 ENCOUNTER — PREP FOR SURGERY (OUTPATIENT)
Dept: OTHER | Facility: HOSPITAL | Age: 83
End: 2023-09-13
Payer: MEDICARE

## 2023-09-13 DIAGNOSIS — M17.11 OSTEOARTHRITIS OF RIGHT KNEE, UNSPECIFIED OSTEOARTHRITIS TYPE: Primary | ICD-10-CM

## 2023-09-13 PROBLEM — M17.9 OA (OSTEOARTHRITIS) OF KNEE: Status: ACTIVE | Noted: 2023-09-13

## 2023-09-13 RX ORDER — CEFAZOLIN SODIUM IN 0.9 % NACL 3 G/100 ML
3 INTRAVENOUS SOLUTION, PIGGYBACK (ML) INTRAVENOUS ONCE
OUTPATIENT
Start: 2023-09-13 | End: 2023-09-13

## 2023-09-13 RX ORDER — TRANEXAMIC ACID 10 MG/ML
1000 INJECTION, SOLUTION INTRAVENOUS ONCE
OUTPATIENT
Start: 2023-09-13 | End: 2023-09-13

## 2023-09-13 RX ORDER — CEFAZOLIN SODIUM 2 G/100ML
2 INJECTION, SOLUTION INTRAVENOUS ONCE
OUTPATIENT
Start: 2023-09-13 | End: 2023-09-13

## 2023-10-02 DIAGNOSIS — M17.11 OSTEOARTHRITIS OF RIGHT KNEE, UNSPECIFIED OSTEOARTHRITIS TYPE: Primary | ICD-10-CM

## 2023-10-04 DIAGNOSIS — Z96.651 AFTERCARE FOLLOWING RIGHT KNEE JOINT REPLACEMENT SURGERY: Primary | ICD-10-CM

## 2023-10-04 DIAGNOSIS — Z47.1 AFTERCARE FOLLOWING RIGHT KNEE JOINT REPLACEMENT SURGERY: Primary | ICD-10-CM

## 2023-10-06 ENCOUNTER — LAB (OUTPATIENT)
Dept: LAB | Facility: HOSPITAL | Age: 83
End: 2023-10-06
Payer: MEDICARE

## 2023-10-06 DIAGNOSIS — Z00.00 MEDICARE ANNUAL WELLNESS VISIT, SUBSEQUENT: ICD-10-CM

## 2023-10-06 DIAGNOSIS — I10 ESSENTIAL HYPERTENSION: ICD-10-CM

## 2023-10-06 DIAGNOSIS — E78.2 MIXED HYPERLIPIDEMIA: ICD-10-CM

## 2023-10-06 LAB
ALBUMIN SERPL-MCNC: 4.8 G/DL (ref 3.5–5.2)
ALBUMIN/GLOB SERPL: 1.8 G/DL
ALP SERPL-CCNC: 71 U/L (ref 39–117)
ALT SERPL W P-5'-P-CCNC: 14 U/L (ref 1–33)
ANION GAP SERPL CALCULATED.3IONS-SCNC: 12 MMOL/L (ref 5–15)
AST SERPL-CCNC: 25 U/L (ref 1–32)
BASOPHILS # BLD AUTO: 0.05 10*3/MM3 (ref 0–0.2)
BASOPHILS NFR BLD AUTO: 1 % (ref 0–1.5)
BILIRUB SERPL-MCNC: 0.4 MG/DL (ref 0–1.2)
BUN SERPL-MCNC: 18 MG/DL (ref 8–23)
BUN/CREAT SERPL: 18.9 (ref 7–25)
CALCIUM SPEC-SCNC: 9.9 MG/DL (ref 8.6–10.5)
CHLORIDE SERPL-SCNC: 103 MMOL/L (ref 98–107)
CHOLEST SERPL-MCNC: 250 MG/DL (ref 0–200)
CO2 SERPL-SCNC: 26 MMOL/L (ref 22–29)
CREAT SERPL-MCNC: 0.95 MG/DL (ref 0.57–1)
DEPRECATED RDW RBC AUTO: 42.3 FL (ref 37–54)
EGFRCR SERPLBLD CKD-EPI 2021: 59.6 ML/MIN/1.73
EOSINOPHIL # BLD AUTO: 0.15 10*3/MM3 (ref 0–0.4)
EOSINOPHIL NFR BLD AUTO: 3 % (ref 0.3–6.2)
ERYTHROCYTE [DISTWIDTH] IN BLOOD BY AUTOMATED COUNT: 12.5 % (ref 12.3–15.4)
FOLATE SERPL-MCNC: 19 NG/ML (ref 4.78–24.2)
GLOBULIN UR ELPH-MCNC: 2.7 GM/DL
GLUCOSE SERPL-MCNC: 88 MG/DL (ref 65–99)
HCT VFR BLD AUTO: 36.2 % (ref 34–46.6)
HDLC SERPL-MCNC: 75 MG/DL (ref 40–60)
HGB BLD-MCNC: 12.3 G/DL (ref 12–15.9)
IMM GRANULOCYTES # BLD AUTO: 0.01 10*3/MM3 (ref 0–0.05)
IMM GRANULOCYTES NFR BLD AUTO: 0.2 % (ref 0–0.5)
LDLC SERPL CALC-MCNC: 157 MG/DL (ref 0–100)
LDLC/HDLC SERPL: 2.06 {RATIO}
LYMPHOCYTES # BLD AUTO: 1.45 10*3/MM3 (ref 0.7–3.1)
LYMPHOCYTES NFR BLD AUTO: 29.2 % (ref 19.6–45.3)
MCH RBC QN AUTO: 31.2 PG (ref 26.6–33)
MCHC RBC AUTO-ENTMCNC: 34 G/DL (ref 31.5–35.7)
MCV RBC AUTO: 91.9 FL (ref 79–97)
MONOCYTES # BLD AUTO: 0.5 10*3/MM3 (ref 0.1–0.9)
MONOCYTES NFR BLD AUTO: 10.1 % (ref 5–12)
NEUTROPHILS NFR BLD AUTO: 2.8 10*3/MM3 (ref 1.7–7)
NEUTROPHILS NFR BLD AUTO: 56.5 % (ref 42.7–76)
NRBC BLD AUTO-RTO: 0 /100 WBC (ref 0–0.2)
PLATELET # BLD AUTO: 255 10*3/MM3 (ref 140–450)
PMV BLD AUTO: 10 FL (ref 6–12)
POTASSIUM SERPL-SCNC: 3.9 MMOL/L (ref 3.5–5.2)
PROT SERPL-MCNC: 7.5 G/DL (ref 6–8.5)
RBC # BLD AUTO: 3.94 10*6/MM3 (ref 3.77–5.28)
SODIUM SERPL-SCNC: 141 MMOL/L (ref 136–145)
T4 FREE SERPL-MCNC: 1.08 NG/DL (ref 0.93–1.7)
TRIGL SERPL-MCNC: 103 MG/DL (ref 0–150)
TSH SERPL DL<=0.05 MIU/L-ACNC: 2.61 UIU/ML (ref 0.27–4.2)
VIT B12 BLD-MCNC: 307 PG/ML (ref 211–946)
VLDLC SERPL-MCNC: 18 MG/DL (ref 5–40)
WBC NRBC COR # BLD: 4.96 10*3/MM3 (ref 3.4–10.8)

## 2023-10-06 PROCEDURE — 36415 COLL VENOUS BLD VENIPUNCTURE: CPT

## 2023-10-06 PROCEDURE — 80061 LIPID PANEL: CPT

## 2023-10-06 PROCEDURE — 80053 COMPREHEN METABOLIC PANEL: CPT

## 2023-10-06 PROCEDURE — 82607 VITAMIN B-12: CPT

## 2023-10-06 PROCEDURE — 84443 ASSAY THYROID STIM HORMONE: CPT

## 2023-10-06 PROCEDURE — 84439 ASSAY OF FREE THYROXINE: CPT

## 2023-10-06 PROCEDURE — 85025 COMPLETE CBC W/AUTO DIFF WBC: CPT

## 2023-10-06 PROCEDURE — 82746 ASSAY OF FOLIC ACID SERUM: CPT

## 2023-10-06 NOTE — PROGRESS NOTES
"Chief Complaint  Hypertension and Follow-up (Pt states that this is routine, she had labs. She is scheduled for TRK on Monday the 16th with Dr. Vo. )    Subjective          Breanna York presents to [facility]     History of Present Illness  Pleasant 82 yo female with HTN, HYPERLIPIDEMIA, DJD, among others, coming in 10/23 for routine 6-month f/u.  We will go over her med list, review her labs in detail, and address any new concerns she may have.    Review of Systems   Constitutional:  Negative for appetite change, fatigue and fever.   HENT:  Negative for congestion and ear pain.    Eyes:  Negative for blurred vision.   Respiratory:  Negative for cough, chest tightness, shortness of breath and wheezing.    Cardiovascular:  Negative for chest pain, palpitations and leg swelling.   Gastrointestinal:  Negative for abdominal pain.   Genitourinary:  Negative for difficulty urinating, dysuria and hematuria.   Musculoskeletal:  Negative for arthralgias and gait problem.   Skin:  Negative for skin lesions.   Neurological:  Negative for syncope, memory problem and confusion.   Psychiatric/Behavioral:  Negative for self-injury and depressed mood.        Objective   Vital Signs:   /70   Pulse 97   Temp 97.7 øF (36.5 øC) (Skin)   Ht 157.5 cm (62.01\")   Wt 58.4 kg (128 lb 12.8 oz)   SpO2 98%   BMI 23.55 kg/mý           Physical Exam  Vitals and nursing note reviewed.   Constitutional:       General: She is not in acute distress.     Appearance: Normal appearance. She is not toxic-appearing.   HENT:      Head: Atraumatic.      Right Ear: External ear normal.      Left Ear: External ear normal.      Nose: Nose normal.      Mouth/Throat:      Mouth: Mucous membranes are moist.   Eyes:      General:         Right eye: No discharge.         Left eye: No discharge.      Extraocular Movements: Extraocular movements intact.      Pupils: Pupils are equal, round, and reactive to light. "   Cardiovascular:      Rate and Rhythm: Normal rate and regular rhythm.      Pulses: Normal pulses.      Heart sounds: Normal heart sounds. No murmur heard.     No gallop.   Pulmonary:      Effort: Pulmonary effort is normal. No respiratory distress.      Breath sounds: No wheezing, rhonchi or rales.   Abdominal:      General: There is no distension.      Palpations: Abdomen is soft. There is no mass.      Tenderness: There is no abdominal tenderness. There is no guarding.   Musculoskeletal:         General: No swelling or tenderness.      Cervical back: No tenderness.      Right lower leg: No edema.      Left lower leg: No edema.   Skin:     General: Skin is warm and dry.      Findings: No rash.   Neurological:      General: No focal deficit present.      Mental Status: She is alert and oriented to person, place, and time. Mental status is at baseline.      Motor: No weakness.      Gait: Gait normal.   Psychiatric:         Mood and Affect: Mood normal.         Thought Content: Thought content normal.          Result Review :   The following data was reviewed by: Topher Perera MD on 10/21/2021:                  Assessment and Plan    Diagnoses and all orders for this visit:    1. Essential hypertension (Primary)  Assessment & Plan:  Blood pressure is well controlled as of her 10/23 office visit.  She has a upcoming knee surgery planned, discussed with her to watch her blood pressure extra close postoperatively, she may have to hold periodic doses of amlodipine as she is done in the past.  Otherwise stable to continue with full dose amlodipine as written.    Orders:  -     Comprehensive Metabolic Panel; Future    2. Mixed hyperlipidemia  Assessment & Plan:  LDL of 157 is stable for her.  She got this down into the 130s with conservative treatment in the past, has previously not been interested in statin therapy due to significant underlying osteoarthritis in general.  Given age, certainly seems reasonable to treat  for secondary prevention only.    Orders:  -     Lipid Panel; Future    3. Primary localized osteoarthritis  Assessment & Plan:  Patient has Voltaren on her list, she typically just uses this as needed.  Her CBC and CMP are unremarkable.  It is currently on hold, patient is scheduled to undergo right total knee replacement next week by Dr. Vo as of her 10/23 OV.    Orders:  -     HYDROcodone-acetaminophen (Norco) 5-325 MG per tablet; 1/2 to 1 tablet up to 4 times a day as needed for postop pain.  Dispense: 40 tablet; Refill: 0    4. Vitamin D deficiency  Assessment & Plan:  Patient being followed by Dr. King presently, she checked her vitamin D level in 6/23, it was stable in the mid 50s.  Patient will continue with just low-dose supplementation.      5. Postmenopausal osteoporosis  Overview:  BMD 1/18...spine -1.0, hip -2.4  BMD 10/20 = spine -1.2, hip -2.3    BMD 10/22:  Spine -2.8  Hip -2.5    Assessment & Plan:  Patient was referred to Dr. King in 2023 secondary to questionable response to Reclast.  She had been on it for about 5 years, she is intolerant to oral bisphosphonates.  It is Dr. King's recommendation at this time to continue with Reclast a little longer at least.  Appreciate her expertise.      6. Acute postoperative anemia due to expected blood loss  -     CBC & Differential; Future  -     Ferritin; Future  -     Iron Profile; Future    7. Vitamin B12 deficiency  -     Vitamin B12 anemia; Future  -     Folate anemia; Future       --  --  OLDER NOTES:  URGENT VISIT 10/20:  L NECK PAIN just like 12/16 note below; I d/w C-spine series and take aleve 1 tab bid---> signif DJD as expected; did not have to stay on aleve; no radiation;   TACHYCARDIA = HR up 70 to 90+; has CBC/TSH conrad, so will review them when done and eval further on RTO; no CP...TSH neg, CBC with Hgb 11.2; since resolved; was related to Covid infection she said.  COVID-19 + on Labor Day.  --  VISIT 4/21 = above/below.  ANNUAL MEDICARE  WELLNESS 4/21 = d/w all forms in office; no new discoveries; Narciso neg.  --  MILD ANEMIA as of 10/20 OV=11.2, down 1 gram past year; no bleeding; f/u on RTO---> Hgb 12 with nl iron as of 4/21, so defer f/u.  --  HTN remains controlled off ACEI as of 7/18 OV; bring machine in=150's at home (stopped ACEI due to severe angiodedema following bug bites); will use norvasc 2.5 if BP trends back up...start now 1/19...better 4/19---> holding 10/19 on these=ditto 4/21.  ?CKD3 = 53% in 4/20 = watch on RTO---> fine 10/20 and same 4/21.  --  LIPIDS at goal '12 w/o tx...ditto 8/15 with HDL 86..., but no tx unless event...141...121 with HDL 95, so defer tx 10/19---> , so I d/w get it back in the 120's please.  --  C/O R HIP PAIN...exam with excellent ROM, neg SLR, point tenderness c/w bursitis...try meds for now and call...better and then returned, no trauma, intermittent, worse up stairs, but able to mow/trim 5 acres, PTA for eval/HEP...worse after PT, s/p injection per ortho in [place] and prn mobic... HIP now, saw ortho in [place], story sounds radicular, but exam more c/w bursitis, so try medrol and keep using heat; also told me ortho was going to [facility] MRI and I rec gabapentin again...MRI hip=bursitis and spine with DJD; things are better and only tolerating low dose gabapentin...stable off=ran out and no worse.  R KNEE PAIN=prior scopes; sx's flaring 1/18, but not severe yet, so no new tx rec...get steroid shots per Dr Vo prcindy---> L knee issues as of 4/21 and is in PT.  S/P R r TSR on 4/17/19 per Dr Vo...has completed rehab as of 10/19 OV; no pain meds needed.  --  SHINGLES rash is drying up, no secondary infection, but is very sensitive; c/w meds and will push dose...she's weaning dose, but still with sxs 6/17.  L NECK PAIN needing to take ASA daily now; ? CINDY vs carotid calcification, no bruit; needs CT and ? dopplers/etc; please call...it was neg.  BALANCE ISSUES past few months, just has to occ grab  wall=needs HCT as well...it was neg  --  HYPERCALCEMIA S/P RESECTION 9/15... PTH 40...PTH 73 and is trending up, but Ca++ 8.8 is stable 1/18...PTH 48...40 in 10/19 and will stop checking.    OSTEOPOROSIS...BMD 1/18...spine -1.0, hip -2.4...intol to actonel so on Reclast #5/5 due 1/19---> BMD 10/20 = spine -1.2, hip -2.3 = stable.  VIT D DEF stable (on 2K qd)...55 in 1/19---> 58 in 10/20.  --  GERD/ABD PAIN...to ER 11/13 with CT=mild prominence of CBD and pancreatic duct, labs neg...no melena, no blood...sx's better with 2x PPI...rare sxs 1/18 on no meds, so call if recurrent and would use H2 first line...dysphagia as of 1/19 OV, to water at times, so needs MBSS to start...non-issue.  --  DEPRESSION/INSOMNIA with 's passing...try trazodone first, SSRI if no benefit or intol...with prn ambien...better with PRN restoril.   --  PETICHIAL LESIONS 8/19 on legs=she showed me pictures at 10/19 OV; said it was non-blanchable, no itch, + stinging though; lasted 3-4 days, started in one spot on LLE and spread; I d/w call when recurs and will get labs.  FACIAL SWELLING 4/18 =nasal and bilateral inner eyelids/etc; top of scalp with scabbed area, but no tick/etc; associated erythema; lungs clear, no dysphagia; will treat with abx as well as prednisone and take benadryl as well... recurred 2 weeks later, tried ice/benaryl, to ER, given keflex/bactrim/steroid shot; no dysphagia nor breathing issues with either episode; did have mosquito bite; pics reviewed; might as well finish out abx; rec no ACEI and zyrtec 10 mg bid for now; wear bug sprays if going out.  --  --  MMG 11/22/19 per me and is pending as of 4/21 OV.  EGD 7/12...erosive gastritis.  COLON 7/12...normal...defer.   REFUSES flu/pneumo; Shingrix x2;  ( '12...after 57 yrs, 4 boys with 1 here and 2 in [place], 1 in ---> had 47 for holidays q year for both holidays=has 22 great-grand already).    Follow Up   Return in about 6 months (around  4/10/2024).  Patient was given instructions and counseling regarding her condition or for health maintenance advice. Please see specific information pulled into the AVS if appropriate.

## 2023-10-09 ENCOUNTER — PRE-ADMISSION TESTING (OUTPATIENT)
Dept: PREADMISSION TESTING | Facility: HOSPITAL | Age: 83
End: 2023-10-09
Payer: MEDICARE

## 2023-10-09 VITALS — BODY MASS INDEX: 24.02 KG/M2 | HEIGHT: 62 IN | WEIGHT: 130.51 LBS

## 2023-10-09 DIAGNOSIS — M17.11 OSTEOARTHRITIS OF RIGHT KNEE, UNSPECIFIED OSTEOARTHRITIS TYPE: ICD-10-CM

## 2023-10-09 LAB
HBA1C MFR BLD: 5.2 % (ref 4.8–5.6)
INR PPP: 0.93 (ref 0.86–1.15)
PROTHROMBIN TIME: 12.6 SECONDS (ref 11.8–14.9)

## 2023-10-09 PROCEDURE — 93005 ELECTROCARDIOGRAM TRACING: CPT

## 2023-10-09 PROCEDURE — 83036 HEMOGLOBIN GLYCOSYLATED A1C: CPT

## 2023-10-09 PROCEDURE — 85610 PROTHROMBIN TIME: CPT

## 2023-10-09 PROCEDURE — 36415 COLL VENOUS BLD VENIPUNCTURE: CPT

## 2023-10-09 RX ORDER — BIOTIN 1000 MCG
TABLET,CHEWABLE ORAL
Status: ON HOLD | COMMUNITY

## 2023-10-09 NOTE — SIGNIFICANT NOTE
DESIRES [facility] IF POSSIBLE DUE TO BEING ALONE, IF NOT HOME HEALTH.  GOAL LESS PAIN.

## 2023-10-09 NOTE — DISCHARGE INSTRUCTIONS
"IMPORTANT INSTRUCTIONS - PRE-ADMISSION TESTING  DO NOT EAT OR CHEW anything after midnight the night before your procedure.    You may have CLEAR liquids up to __3_ hours prior to ARRIVAL time.   Take the following medications the morning of your procedure with JUST A SIP OF WATER:  _AMLODIPINE_____________    DO NOT BRING your medications to the hospital with you, UNLESS something has changed since your PRE-Admission Testing appointment.  Hold all vitamins, supplements, and NSAIDS (Non- steroidal anti-inflammatory meds) for one week prior to surgery (you MAY take Tylenol or Acetaminophen).  If you are diabetic, check your blood sugar the morning of your procedure. If it is less than 70 or if you are feeling symptomatic, call the following number for further instructions: [phone]_.  Use your inhalers/nebulizers as usual, the morning of your procedure. BRING YOUR INHALERS with you.   Bring your CPAP or BIPAP to hospital, ONLY IF YOU WILL BE SPENDING THE NIGHT.   Make sure you have a ride home and have someone who will stay with you the day of your procedure after you go home.  If you have any questions, please call your Pre-Admission Testing Nurse, KATERINE __ at [phone].   Per anesthesia request, do not smoke for 24 hours before your procedure or as instructed by your surgeon.    Clear Liquid Diet        Find out when you need to start a clear liquid diet.   Think of "clear liquids" as anything you could read a newspaper through. This includes things like water, broth, sports drinks, or tea WITHOUT any kind of milk or cream.           Once you are told to start a clear liquid diet, only drink these things until 3 hours before arrival to the hospital or when the hospital says to stop. Total volume limitation: 8 oz.       Clear liquids you CAN drink:   Water   Clear broth: beef, chicken, vegetable, or bone broth with nothing in it   Gatorade   Lemonade or Simón-aid   Soda   Tea, coffee (NO cream or honey) "   Jell-O (without fruit)   Popsicles (without fruit or cream)   Italian ices   Juice without pulp: apple, white, grape   You may use salt, pepper, and sugar    Do NOT drink:   Milk or cream   Soy milk, almond milk, coconut milk, or other non-dairy drinks and   creamers   Milkshakes or smoothies   Tomato juice   Orange juice   Grapefruit juice   Cream soups or any other than broth         Clear Liquid Diet:  Do NOT eat any solid food.  Do NOT eat or suck on mints or candy.  Do NOT chew gum.  Do NOT drink thick liquids like milk or juice with pulp in it.  Do NOT add milk, cream, or anything like soy milk or almond milk to coffee or tea.

## 2023-10-10 ENCOUNTER — OFFICE VISIT (OUTPATIENT)
Dept: INTERNAL MEDICINE | Facility: CLINIC | Age: 83
End: 2023-10-10
Payer: MEDICARE

## 2023-10-10 ENCOUNTER — ANESTHESIA EVENT (OUTPATIENT)
Dept: PERIOP | Facility: HOSPITAL | Age: 83
End: 2023-10-10
Payer: MEDICARE

## 2023-10-10 VITALS
DIASTOLIC BLOOD PRESSURE: 70 MMHG | TEMPERATURE: 97.7 F | HEART RATE: 97 BPM | HEIGHT: 62 IN | SYSTOLIC BLOOD PRESSURE: 140 MMHG | WEIGHT: 128.8 LBS | OXYGEN SATURATION: 98 % | BODY MASS INDEX: 23.7 KG/M2

## 2023-10-10 DIAGNOSIS — M19.91 PRIMARY LOCALIZED OSTEOARTHRITIS: ICD-10-CM

## 2023-10-10 DIAGNOSIS — M81.0 POSTMENOPAUSAL OSTEOPOROSIS: ICD-10-CM

## 2023-10-10 DIAGNOSIS — E55.9 VITAMIN D DEFICIENCY: ICD-10-CM

## 2023-10-10 DIAGNOSIS — E78.2 MIXED HYPERLIPIDEMIA: ICD-10-CM

## 2023-10-10 DIAGNOSIS — E53.8 VITAMIN B12 DEFICIENCY: ICD-10-CM

## 2023-10-10 DIAGNOSIS — D62 ACUTE POSTOPERATIVE ANEMIA DUE TO EXPECTED BLOOD LOSS: ICD-10-CM

## 2023-10-10 DIAGNOSIS — I10 ESSENTIAL HYPERTENSION: Primary | ICD-10-CM

## 2023-10-10 LAB
QT INTERVAL: 384 MS
QTC INTERVAL: 440 MS

## 2023-10-10 PROCEDURE — 1160F RVW MEDS BY RX/DR IN RCRD: CPT | Performed by: INTERNAL MEDICINE

## 2023-10-10 PROCEDURE — 3078F DIAST BP <80 MM HG: CPT | Performed by: INTERNAL MEDICINE

## 2023-10-10 PROCEDURE — 99214 OFFICE O/P EST MOD 30 MIN: CPT | Performed by: INTERNAL MEDICINE

## 2023-10-10 PROCEDURE — 1159F MED LIST DOCD IN RCRD: CPT | Performed by: INTERNAL MEDICINE

## 2023-10-10 PROCEDURE — 3077F SYST BP >= 140 MM HG: CPT | Performed by: INTERNAL MEDICINE

## 2023-10-10 RX ORDER — HYDROCODONE BITARTRATE AND ACETAMINOPHEN 5; 325 MG/1; MG/1
TABLET ORAL
Qty: 40 TABLET | Refills: 0 | Status: ON HOLD | OUTPATIENT
Start: 2023-10-10

## 2023-10-10 NOTE — ASSESSMENT & PLAN NOTE
Patient being followed by Dr. King presently, she checked her vitamin D level in 6/23, it was stable in the mid 50s.  Patient will continue with just low-dose supplementation.

## 2023-10-10 NOTE — ASSESSMENT & PLAN NOTE
Blood pressure is well controlled as of her 10/23 office visit.  She has a upcoming knee surgery planned, discussed with her to watch her blood pressure extra close postoperatively, she may have to hold periodic doses of amlodipine as she is done in the past.  Otherwise stable to continue with full dose amlodipine as written.

## 2023-10-10 NOTE — ASSESSMENT & PLAN NOTE
Patient was referred to Dr. King in 2023 secondary to questionable response to Reclast.  She had been on it for about 5 years, she is intolerant to oral bisphosphonates.  It is Dr. King's recommendation at this time to continue with Reclast a little longer at least.  Appreciate her expertise.

## 2023-10-10 NOTE — ASSESSMENT & PLAN NOTE
Patient has Voltaren on her list, she typically just uses this as needed.  Her CBC and CMP are unremarkable.  It is currently on hold, patient is scheduled to undergo right total knee replacement next week by Dr. Vo as of her 10/23 OV.

## 2023-10-10 NOTE — ASSESSMENT & PLAN NOTE
LDL of 157 is stable for her.  She got this down into the 130s with conservative treatment in the past, has previously not been interested in statin therapy due to significant underlying osteoarthritis in general.  Given age, certainly seems reasonable to treat for secondary prevention only.

## 2023-10-15 NOTE — H&P
Chief Complaint  No chief complaint on file.     Subjective      Breanna York presents to [facility] for follow up of the right knee.  She has had osteo arthritis in the knee for years.  On 5/11/23 she had a Visco injection and stated that gave no relief.  She has difficulty with prolonged standing, ambulation and stairs.  She notes grinding and popping.  She has had injections, therapy and anti inflammatories in the past. She notes the knee gives way on her at times and she is afraid of falling.     Allergies   Allergen Reactions    Ace Inhibitors Angioedema     This occurred after some insect bites, but still need to defer this class of medications going forward    Meperidine Hallucinations    Latex Rash        Social History     Socioeconomic History    Marital status:    Tobacco Use    Smoking status: Never    Smokeless tobacco: Never   Vaping Use    Vaping Use: Never used   Substance and Sexual Activity    Alcohol use: Never    Drug use: Never    Sexual activity: Defer        I reviewed the patient's chief complaint, history of present illness, review of systems, past medical history, surgical history, family history, social history, medications, and allergy list.     Review of Systems     Constitutional: Denies fevers, chills, weight loss  Cardiovascular: Denies chest pain, shortness of breath  Skin: Denies rashes, acute skin changes  Neurologic: Denies headache, loss of consciousness      Vital Signs:   There were no vitals taken for this visit.         Physical Exam  General: Alert. No acute distress    Ortho Exam        RIGHT KNEE Flexion 115. Extension 0. Stable to varus/valgus stress. Stable to anterior/posterior drawer. Neurovascularly intact. Negative Talisha. Negative Lachman. Positive EHL, FHL, HS and TA. Sensation intact to light touch all 5 nerves of the foot. Ambulates with Antalgic gait. Patella is well tracking. Calf supple, non-tender. Positive tenderness to the  medial joint line. Positive tenderness to the lateral joint line. Positive Crepitus. Good strength to hamstrings, quadriceps, dorsiflexors, and plantar flexors.  Knee Extensor Mechanism intact    Procedures    Imaging Results (Most Recent)       None             Result Review :     X-Ray Report:  Right knee X-Ray  Indication: Evaluation of the right knee  AP/Lateral and [place] view(s)  Findings: Advanced degenerative arthritis.   Prior studies available for comparison: yes             Assessment and Plan     There are no diagnoses linked to this encounter.      Discussed the treatment plan with the patient. I reviewed the X-rays that were obtained today with the patient.     Discussed the treatment options with the patient, operative vs non-operative.     The patient expressed understanding and wished to proceed with a right total knee arthroplasty later on this fall.      She wants a right knee steroid injection at this time until the weather sets due to yard work.         Discussed surgery., Risks/benefits discussed with patient including, but not limited to: infection, bleeding, neurovascular damage, re-rupture, aesthetic deformity, need for further surgery, and death., Discussed with patient the implant type being used during surgery and patient understands., Surgery pamphlet given., Call or return if worsening symptoms., and DME order for a 3 in 1 given today due to patient will be confined to one room/level of the home that does not offer a toilet during postop recovery.     Follow Up     2 weeks postoperatively       I have personally performed the services described in this document as scribed by the above individual and it is both accurate and complete. Candice Vo MD 10/15/23

## 2023-10-16 ENCOUNTER — ANESTHESIA (OUTPATIENT)
Dept: PERIOP | Facility: HOSPITAL | Age: 83
End: 2023-10-16
Payer: MEDICARE

## 2023-10-16 ENCOUNTER — HOSPITAL ENCOUNTER (OUTPATIENT)
Facility: HOSPITAL | Age: 83
Discharge: HOME HEALTH | End: 2023-10-17
Attending: ORTHOPAEDIC SURGERY | Admitting: ORTHOPAEDIC SURGERY
Payer: MEDICARE

## 2023-10-16 ENCOUNTER — APPOINTMENT (OUTPATIENT)
Dept: GENERAL RADIOLOGY | Facility: HOSPITAL | Age: 83
End: 2023-10-16
Payer: MEDICARE

## 2023-10-16 ENCOUNTER — ANESTHESIA EVENT CONVERTED (OUTPATIENT)
Dept: ANESTHESIOLOGY | Facility: HOSPITAL | Age: 83
End: 2023-10-16
Payer: MEDICARE

## 2023-10-16 DIAGNOSIS — M17.11 OSTEOARTHRITIS OF RIGHT KNEE, UNSPECIFIED OSTEOARTHRITIS TYPE: ICD-10-CM

## 2023-10-16 DIAGNOSIS — Z78.9 DECREASED ACTIVITIES OF DAILY LIVING (ADL): ICD-10-CM

## 2023-10-16 DIAGNOSIS — R26.2 DIFFICULTY IN WALKING: Primary | ICD-10-CM

## 2023-10-16 PROCEDURE — 25010000002 PROPOFOL 10 MG/ML EMULSION: Performed by: NURSE ANESTHETIST, CERTIFIED REGISTERED

## 2023-10-16 PROCEDURE — 25010000002 KETOROLAC TROMETHAMINE PER 15 MG: Performed by: ORTHOPAEDIC SURGERY

## 2023-10-16 PROCEDURE — 25010000002 MORPHINE PER 10 MG: Performed by: ORTHOPAEDIC SURGERY

## 2023-10-16 PROCEDURE — 25010000002 MIDAZOLAM PER 1MG: Performed by: ANESTHESIOLOGY

## 2023-10-16 PROCEDURE — 63710000001 CELECOXIB 100 MG CAPSULE: Performed by: ANESTHESIOLOGY

## 2023-10-16 PROCEDURE — 25810000003 LACTATED RINGERS PER 1000 ML: Performed by: ORTHOPAEDIC SURGERY

## 2023-10-16 PROCEDURE — 27447 TOTAL KNEE ARTHROPLASTY: CPT | Performed by: ORTHOPAEDIC SURGERY

## 2023-10-16 PROCEDURE — 97161 PT EVAL LOW COMPLEX 20 MIN: CPT

## 2023-10-16 PROCEDURE — 63710000001 HYDROCODONE-ACETAMINOPHEN 5-325 MG TABLET: Performed by: ORTHOPAEDIC SURGERY

## 2023-10-16 PROCEDURE — 25010000002 HYDROMORPHONE 1 MG/ML SOLUTION: Performed by: NURSE ANESTHETIST, CERTIFIED REGISTERED

## 2023-10-16 PROCEDURE — 25010000002 CEFAZOLIN IN DEXTROSE 2000 MG/ 100 ML SOLUTION: Performed by: ORTHOPAEDIC SURGERY

## 2023-10-16 PROCEDURE — C1713 ANCHOR/SCREW BN/BN,TIS/BN: HCPCS | Performed by: ORTHOPAEDIC SURGERY

## 2023-10-16 PROCEDURE — 25010000002 ONDANSETRON PER 1 MG: Performed by: NURSE ANESTHETIST, CERTIFIED REGISTERED

## 2023-10-16 PROCEDURE — 25010000002 EPINEPHRINE 1 MG/ML SOLUTION: Performed by: ORTHOPAEDIC SURGERY

## 2023-10-16 PROCEDURE — 25010000002 ROPIVACAINE PER 1 MG: Performed by: ORTHOPAEDIC SURGERY

## 2023-10-16 PROCEDURE — 63710000001 SENNOSIDES-DOCUSATE 8.6-50 MG TABLET: Performed by: ORTHOPAEDIC SURGERY

## 2023-10-16 PROCEDURE — A9270 NON-COVERED ITEM OR SERVICE: HCPCS | Performed by: ANESTHESIOLOGY

## 2023-10-16 PROCEDURE — 25010000002 PROCHLORPERAZINE 10 MG/2ML SOLUTION: Performed by: FAMILY MEDICINE

## 2023-10-16 PROCEDURE — 25010000002 CEFAZOLIN IN DEXTROSE 2-4 GM/100ML-% SOLUTION: Performed by: ORTHOPAEDIC SURGERY

## 2023-10-16 PROCEDURE — A9270 NON-COVERED ITEM OR SERVICE: HCPCS | Performed by: ORTHOPAEDIC SURGERY

## 2023-10-16 PROCEDURE — 63710000001 SCOPOLAMINE 1 MG/3DAYS PATCH 72 HOUR: Performed by: FAMILY MEDICINE

## 2023-10-16 PROCEDURE — 94799 UNLISTED PULMONARY SVC/PX: CPT

## 2023-10-16 PROCEDURE — 25010000002 DEXAMETHASONE PER 1 MG: Performed by: NURSE ANESTHETIST, CERTIFIED REGISTERED

## 2023-10-16 PROCEDURE — A9270 NON-COVERED ITEM OR SERVICE: HCPCS | Performed by: FAMILY MEDICINE

## 2023-10-16 PROCEDURE — 25010000002 FENTANYL CITRATE (PF) 50 MCG/ML SOLUTION: Performed by: ANESTHESIOLOGY

## 2023-10-16 PROCEDURE — 25810000003 LACTATED RINGERS PER 1000 ML: Performed by: ANESTHESIOLOGY

## 2023-10-16 PROCEDURE — 25010000002 SUGAMMADEX 200 MG/2ML SOLUTION: Performed by: NURSE ANESTHETIST, CERTIFIED REGISTERED

## 2023-10-16 PROCEDURE — C1776 JOINT DEVICE (IMPLANTABLE): HCPCS | Performed by: ORTHOPAEDIC SURGERY

## 2023-10-16 PROCEDURE — 63710000001 ACETAMINOPHEN EXTRA STRENGTH 500 MG TABLET: Performed by: ANESTHESIOLOGY

## 2023-10-16 PROCEDURE — 73560 X-RAY EXAM OF KNEE 1 OR 2: CPT

## 2023-10-16 DEVICE — CMT BONE PALACOS R HI/VISC 1X40: Type: IMPLANTABLE DEVICE | Site: KNEE | Status: FUNCTIONAL

## 2023-10-16 DEVICE — COMP FEM/KN VANGUARD INTLK CR 62.5MM NS RT: Type: IMPLANTABLE DEVICE | Site: KNEE | Status: FUNCTIONAL

## 2023-10-16 DEVICE — PAT 3PEG STD 8X31MM: Type: IMPLANTABLE DEVICE | Site: KNEE | Status: FUNCTIONAL

## 2023-10-16 DEVICE — CAP TOTL KN CMT PRIMARY: Type: IMPLANTABLE DEVICE | Site: KNEE | Status: FUNCTIONAL

## 2023-10-16 DEVICE — TRY TIB CRUC 71MM: Type: IMPLANTABLE DEVICE | Site: KNEE | Status: FUNCTIONAL

## 2023-10-16 DEVICE — IMPLANTABLE DEVICE: Type: IMPLANTABLE DEVICE | Site: KNEE | Status: FUNCTIONAL

## 2023-10-16 RX ORDER — PROMETHAZINE HYDROCHLORIDE 12.5 MG/1
25 TABLET ORAL ONCE AS NEEDED
Status: DISCONTINUED | OUTPATIENT
Start: 2023-10-16 | End: 2023-10-16 | Stop reason: HOSPADM

## 2023-10-16 RX ORDER — PROPOFOL 10 MG/ML
VIAL (ML) INTRAVENOUS AS NEEDED
Status: DISCONTINUED | OUTPATIENT
Start: 2023-10-16 | End: 2023-10-16 | Stop reason: SURG

## 2023-10-16 RX ORDER — ONDANSETRON 4 MG/1
4 TABLET, FILM COATED ORAL EVERY 6 HOURS PRN
Status: DISCONTINUED | OUTPATIENT
Start: 2023-10-16 | End: 2023-10-17 | Stop reason: HOSPADM

## 2023-10-16 RX ORDER — ONDANSETRON 2 MG/ML
INJECTION INTRAMUSCULAR; INTRAVENOUS AS NEEDED
Status: DISCONTINUED | OUTPATIENT
Start: 2023-10-16 | End: 2023-10-16 | Stop reason: SURG

## 2023-10-16 RX ORDER — CEFAZOLIN SODIUM IN 0.9 % NACL 3 G/100 ML
3 INTRAVENOUS SOLUTION, PIGGYBACK (ML) INTRAVENOUS ONCE
Status: DISCONTINUED | OUTPATIENT
Start: 2023-10-16 | End: 2023-10-16

## 2023-10-16 RX ORDER — CEFAZOLIN SODIUM 2 G/100ML
2 INJECTION, SOLUTION INTRAVENOUS ONCE
Status: COMPLETED | OUTPATIENT
Start: 2023-10-16 | End: 2023-10-16

## 2023-10-16 RX ORDER — CEFAZOLIN SODIUM 2 G/100ML
2000 INJECTION, SOLUTION INTRAVENOUS EVERY 8 HOURS
Qty: 200 ML | Refills: 0 | Status: COMPLETED | OUTPATIENT
Start: 2023-10-16 | End: 2023-10-17

## 2023-10-16 RX ORDER — OXYCODONE HYDROCHLORIDE 5 MG/1
5 TABLET ORAL
Status: DISCONTINUED | OUTPATIENT
Start: 2023-10-16 | End: 2023-10-16 | Stop reason: HOSPADM

## 2023-10-16 RX ORDER — AMOXICILLIN 250 MG
2 CAPSULE ORAL 2 TIMES DAILY
Status: DISCONTINUED | OUTPATIENT
Start: 2023-10-16 | End: 2023-10-17 | Stop reason: HOSPADM

## 2023-10-16 RX ORDER — FAMOTIDINE 20 MG/1
40 TABLET, FILM COATED ORAL DAILY
Status: DISCONTINUED | OUTPATIENT
Start: 2023-10-16 | End: 2023-10-17

## 2023-10-16 RX ORDER — ENOXAPARIN SODIUM 100 MG/ML
40 INJECTION SUBCUTANEOUS DAILY
Status: DISCONTINUED | OUTPATIENT
Start: 2023-10-17 | End: 2023-10-17 | Stop reason: HOSPADM

## 2023-10-16 RX ORDER — SODIUM CHLORIDE 0.9 % (FLUSH) 0.9 %
10 SYRINGE (ML) INJECTION AS NEEDED
Status: DISCONTINUED | OUTPATIENT
Start: 2023-10-16 | End: 2023-10-16 | Stop reason: HOSPADM

## 2023-10-16 RX ORDER — ROCURONIUM BROMIDE 10 MG/ML
INJECTION, SOLUTION INTRAVENOUS AS NEEDED
Status: DISCONTINUED | OUTPATIENT
Start: 2023-10-16 | End: 2023-10-16 | Stop reason: SURG

## 2023-10-16 RX ORDER — ONDANSETRON 2 MG/ML
4 INJECTION INTRAMUSCULAR; INTRAVENOUS ONCE AS NEEDED
Status: DISCONTINUED | OUTPATIENT
Start: 2023-10-16 | End: 2023-10-16 | Stop reason: HOSPADM

## 2023-10-16 RX ORDER — SCOLOPAMINE TRANSDERMAL SYSTEM 1 MG/1
1 PATCH, EXTENDED RELEASE TRANSDERMAL
Status: DISCONTINUED | OUTPATIENT
Start: 2023-10-16 | End: 2023-10-17 | Stop reason: HOSPADM

## 2023-10-16 RX ORDER — TRANEXAMIC ACID 10 MG/ML
1000 INJECTION, SOLUTION INTRAVENOUS ONCE
Status: DISCONTINUED | OUTPATIENT
Start: 2023-10-16 | End: 2023-10-16 | Stop reason: HOSPADM

## 2023-10-16 RX ORDER — SODIUM CHLORIDE, SODIUM LACTATE, POTASSIUM CHLORIDE, CALCIUM CHLORIDE 600; 310; 30; 20 MG/100ML; MG/100ML; MG/100ML; MG/100ML
9 INJECTION, SOLUTION INTRAVENOUS CONTINUOUS PRN
Status: DISCONTINUED | OUTPATIENT
Start: 2023-10-16 | End: 2023-10-17 | Stop reason: HOSPADM

## 2023-10-16 RX ORDER — PHENYLEPHRINE HCL IN 0.9% NACL 1 MG/10 ML
SYRINGE (ML) INTRAVENOUS AS NEEDED
Status: DISCONTINUED | OUTPATIENT
Start: 2023-10-16 | End: 2023-10-16 | Stop reason: SURG

## 2023-10-16 RX ORDER — BUPIVACAINE HYDROCHLORIDE AND EPINEPHRINE 5; 5 MG/ML; UG/ML
INJECTION, SOLUTION EPIDURAL; INTRACAUDAL; PERINEURAL
Status: COMPLETED | OUTPATIENT
Start: 2023-10-16 | End: 2023-10-16

## 2023-10-16 RX ORDER — PROCHLORPERAZINE EDISYLATE 5 MG/ML
2.5 INJECTION INTRAMUSCULAR; INTRAVENOUS EVERY 6 HOURS PRN
Status: DISCONTINUED | OUTPATIENT
Start: 2023-10-16 | End: 2023-10-17 | Stop reason: HOSPADM

## 2023-10-16 RX ORDER — LIDOCAINE HYDROCHLORIDE 20 MG/ML
INJECTION, SOLUTION EPIDURAL; INFILTRATION; INTRACAUDAL; PERINEURAL AS NEEDED
Status: DISCONTINUED | OUTPATIENT
Start: 2023-10-16 | End: 2023-10-16 | Stop reason: SURG

## 2023-10-16 RX ORDER — DEXMEDETOMIDINE HYDROCHLORIDE 100 UG/ML
INJECTION, SOLUTION INTRAVENOUS AS NEEDED
Status: DISCONTINUED | OUTPATIENT
Start: 2023-10-16 | End: 2023-10-16 | Stop reason: SURG

## 2023-10-16 RX ORDER — TRANEXAMIC ACID 10 MG/ML
1000 INJECTION, SOLUTION INTRAVENOUS ONCE
Status: COMPLETED | OUTPATIENT
Start: 2023-10-16 | End: 2023-10-16

## 2023-10-16 RX ORDER — ACETAMINOPHEN 500 MG
1000 TABLET ORAL ONCE
Status: COMPLETED | OUTPATIENT
Start: 2023-10-16 | End: 2023-10-16

## 2023-10-16 RX ORDER — CELECOXIB 100 MG/1
200 CAPSULE ORAL ONCE
Status: COMPLETED | OUTPATIENT
Start: 2023-10-16 | End: 2023-10-16

## 2023-10-16 RX ORDER — HYDROCODONE BITARTRATE AND ACETAMINOPHEN 5; 325 MG/1; MG/1
1 TABLET ORAL EVERY 6 HOURS PRN
Status: DISCONTINUED | OUTPATIENT
Start: 2023-10-16 | End: 2023-10-17 | Stop reason: HOSPADM

## 2023-10-16 RX ORDER — PROMETHAZINE HYDROCHLORIDE 25 MG/1
25 SUPPOSITORY RECTAL ONCE AS NEEDED
Status: DISCONTINUED | OUTPATIENT
Start: 2023-10-16 | End: 2023-10-16 | Stop reason: HOSPADM

## 2023-10-16 RX ORDER — ONDANSETRON 2 MG/ML
4 INJECTION INTRAMUSCULAR; INTRAVENOUS EVERY 6 HOURS PRN
Status: DISCONTINUED | OUTPATIENT
Start: 2023-10-16 | End: 2023-10-17 | Stop reason: HOSPADM

## 2023-10-16 RX ORDER — FENTANYL CITRATE 50 UG/ML
INJECTION, SOLUTION INTRAMUSCULAR; INTRAVENOUS AS NEEDED
Status: DISCONTINUED | OUTPATIENT
Start: 2023-10-16 | End: 2023-10-16 | Stop reason: SURG

## 2023-10-16 RX ORDER — HYDROCODONE BITARTRATE AND ACETAMINOPHEN 5; 325 MG/1; MG/1
1 TABLET ORAL EVERY 6 HOURS PRN
Status: DISCONTINUED | OUTPATIENT
Start: 2023-10-16 | End: 2023-10-17

## 2023-10-16 RX ORDER — SODIUM CHLORIDE 9 MG/ML
40 INJECTION, SOLUTION INTRAVENOUS AS NEEDED
Status: DISCONTINUED | OUTPATIENT
Start: 2023-10-16 | End: 2023-10-16 | Stop reason: HOSPADM

## 2023-10-16 RX ORDER — KETOROLAC TROMETHAMINE 15 MG/ML
15 INJECTION, SOLUTION INTRAMUSCULAR; INTRAVENOUS EVERY 6 HOURS SCHEDULED
Status: COMPLETED | OUTPATIENT
Start: 2023-10-16 | End: 2023-10-17

## 2023-10-16 RX ORDER — DEXAMETHASONE SODIUM PHOSPHATE 4 MG/ML
INJECTION, SOLUTION INTRA-ARTICULAR; INTRALESIONAL; INTRAMUSCULAR; INTRAVENOUS; SOFT TISSUE AS NEEDED
Status: DISCONTINUED | OUTPATIENT
Start: 2023-10-16 | End: 2023-10-16 | Stop reason: SURG

## 2023-10-16 RX ORDER — MIDAZOLAM HYDROCHLORIDE 2 MG/2ML
1 INJECTION, SOLUTION INTRAMUSCULAR; INTRAVENOUS ONCE
Status: COMPLETED | OUTPATIENT
Start: 2023-10-16 | End: 2023-10-16

## 2023-10-16 RX ORDER — FERROUS SULFATE 325(65) MG
325 TABLET ORAL
Status: DISCONTINUED | OUTPATIENT
Start: 2023-10-16 | End: 2023-10-17 | Stop reason: HOSPADM

## 2023-10-16 RX ORDER — ACETAMINOPHEN 500 MG
1000 TABLET ORAL EVERY 8 HOURS
Qty: 12 TABLET | Refills: 0 | Status: DISCONTINUED | OUTPATIENT
Start: 2023-10-16 | End: 2023-10-17 | Stop reason: HOSPADM

## 2023-10-16 RX ORDER — NALOXONE HCL 0.4 MG/ML
0.4 VIAL (ML) INJECTION
Status: DISCONTINUED | OUTPATIENT
Start: 2023-10-16 | End: 2023-10-17 | Stop reason: HOSPADM

## 2023-10-16 RX ORDER — SODIUM CHLORIDE, SODIUM LACTATE, POTASSIUM CHLORIDE, CALCIUM CHLORIDE 600; 310; 30; 20 MG/100ML; MG/100ML; MG/100ML; MG/100ML
100 INJECTION, SOLUTION INTRAVENOUS CONTINUOUS
Status: DISCONTINUED | OUTPATIENT
Start: 2023-10-16 | End: 2023-10-17 | Stop reason: HOSPADM

## 2023-10-16 RX ORDER — MAGNESIUM HYDROXIDE 1200 MG/15ML
LIQUID ORAL AS NEEDED
Status: DISCONTINUED | OUTPATIENT
Start: 2023-10-16 | End: 2023-10-16 | Stop reason: HOSPADM

## 2023-10-16 RX ADMIN — FENTANYL CITRATE 50 MCG: 50 INJECTION, SOLUTION INTRAMUSCULAR; INTRAVENOUS at 09:38

## 2023-10-16 RX ADMIN — PROPOFOL 100 MG: 10 INJECTION, EMULSION INTRAVENOUS at 09:40

## 2023-10-16 RX ADMIN — Medication 100 MCG: at 11:00

## 2023-10-16 RX ADMIN — KETOROLAC TROMETHAMINE 15 MG: 15 INJECTION, SOLUTION INTRAMUSCULAR; INTRAVENOUS at 13:38

## 2023-10-16 RX ADMIN — DOCUSATE SODIUM 50MG AND SENNOSIDES 8.6MG 2 TABLET: 8.6; 5 TABLET, FILM COATED ORAL at 21:52

## 2023-10-16 RX ADMIN — CEFAZOLIN SODIUM 2 G: 2 INJECTION, SOLUTION INTRAVENOUS at 09:44

## 2023-10-16 RX ADMIN — PROCHLORPERAZINE EDISYLATE 2.5 MG: 5 INJECTION, SOLUTION INTRAMUSCULAR; INTRAVENOUS at 14:14

## 2023-10-16 RX ADMIN — FENTANYL CITRATE 50 MCG: 50 INJECTION, SOLUTION INTRAMUSCULAR; INTRAVENOUS at 09:26

## 2023-10-16 RX ADMIN — ROCURONIUM BROMIDE 40 MG: 50 INJECTION INTRAVENOUS at 09:40

## 2023-10-16 RX ADMIN — CELECOXIB 200 MG: 100 CAPSULE ORAL at 08:35

## 2023-10-16 RX ADMIN — Medication 100 MCG: at 09:58

## 2023-10-16 RX ADMIN — SUGAMMADEX 200 MG: 100 INJECTION, SOLUTION INTRAVENOUS at 11:00

## 2023-10-16 RX ADMIN — Medication 100 MCG: at 09:50

## 2023-10-16 RX ADMIN — MIDAZOLAM HYDROCHLORIDE 1 MG: 1 INJECTION, SOLUTION INTRAMUSCULAR; INTRAVENOUS at 08:45

## 2023-10-16 RX ADMIN — TRANEXAMIC ACID 1000 MG: 10 INJECTION, SOLUTION INTRAVENOUS at 08:26

## 2023-10-16 RX ADMIN — SCOPALAMINE 1 PATCH: 1 PATCH, EXTENDED RELEASE TRANSDERMAL at 15:40

## 2023-10-16 RX ADMIN — HYDROMORPHONE HYDROCHLORIDE 0.5 MG: 1 INJECTION, SOLUTION INTRAMUSCULAR; INTRAVENOUS; SUBCUTANEOUS at 11:34

## 2023-10-16 RX ADMIN — FENTANYL CITRATE 50 MCG: 50 INJECTION, SOLUTION INTRAMUSCULAR; INTRAVENOUS at 10:14

## 2023-10-16 RX ADMIN — LIDOCAINE HYDROCHLORIDE 60 MG: 20 INJECTION, SOLUTION EPIDURAL; INFILTRATION; INTRACAUDAL; PERINEURAL at 09:38

## 2023-10-16 RX ADMIN — HYDROCODONE BITARTRATE AND ACETAMINOPHEN 1 TABLET: 5; 325 TABLET ORAL at 21:55

## 2023-10-16 RX ADMIN — DEXMEDETOMIDINE 10 MCG: 100 INJECTION, SOLUTION INTRAVENOUS at 10:23

## 2023-10-16 RX ADMIN — CEFAZOLIN SODIUM 2000 MG: 2 INJECTION, SOLUTION INTRAVENOUS at 17:03

## 2023-10-16 RX ADMIN — BUPIVACAINE HYDROCHLORIDE AND EPINEPHRINE BITARTRATE 30 ML: 5; .005 INJECTION, SOLUTION EPIDURAL; INTRACAUDAL; PERINEURAL at 09:12

## 2023-10-16 RX ADMIN — KETOROLAC TROMETHAMINE 15 MG: 15 INJECTION, SOLUTION INTRAMUSCULAR; INTRAVENOUS at 17:05

## 2023-10-16 RX ADMIN — Medication 50 MCG: at 09:52

## 2023-10-16 RX ADMIN — DEXAMETHASONE SODIUM PHOSPHATE 4 MG: 4 INJECTION, SOLUTION INTRAMUSCULAR; INTRAVENOUS at 09:50

## 2023-10-16 RX ADMIN — FENTANYL CITRATE 50 MCG: 50 INJECTION, SOLUTION INTRAMUSCULAR; INTRAVENOUS at 09:12

## 2023-10-16 RX ADMIN — SODIUM CHLORIDE, POTASSIUM CHLORIDE, SODIUM LACTATE AND CALCIUM CHLORIDE 9 ML/HR: 600; 310; 30; 20 INJECTION, SOLUTION INTRAVENOUS at 08:27

## 2023-10-16 RX ADMIN — Medication 100 MCG: at 10:59

## 2023-10-16 RX ADMIN — SODIUM CHLORIDE, POTASSIUM CHLORIDE, SODIUM LACTATE AND CALCIUM CHLORIDE 100 ML/HR: 600; 310; 30; 20 INJECTION, SOLUTION INTRAVENOUS at 17:03

## 2023-10-16 RX ADMIN — HYDROMORPHONE HYDROCHLORIDE 0.5 MG: 1 INJECTION, SOLUTION INTRAMUSCULAR; INTRAVENOUS; SUBCUTANEOUS at 11:50

## 2023-10-16 RX ADMIN — ONDANSETRON 4 MG: 2 INJECTION INTRAMUSCULAR; INTRAVENOUS at 10:01

## 2023-10-16 RX ADMIN — ACETAMINOPHEN 1000 MG: 500 TABLET ORAL at 08:26

## 2023-10-16 NOTE — ANESTHESIA POSTPROCEDURE EVALUATION
Patient: Breanna York    Procedure Summary       Date: 10/16/23 Room / Location: [facility] OR 03 / [facility] MAIN OR    Anesthesia Start: 0934 Anesthesia Stop: 1118    Procedure: RIGHT TOTAL KNEE ARTHROPLASTY (Right: Knee) Diagnosis:       Osteoarthritis of right knee, unspecified osteoarthritis type      (Osteoarthritis of right knee, unspecified osteoarthritis type [M17.11])    Surgeons: Candice Vo MD Provider: Romaine Null MD    Anesthesia Type: general with block, ERAS Protocol ASA Status: 2            Anesthesia Type: general with block, ERAS Protocol    Vitals  Vitals Value Taken Time   /59 10/16/23 1202   Temp 36.6 °C (97.8 °F) 10/16/23 1145   Pulse 77 10/16/23 1205   Resp 16 10/16/23 1155   SpO2 91 % 10/16/23 1205   Vitals shown include unfiled device data.        Post Anesthesia Care and Evaluation    Patient location during evaluation: bedside  Patient participation: complete - patient participated  Level of consciousness: awake  Pain management: adequate    Airway patency: patent  PONV Status: none  Cardiovascular status: acceptable and stable  Respiratory status: acceptable  Hydration status: acceptable    Comments: An Anesthesiologist personally participated in the most demanding procedures (including induction and emergence if applicable) in the anesthesia plan, monitored the course of anesthesia administration at frequent intervals and remained physically present and available for immediate diagnosis and treatment of emergencies.

## 2023-10-16 NOTE — THERAPY EVALUATION
Acute Care - Physical Therapy Initial Evaluation   [place]     Patient Name: Breanna York  : 1940  MRN: [id number]  Today's Date: 10/16/2023      Visit Dx:     ICD-10-CM ICD-9-CM   1. Difficulty in walking  R26.2 719.7   2. Osteoarthritis of right knee, unspecified osteoarthritis type  M17.11 715.96     Patient Active Problem List   Diagnosis    Essential hypertension    Mixed hyperlipidemia    Vitamin D deficiency    Primary localized osteoarthritis    Bruit of right carotid artery    Cervical radiculopathy    Bilateral carpal tunnel syndrome    Postmenopausal osteoporosis    Medicare annual wellness visit, subsequent    Onychomycosis    OA (osteoarthritis) of knee     Past Medical History:   Diagnosis Date    Anemia     Arthritis     Cancer     BASAL CELL    Hyperlipemia     Hypertension     Impingement syndrome of right shoulder 2018    Limb swelling     Primary osteoarthritis of both hips 2018    Primary osteoarthritis of right knee 2018    Thyroid disorder      Past Surgical History:   Procedure Laterality Date    CATARACT EXTRACTION, BILATERAL      COLONOSCOPY      FLEXOR TENDON REPAIR      HYSTERECTOMY      KNEE CARTILAGE SURGERY Bilateral     SKIN CANCER EXCISION      CALLY    THYROID SURGERY      TUMOR REMOVED    TOTAL KNEE ARTHROPLASTY Right 10/16/2023    Procedure: RIGHT TOTAL KNEE ARTHROPLASTY;  Surgeon: Candice Vo MD;  Location: [facility];  Service: Orthopedics;  Laterality: Right;    TOTAL SHOULDER REPLACEMENT Right     ULNAR TUNNEL RELEASE      WRIST SURGERY Right     TENDON REPAIR     PT Assessment (last 12 hours)       PT Evaluation and Treatment       Row Name 10/16/23 1441          Physical Therapy Time and Intention    Subjective Information complains of;fatigue;nausea/vomiting (P)   -ZT     Document Type evaluation (P)   -ZT     Mode of Treatment individual therapy;physical therapy (P)   -ZT     Patient Effort adequate (P)   -ZT       Row Name 10/16/23 144           General Information    Patient Profile Reviewed yes (P)   -ZT     Patient Observations alert;cooperative;agree to therapy (P)   -ZT     Prior Level of Function independent:;all household mobility;ADL's (P)   -ZT     Equipment Currently Used at Home walker, rolling (P)   -ZT     Existing Precautions/Restrictions fall (P)   -ZT       Row Name 10/16/23 1441          Living Environment    Current Living Arrangements home (P)   -ZT     People in Home alone (P)   -ZT     Primary Care Provided by self (P)   -ZT       Row Name 10/16/23 1441          Home Use of Assistive/Adaptive Equipment    Equipment Currently Used at Home walker, rolling (P)   -ZT       Row Name 10/16/23 1441          Range of Motion (ROM)    Range of Motion bilateral lower extremities (P)   5-80 degrees of R knee flexion  -ZT       Row Name 10/16/23 1441          Strength (Manual Muscle Testing)    Strength (Manual Muscle Testing) other (see comments) (P)   Not tested at this time due to pt lethargy  -ZT       Row Name 10/16/23 1441          Bed Mobility    Bed Mobility bed mobility (all) activities (P)   -ZT     All Activities, Hubert (Bed Mobility) minimum assist (75% patient effort) (P)   -ZT       Row Name 10/16/23 1441          Transfers    Transfers sit-stand transfer;stand-sit transfer (P)   -ZT       Row Name 10/16/23 1441          Sit-Stand Transfer    Sit-Stand Hubert (Transfers) maximum assist (25% patient effort) (P)   -ZT     Assistive Device (Sit-Stand Transfers) walker, front-wheeled (P)   -ZT       Row Name 10/16/23 1441          Stand-Sit Transfer    Stand-Sit Hubert (Transfers) maximum assist (25% patient effort) (P)   -ZT     Assistive Device (Stand-Sit Transfers) walker, front-wheeled (P)   -ZT       Row Name 10/16/23 1441          Gait/Stairs (Locomotion)    Gait/Stairs Locomotion gait/ambulation assistive device (P)   -ZT     Hubert Level (Gait) maximum assist (25% patient effort) (P)   -ZT      Assistive Device (Gait) walker, front-wheeled (P)   -ZT     Patient was able to Ambulate yes (P)   -ZT     Distance in Feet (Gait) -- (P)   4 steps  -ZT     Pattern (Gait) step-through (P)   -ZT       Row Name 10/16/23 1441          Safety Issues, Functional Mobility    Impairments Affecting Function (Mobility) balance;coordination;endurance/activity tolerance;strength (P)   -ZT       Row Name 10/16/23 1441          Balance    Balance Assessment standing dynamic balance (P)   -ZT     Dynamic Standing Balance maximum assist (P)   -ZT     Position/Device Used, Standing Balance walker, front-wheeled (P)   -ZT       Row Name             Wound 10/16/23 1009 Right anterior knee Incision    Wound - Properties Group Placement Date: 10/16/23  -SC Placement Time: 1009  -SC Present on Original Admission: N  -SC Side: Right  -SC Orientation: anterior  -SC Location: knee  -SC Primary Wound Type: Incision  -SC    Retired Wound - Properties Group Placement Date: 10/16/23  -SC Placement Time: 1009  -SC Present on Original Admission: N  -SC Side: Right  -SC Orientation: anterior  -SC Location: knee  -SC Primary Wound Type: Incision  -SC    Retired Wound - Properties Group Date first assessed: 10/16/23  -SC Time first assessed: 1009  -SC Present on Original Admission: N  -SC Side: Right  -SC Location: knee  -SC Primary Wound Type: Incision  -SC      Row Name 10/16/23 1441          Plan of Care Review    Plan of Care Reviewed With patient (P)   -ZT     Outcome Evaluation Pt presents with decreased BLE strength, endurance, and balance. She requires skilled PT services to address these deficits so that she can safely return to a OF. (P)   -ZT       Row Name 10/16/23 1441          Therapy Assessment/Plan (PT)    Rehab Potential (PT) good, to achieve stated therapy goals (P)   -ZT     Criteria for Skilled Interventions Met (PT) yes;skilled treatment is necessary (P)   -ZT     Therapy Frequency (PT) 2 times/day (P)   -ZT     Predicted  Duration of Therapy Intervention (PT) 10 days (P)   -ZT     Problem List (PT) problems related to;balance;mobility;strength (P)   -ZT     Activity Limitations Related to Problem List (PT) unable to ambulate safely;unable to transfer safely;BADLs not performed adequately or safely;IADLs not performed adequately or safely;community activities not performed adequately or safely (P)   -ZT       Row Name 10/16/23 144          Therapy Plan Review/Discharge Plan (PT)    Therapy Plan Review (PT) care plan/treatment goals reviewed;evaluation/treatment results reviewed;participants included;patient (P)   -ZT       Row Name 10/16/23 1441          Physical Therapy Goals    Bed Mobility Goal Selection (PT) bed mobility, PT goal 1 (P)   -ZT     Transfer Goal Selection (PT) transfer, PT goal 1 (P)   -ZT     Gait Training Goal Selection (PT) gait training, PT goal 1 (P)   -ZT     Strength Goal Selection (PT) strength, PT goal 1 (P)   -ZT     Balance Goal Selection (PT) balance, PT goal 1 (P)   -ZT       Row Name 10/16/23 [facility]          Bed Mobility Goal 1 (PT)    Activity/Assistive Device (Bed Mobility Goal 1, PT) bed mobility activities, all (P)   -ZT     [place] Level/Cues Needed (Bed Mobility Goal 1, PT) independent (P)   -ZT     Time Frame (Bed Mobility Goal 1, PT) long term goal (LTG);10 days (P)   -ZT       Row Name 10/16/23 [facility]          Transfer Goal 1 (PT)    Activity/Assistive Device (Transfer Goal 1, PT) transfers, all (P)   -ZT     [place] Level/Cues Needed (Transfer Goal 1, PT) independent (P)   -ZT     Time Frame (Transfer Goal 1, PT) long term goal (LTG);10 days (P)   -ZT       Row Name 10/16/23 1441          Gait Training Goal 1 (PT)    Activity/Assistive Device (Gait Training Goal 1, PT) gait (walking locomotion) (P)   -ZT     [place] Level (Gait Training Goal 1, PT) independent (P)   -ZT     Distance (Gait Training Goal 1, PT) 200 (P)   -ZT     Time Frame (Gait Training Goal 1, PT) long term goal  (LTG);10 days (P)   -ZT       Row Name 10/16/23 1441          Strength Goal 1 (PT)    Strength Goal 1 (PT) 5/5 BLE strength (P)   -ZT     Time Frame (Strength Goal 1, PT) long term goal (LTG);10 days (P)   -ZT       Row Name 10/16/23 1441          Balance Goal 1 (PT)    [place] Level/Cues Needed (Balance Goal 1, PT) independent (P)   -ZT     Time Frame (Balance Goal 1, PT) long term goal (LTG);10 days (P)   -ZT               User Key  (r) = Recorded By, (t) = Taken By, (c) = Cosigned By      Initials Name Provider Type    SC Maryse Palacios, RN Registered Nurse    Salomón Chicas, PT Student PT Student                    Physical Therapy Education       Title: PT OT SLP Therapies (Done)       Topic: Physical Therapy (Done)       Point: Mobility training (Done)       Learning Progress Summary             Patient Acceptance, E,TB, VU by ZT at 10/16/2023 1446                         Point: Body mechanics (Done)       Learning Progress Summary             Patient Acceptance, E,TB, VU by ZT at 10/16/2023 1446                         Point: Precautions (Done)       Learning Progress Summary             Patient Acceptance, E,TB, VU by ZT at 10/16/2023 1446                                         User Key       Initials Effective Dates Name Provider Type Discipline    ZT 09/05/23 -  Salomón Varela, PT Student PT Student PT                  PT Recommendation and Plan  Anticipated Discharge Disposition (PT): (P) inpatient rehabilitation facility  Planned Therapy Interventions (PT): (P) balance training, bed mobility training, gait training, stair training, strengthening, transfer training  Therapy Frequency (PT): (P) 2 times/day  Plan of Care Reviewed With: (P) patient  Outcome Evaluation: (P) Pt presents with decreased BLE strength, endurance, and balance. She requires skilled PT services to address these deficits so that she can safely return to a PLOF.   Outcome Measures       Row Name 10/16/23 1400              How much help from another person do you currently need...    Turning from your back to your side while in flat bed without using bedrails? 3 (P)   -ZT      Moving from lying on back to sitting on the side of a flat bed without bedrails? 3 (P)   -ZT      Moving to and from a bed to a chair (including a wheelchair)? 2 (P)   -ZT      Standing up from a chair using your arms (e.g., wheelchair, bedside chair)? 2 (P)   -ZT      Climbing 3-5 steps with a railing? 1 (P)   -ZT      To walk in hospital room? 1 (P)   -ZT      AM-PAC 6 Clicks Score (PT) 12 (P)   -ZT      Highest level of mobility 4 --> Transferred to chair/commode (P)   -ZT                User Key  (r) = Recorded By, (t) = Taken By, (c) = Cosigned By      Initials Name Provider Type    ZT Salomón Varela, PT Student PT Student                     Time Calculation:    PT Charges       Row Name 10/16/23 1441             Time Calculation    PT Received On 10/16/23 (P)   -ZT      PT Goal Re-Cert Due Date 10/25/23 (P)   -ZT         Untimed Charges    PT Eval/Re-eval Minutes 25 (P)   -ZT         Total Minutes    Untimed Charges Total Minutes 25 (P)   -ZT       Total Minutes 25 (P)   -ZT                User Key  (r) = Recorded By, (t) = Taken By, (c) = Cosigned By      Initials Name Provider Type    ZT Salomón Varela, PT Student PT Student                      PT G-Codes  AM-PAC 6 Clicks Score (PT): (P) 12    Salomón Varela PT Student  10/16/2023

## 2023-10-16 NOTE — CONSULTS
[facility] HISTORY AND PHYSICAL  Date: 10/16/2023   Patient Name: Breanna York  : 1940  MRN: [id number]  Primary Care Physician:  Topher Perera MD  Date of admission: 10/16/2023    Subjective   Subjective     Chief Complaint: Postop nausea vomiting history of hypertension    HPI:    Breanna York is a 83 y.o. female past medical history significant for hypertension, primary osteoarthritis of the right knee presents following right total knee arthroplasty with postop nausea and vomiting refractory to Zofran.  Patient remains very lethargic following procedure only answering yes and no questions.  Son is at the bedside gives most of the history.  No history of postoperative nausea.  Only abdominal surgery was a hysterectomy remotely.  Of note patient not able to ambulate to the bedside chair from the [facility] postoperatively.   Preop labs reviewed and significant only for hyperlipidemia.  Patient slightly hypothermic postop and placed on Sage hugger.  Heart rate within normal limits.  Blood pressure within normal limits.  Satting well on 3 L nasal cannula.  No other issues per nursing. Patient and family hoping patient will qualify for inpatient rehab as she lives alone and is concerned about her safety on returning home with the postoperative knee.  PT OT xuan pending..       Personal History     Past Medical History:  Past Medical History:   Diagnosis Date    Anemia     Arthritis     Cancer     BASAL CELL    Hyperlipemia     Hypertension     Impingement syndrome of right shoulder 2018    Limb swelling     Primary osteoarthritis of both hips 2018    Primary osteoarthritis of right knee 2018    Thyroid disorder         Past Surgical History:  Past Surgical History:   Procedure Laterality Date    CATARACT EXTRACTION, BILATERAL      COLONOSCOPY      FLEXOR TENDON REPAIR      HYSTERECTOMY      KNEE CARTILAGE SURGERY Bilateral     SKIN CANCER EXCISION      CALLY    THYROID  SURGERY      TUMOR REMOVED    TOTAL KNEE ARTHROPLASTY Right 10/16/2023    Procedure: RIGHT TOTAL KNEE ARTHROPLASTY;  Surgeon: Candice Vo MD;  Location: [facility] MAIN OR;  Service: Orthopedics;  Laterality: Right;    TOTAL SHOULDER REPLACEMENT Right     ULNAR TUNNEL RELEASE      WRIST SURGERY Right     TENDON REPAIR        Family History:   Family History   Problem Relation Age of Onset    Heart disease Mother     Cancer Mother     Arthritis Mother     Heart disease Father     Arthritis Father     Heart disease Sister     Cancer Sister     Osteoporosis Sister     Heart disease Son         Social History:   Social History     Socioeconomic History    Marital status:    Tobacco Use    Smoking status: Never    Smokeless tobacco: Never   Vaping Use    Vaping Use: Never used   Substance and Sexual Activity    Alcohol use: Never    Drug use: Never    Sexual activity: Defer        Home Medications:  Biotin, Cholecalciferol, Diclofenac Sodium, HYDROcodone-acetaminophen, amLODIPine, diclofenac, and gabapentin    Allergies:  Allergies   Allergen Reactions    Ace Inhibitors Angioedema     This occurred after some insect bites, but still need to defer this class of medications going forward    Meperidine Hallucinations    Latex Rash       Review of Systems   Constitutional: Negative for fatigue and fever.   HENT: Negative for sore throat and trouble swallowing.    Eyes: Negative for pain and discharge.   Respiratory: Negative for cough and shortness of breath.    Cardiovascular: Negative for chest pain and palpitations.   Gastrointestinal: Negative for abdominal pain, positive nausea and vomiting.   Endocrine: Negative for cold intolerance and heat intolerance.   Genitourinary: Negative for difficulty urinating and dysuria.   Musculoskeletal: Negative for back pain and neck stiffness.   Skin: Negative for color change and rash.   Neurological: Negative for syncope and headaches.   Hematological: Negative for  adenopathy.   Psychiatric/Behavioral: Negative for confusion and hallucinations.    Objective   Objective     Vitals:   Temp:  [94.8 °F (34.9 °C)-98.9 °F (37.2 °C)] 95.1 °F (35.1 °C)  Heart Rate:  [] 66  Resp:  [14-18] 16  BP: (109-162)/(48-86) 112/63  Flow (L/min):  [3-4] 3  FiO2 (%):  [48 %-100 %] 100 %    Physical Exam   Gen. well-developed appearing stated age in no acute distress  HEENT: Normocephalic atraumatic moist membranes pupils equal round reactive light, no scleral icterus no conjunctival injection  Cardiovascular: regular rate and rhythm no murmurs rubs or gallops S1-S2, no lower extremity edema appreciated  Pulmonary: Clear to auscultation bilaterally no wheezes rales or rhonchi symmetric chest expansion, unlabored, no conversational dyspnea appreciated  Gastrointestinal: Soft nontender nondistended positive bowel sounds all 4 quadrants no rebound or guarding  Musculoskeletal: No clubbing cyanosis, warm and well-perfused, calves soft symmetric nontender bilaterally, surgical dressing clean dry intact over the right knee  Skin: Clean dry without rashes  Neuro: Cranial nerves II through XII intact grossly no sensorimotor deficits appreciated bilateral upper and lower extremities  Psych: Patient is lethargic but cooperative and appropriate with exam not responding to internal stimuli  : No Aguilera catheter no bladder distention no suprapubic tenderness    Result Review    Result Review:  I have personally reviewed the results from the time of this admission to 10/16/2023 14:28 EDT and agree with these findings:  [x]  Laboratory  LAB RESULTS:                              Brief Urine Lab Results       None          Microbiology Results (last 10 days)       ** No results found for the last 240 hours. **            []  Microbiology  [x]  Radiology  XR Knee 1 or 2 View Right    Result Date: 10/16/2023   Status post total knee arthroplasty in near anatomic alignment, with no evidence of immediate  complication.      LATRELL HERRERA MD       Electronically Signed and Approved By: LATRELL HERRERA MD on 10/16/2023 at 11:52              []  EKG/Telemetry   []  Cardiology/Vascular   []  Pathology  []  Old records  [x]  Other:  Scheduled Meds:acetaminophen, 1,000 mg, Oral, Q8H  ceFAZolin, 2,000 mg, Intravenous, Q8H  [START ON 10/17/2023] enoxaparin, 40 mg, Subcutaneous, Daily  famotidine, 40 mg, Oral, Daily  ferrous sulfate, 325 mg, Oral, Daily With Breakfast  ketorolac, 15 mg, Intravenous, Q6H  Scopolamine, 1 patch, Transdermal, Q72H  senna-docusate sodium, 2 tablet, Oral, BID      Continuous Infusions:lactated ringers, 9 mL/hr, Last Rate: 9 mL/hr (10/16/23 0934)  lactated ringers, 100 mL/hr, Last Rate: 100 mL/hr (10/16/23 1303)      PRN Meds:.  HYDROcodone-acetaminophen    HYDROcodone-acetaminophen    lactated ringers    magnesium hydroxide    Morphine **AND** naloxone    ondansetron **OR** ondansetron    prochlorperazine        Assessment & Plan   Assessment / Plan     Assessment/Plan:   Postop nausea vomiting  Primary osteoarthritis right knee status post right total knee arthroplasty  History of hypertension  Hyperlipidemia        Patient admitted for further evaluation and treatment  Orthopedics consulted thank you for your recommendations  Continue as needed Zofran with Compazine for breakthrough nausea  Start scopolamine patch  Initiate total joint protocol  Continue pain control with oral analgesics IV for breakthrough  Encourage early activity  Encourage incentive spirometry  Physical therapy occupational therapy consulted to evaluate and treat  Antibiotics and dressing changes per orthopedics  Continue to monitor blood pressure and restart antihypertensives as needed  Patient to follow-up with primary care provider to discuss risk and benefits of treatment of hyperlipidemia considering patient's age and comorbidities.  DVT prophylaxis with SCDs we will transition to chemical prophylaxis postop day 1  with plan of discharging on oral DOAC  Further inpatient orders recommendations pending clinical course      Discussed case with patient's nurse at bedside.    Disposition: Patient and family hoping patient will qualify for inpatient rehab as she lives at home.  Pending PT OT xuan      DVT prophylaxis:  Medical and mechanical DVT prophylaxis orders are present.    CODE STATUS:    Code Status (Patient has no pulse and is not breathing): CPR (Attempt to Resuscitate)  Medical Interventions (Patient has pulse or is breathing): Full Support

## 2023-10-16 NOTE — ANESTHESIA PREPROCEDURE EVALUATION
Anesthesia Evaluation     Patient summary reviewed and Nursing notes reviewed   no history of anesthetic complications:   NPO Solid Status: > 8 hours  NPO Liquid Status: > 2 hours           Airway   Mallampati: II  TM distance: >3 FB  Neck ROM: full  No difficulty expected  Dental      Pulmonary - negative pulmonary ROS and normal exam    breath sounds clear to auscultation  Cardiovascular - normal exam  Exercise tolerance: good (4-7 METS)    Rhythm: regular  Rate: normal    (+) hypertension, hyperlipidemia      Neuro/Psych- negative ROS  GI/Hepatic/Renal/Endo    (+) thyroid problem     Musculoskeletal     Abdominal    Substance History      OB/GYN          Other   arthritis,     ROS/Med Hx Other: TOTAL JOINT PATIENT. HX HTN, HLD.  REVIEW EKG METS>4. NO CP/SOA. ELM               Anesthesia Plan    ASA 2     general with block and ERAS Protocol     (Patient understands anesthesia not responsible for dental damage. Regional anesthesia options discussed with patient. Pt accepts regional block.)  intravenous induction     Anesthetic plan, risks, benefits, and alternatives have been provided, discussed and informed consent has been obtained with: patient.    Plan discussed with CRNA.    CODE STATUS:

## 2023-10-16 NOTE — PLAN OF CARE
Goal Outcome Evaluation:  Plan of Care Reviewed With: (P) patient           Outcome Evaluation: (P) Pt presents with decreased BLE strength, endurance, and balance. She requires skilled PT services to address these deficits so that she can safely return to a PLOF.      Anticipated Discharge Disposition (PT): (P) inpatient rehabilitation facility

## 2023-10-16 NOTE — PLAN OF CARE
Problem: Adult Inpatient Plan of Care  Goal: Plan of Care Review  Outcome: Ongoing, Progressing  Goal: Patient-Specific Goal (Individualized)  Outcome: Ongoing, Progressing  Goal: Absence of Hospital-Acquired Illness or Injury  Outcome: Ongoing, Progressing  Intervention: Identify and Manage Fall Risk  Recent Flowsheet Documentation  Taken 10/16/2023 1441 by Luisa Lozano RN  Safety Promotion/Fall Prevention: safety round/check completed  Taken 10/16/2023 1215 by Luisa Lozano RN  Safety Promotion/Fall Prevention: safety round/check completed  Intervention: Prevent Skin Injury  Recent Flowsheet Documentation  Taken 10/16/2023 1215 by Luisa Lozano RN  Body Position: legs elevated  Skin Protection:   adhesive use limited   tubing/devices free from skin contact  Intervention: Prevent and Manage VTE (Venous Thromboembolism) Risk  Recent Flowsheet Documentation  Taken 10/16/2023 1215 by Luisa Lozano RN  Activity Management:   ambulated in room   up in chair  VTE Prevention/Management:   bilateral   compression stockings on  Range of Motion: active ROM (range of motion) encouraged  Intervention: Prevent Infection  Recent Flowsheet Documentation  Taken 10/16/2023 1215 by Luisa Lozano RN  Infection Prevention:   environmental surveillance performed   hand hygiene promoted   rest/sleep promoted   single patient room provided   visitors restricted/screened  Goal: Optimal Comfort and Wellbeing  Outcome: Ongoing, Progressing  Intervention: Monitor Pain and Promote Comfort  Recent Flowsheet Documentation  Taken 10/16/2023 1215 by Luisa Lozano RN  Pain Management Interventions:   see MAR   quiet environment facilitated   care clustered   cold applied  Intervention: Provide Person-Centered Care  Recent Flowsheet Documentation  Taken 10/16/2023 1215 by Luisa Lozano RN  Trust Relationship/Rapport:   care explained   emotional support provided   questions answered   thoughts/feelings  acknowledged  Goal: Readiness for Transition of Care  Outcome: Ongoing, Progressing  Intervention: Mutually Develop Transition Plan  Recent Flowsheet Documentation  Taken 10/16/2023 1338 by Luisa Lozano RN  Transportation Anticipated: family or friend will provide  Patient/Family Anticipated Services at Transition:   home health care   rehabilitation services  Patient/Family Anticipates Transition to: home with family  Taken 10/16/2023 1335 by Luisa Lozano RN  Equipment Currently Used at Home: walker, rolling     Problem: Skin Injury Risk Increased  Goal: Skin Health and Integrity  Outcome: Ongoing, Progressing  Intervention: Optimize Skin Protection  Recent Flowsheet Documentation  Taken 10/16/2023 1215 by Luisa Lozano RN  Pressure Reduction Techniques:   sit time limited to 2 hours   weight shift assistance provided  Head of Bed (HOB) Positioning: HOB elevated  Pressure Reduction Devices:   pressure-redistributing mattress utilized   positioning supports utilized   heel offloading device utilized  Skin Protection:   adhesive use limited   tubing/devices free from skin contact     Problem: Hypertension Comorbidity  Goal: Blood Pressure in Desired Range  Outcome: Ongoing, Progressing  Intervention: Maintain Blood Pressure Management  Recent Flowsheet Documentation  Taken 10/16/2023 1441 by Luisa Lozano RN  Medication Review/Management: medications reviewed  Taken 10/16/2023 1215 by Luisa Lozano RN  Medication Review/Management: medications reviewed     Problem: Fall Injury Risk  Goal: Absence of Fall and Fall-Related Injury  Outcome: Ongoing, Progressing  Intervention: Identify and Manage Contributors  Recent Flowsheet Documentation  Taken 10/16/2023 1441 by Luisa Lozano RN  Medication Review/Management: medications reviewed  Taken 10/16/2023 1215 by Luisa Lozano RN  Medication Review/Management: medications reviewed  Intervention: Promote Injury-Free Environment  Recent Flowsheet  Documentation  Taken 10/16/2023 1441 by Luisa Lozano RN  Safety Promotion/Fall Prevention: safety round/check completed  Taken 10/16/2023 1215 by Luisa Lozano RN  Safety Promotion/Fall Prevention: safety round/check completed     Problem: Pain Acute  Goal: Acceptable Pain Control and Functional Ability  Outcome: Ongoing, Progressing  Intervention: Prevent or Manage Pain  Recent Flowsheet Documentation  Taken 10/16/2023 1441 by Luisa Lozano RN  Medication Review/Management: medications reviewed  Taken 10/16/2023 1215 by Luisa Lozano RN  Medication Review/Management: medications reviewed  Intervention: Develop Pain Management Plan  Recent Flowsheet Documentation  Taken 10/16/2023 1215 by Luisa Lozano RN  Pain Management Interventions:   see MAR   quiet environment facilitated   care clustered   cold applied  Intervention: Optimize Psychosocial Wellbeing  Recent Flowsheet Documentation  Taken 10/16/2023 1215 by Luisa Lozano RN  Diversional Activities: television     Problem: Adjustment to Surgery (Hip Arthroplasty)  Goal: Optimal Coping  Outcome: Ongoing, Progressing     Problem: Bleeding (Hip Arthroplasty)  Goal: Absence of Bleeding  Outcome: Ongoing, Progressing  Intervention: Monitor and Manage Bleeding  Recent Flowsheet Documentation  Taken 10/16/2023 1215 by Luisa Lozano RN  Bleeding Management: dressing monitored     Problem: Bowel Motility Impaired (Hip Arthroplasty)  Goal: Effective Bowel Elimination  Outcome: Ongoing, Progressing     Problem: Fluid and Electrolyte Imbalance (Hip Arthroplasty)  Goal: Fluid and Electrolyte Balance  Outcome: Ongoing, Progressing     Problem: Functional Ability Impaired (Hip Arthroplasty)  Goal: Optimal Functional Ability  Outcome: Ongoing, Progressing  Intervention: Promote Optimal Functional Status  Recent Flowsheet Documentation  Taken 10/16/2023 1215 by Luisa Lozano RN  Activity Management:   ambulated in room   up in chair  Assistive  Device Utilized:   gait belt   walker     Problem: Infection (Hip Arthroplasty)  Goal: Absence of Infection Signs and Symptoms  Outcome: Ongoing, Progressing     Problem: Neurovascular Compromise (Hip Arthroplasty)  Goal: Intact Neurovascular Status  Outcome: Ongoing, Progressing     Problem: Ongoing Anesthesia Effects (Hip Arthroplasty)  Goal: Anesthesia/Sedation Recovery  Outcome: Ongoing, Progressing  Intervention: Optimize Anesthesia Recovery  Recent Flowsheet Documentation  Taken 10/16/2023 1735 by Luisa Lozano RN  Administration (IS): unable to perform  Taken 10/16/2023 1535 by Luisa Lozano RN  Administration (IS): unable to perform  Taken 10/16/2023 1441 by Luisa Lozano RN  Safety Promotion/Fall Prevention: safety round/check completed  Taken 10/16/2023 1400 by Luisa Lozano RN  Administration (IS): (pt sleeping) unable to perform  Taken 10/16/2023 1215 by Luisa Lozano RN  Safety Promotion/Fall Prevention: safety round/check completed     Problem: Pain (Hip Arthroplasty)  Goal: Acceptable Pain Control  Outcome: Ongoing, Progressing  Intervention: Prevent or Manage Pain  Recent Flowsheet Documentation  Taken 10/16/2023 1215 by Luisa Lozano RN  Pain Management Interventions:   see MAR   quiet environment facilitated   care clustered   cold applied  Diversional Activities: television     Problem: Postoperative Nausea and Vomiting (Hip Arthroplasty)  Goal: Nausea and Vomiting Relief  Outcome: Ongoing, Progressing  Intervention: Prevent or Manage Nausea and Vomiting  Recent Flowsheet Documentation  Taken 10/16/2023 1441 by Luisa Lozano RN  Nausea/Vomiting Interventions:   see MAR   sips clear liquids given  Taken 10/16/2023 1215 by Luisa Lozano RN  Nausea/Vomiting Interventions:   see MAR   sips clear liquids given     Problem: Postoperative Urinary Retention (Hip Arthroplasty)  Goal: Effective Urinary Elimination  Outcome: Ongoing, Progressing     Problem: Respiratory  Compromise (Hip Arthroplasty)  Goal: Effective Oxygenation and Ventilation  Outcome: Ongoing, Progressing  Intervention: Optimize Oxygenation and Ventilation  Recent Flowsheet Documentation  Taken 10/16/2023 1215 by Luisa Lozano, RN  Head of Bed (HOB) Positioning: HOB elevated   Goal Outcome Evaluation:   Pt has had no new changes throughout shift and continues to remain stable. Pt had right TKA performed this morning by Dr. Vo. Pt has had no complaints of pain since arriving to floor. Pt is oriented x4. Pt is x2 assist with walker and gait belt to [facility]. Pt has not yet voided d/t arousability. Pt was placed on bear hugger d/t low temps since arriving from pacu. Temps has since returned to normal limits. Pt is on 3 L NC with . Pt plans to DC home tomorrow with [facility] home health. Pt has no DME needs at this time. Pt's sons will be transportation. Pt will use meds to bed.

## 2023-10-16 NOTE — ANESTHESIA PROCEDURE NOTES
Peripheral Block      Patient reassessed immediately prior to procedure    Patient location during procedure: pre-op  Start time: 10/16/2023 9:12 AM  Stop time: 10/16/2023 9:14 AM  Reason for block: at surgeon's request and post-op pain management  Preanesthetic Checklist  Completed: patient identified, IV checked, site marked, risks and benefits discussed, surgical consent, monitors and equipment checked, pre-op evaluation and timeout performed  Prep:  Pt Position: supine  Sterile barriers:alcohol skin prep, partial drape, cap, washed/disinfected hands, mask and gloves  Prep: ChloraPrep  Patient monitoring: blood pressure monitoring, continuous pulse oximetry and EKG  Procedure    Sedation: yes  Performed under: local infiltration  Guidance:ultrasound guided    ULTRASOUND INTERPRETATION.  Using ultrasound guidance a 20 G gauge needle was placed in close proximity to the nerve, at which point, under ultrasound guidance anesthetic was injected in the area of the nerve and spread of the anesthesia was seen on ultrasound in close proximity thereto.  There were no abnormalities seen on ultrasound; a digital image was taken; and the patient tolerated the procedure with no complications. Images:still images obtained, printed/placed on chart    Laterality:right  Block Type:adductor canal block  Injection Technique:single-shot  Needle Type:echogenic  Needle Gauge:20 G (4in)  Resistance on Injection: none    Medications Used: bupivacaine-EPINEPHrine PF (MARCAINE w/EPI) 0.5% -[id number] injection - Injection   30 mL - 10/16/2023 9:12:00 AM      Post Assessment  Injection Assessment: negative aspiration for heme, no paresthesia on injection and incremental injection  Patient Tolerance:comfortable throughout block  Complications:no  Additional Notes  The block or continuous infusion is requested by the referring physician for management of postoperative pain, or pain related to a procedure. Ultrasound guidance (deemed medically  necessary). Painless injection, pt was awake and conversant during the procedure without complications. Needle and surrounding structures visualized throughout procedure. No adverse reactions or complications seen during this period. Post-procedure image showed no signs of complication, and anatomy was consistent with an uncomplicated nerve blockade.

## 2023-10-17 VITALS
HEART RATE: 72 BPM | BODY MASS INDEX: 23.49 KG/M2 | OXYGEN SATURATION: 97 % | HEIGHT: 62 IN | WEIGHT: 127.65 LBS | DIASTOLIC BLOOD PRESSURE: 67 MMHG | TEMPERATURE: 97.9 F | SYSTOLIC BLOOD PRESSURE: 132 MMHG | RESPIRATION RATE: 16 BRPM

## 2023-10-17 LAB
ANION GAP SERPL CALCULATED.3IONS-SCNC: 8.3 MMOL/L (ref 5–15)
BUN SERPL-MCNC: 14 MG/DL (ref 8–23)
BUN/CREAT SERPL: 16.5 (ref 7–25)
CALCIUM SPEC-SCNC: 8.6 MG/DL (ref 8.6–10.5)
CHLORIDE SERPL-SCNC: 103 MMOL/L (ref 98–107)
CO2 SERPL-SCNC: 25.7 MMOL/L (ref 22–29)
CREAT SERPL-MCNC: 0.85 MG/DL (ref 0.57–1)
EGFRCR SERPLBLD CKD-EPI 2021: 68.1 ML/MIN/1.73
GLUCOSE SERPL-MCNC: 108 MG/DL (ref 65–99)
HCT VFR BLD AUTO: 29.8 % (ref 34–46.6)
HGB BLD-MCNC: 9.6 G/DL (ref 12–15.9)
POTASSIUM SERPL-SCNC: 3.8 MMOL/L (ref 3.5–5.2)
SODIUM SERPL-SCNC: 137 MMOL/L (ref 136–145)

## 2023-10-17 PROCEDURE — 63710000001 FERROUS SULFATE 325 (65 FE) MG TABLET: Performed by: ORTHOPAEDIC SURGERY

## 2023-10-17 PROCEDURE — A9270 NON-COVERED ITEM OR SERVICE: HCPCS | Performed by: ORTHOPAEDIC SURGERY

## 2023-10-17 PROCEDURE — 97150 GROUP THERAPEUTIC PROCEDURES: CPT

## 2023-10-17 PROCEDURE — 94799 UNLISTED PULMONARY SVC/PX: CPT

## 2023-10-17 PROCEDURE — 80048 BASIC METABOLIC PNL TOTAL CA: CPT | Performed by: ORTHOPAEDIC SURGERY

## 2023-10-17 PROCEDURE — 97116 GAIT TRAINING THERAPY: CPT

## 2023-10-17 PROCEDURE — 25010000002 CEFAZOLIN IN DEXTROSE 2-4 GM/100ML-% SOLUTION: Performed by: ORTHOPAEDIC SURGERY

## 2023-10-17 PROCEDURE — 63710000001 FAMOTIDINE 20 MG TABLET: Performed by: ORTHOPAEDIC SURGERY

## 2023-10-17 PROCEDURE — 85018 HEMOGLOBIN: CPT | Performed by: ORTHOPAEDIC SURGERY

## 2023-10-17 PROCEDURE — 63710000001 ACETAMINOPHEN EXTRA STRENGTH 500 MG TABLET: Performed by: ORTHOPAEDIC SURGERY

## 2023-10-17 PROCEDURE — 63710000001 SENNOSIDES-DOCUSATE 8.6-50 MG TABLET: Performed by: ORTHOPAEDIC SURGERY

## 2023-10-17 PROCEDURE — 97165 OT EVAL LOW COMPLEX 30 MIN: CPT

## 2023-10-17 PROCEDURE — 85014 HEMATOCRIT: CPT | Performed by: ORTHOPAEDIC SURGERY

## 2023-10-17 PROCEDURE — 63710000001 HYDROCODONE-ACETAMINOPHEN 5-325 MG TABLET: Performed by: ORTHOPAEDIC SURGERY

## 2023-10-17 PROCEDURE — 25010000002 KETOROLAC TROMETHAMINE PER 15 MG: Performed by: ORTHOPAEDIC SURGERY

## 2023-10-17 PROCEDURE — 25010000002 ENOXAPARIN PER 10 MG: Performed by: ORTHOPAEDIC SURGERY

## 2023-10-17 PROCEDURE — 63710000001 CYCLOBENZAPRINE 10 MG TABLET: Performed by: ORTHOPAEDIC SURGERY

## 2023-10-17 PROCEDURE — 97535 SELF CARE MNGMENT TRAINING: CPT

## 2023-10-17 RX ORDER — CYCLOBENZAPRINE HCL 10 MG
10 TABLET ORAL ONCE
Status: COMPLETED | OUTPATIENT
Start: 2023-10-17 | End: 2023-10-17

## 2023-10-17 RX ORDER — HYDROCODONE BITARTRATE AND ACETAMINOPHEN 5; 325 MG/1; MG/1
1-2 TABLET ORAL EVERY 4 HOURS PRN
Qty: 40 TABLET | Refills: 0 | Status: SHIPPED | OUTPATIENT
Start: 2023-10-17

## 2023-10-17 RX ORDER — FAMOTIDINE 20 MG/1
20 TABLET, FILM COATED ORAL DAILY
Status: DISCONTINUED | OUTPATIENT
Start: 2023-10-18 | End: 2023-10-17 | Stop reason: HOSPADM

## 2023-10-17 RX ORDER — HYDROCODONE BITARTRATE AND ACETAMINOPHEN 5; 325 MG/1; MG/1
2 TABLET ORAL EVERY 6 HOURS PRN
Status: DISCONTINUED | OUTPATIENT
Start: 2023-10-17 | End: 2023-10-17 | Stop reason: HOSPADM

## 2023-10-17 RX ADMIN — ENOXAPARIN SODIUM 40 MG: 100 INJECTION SUBCUTANEOUS at 09:40

## 2023-10-17 RX ADMIN — HYDROCODONE BITARTRATE AND ACETAMINOPHEN 1 TABLET: 5; 325 TABLET ORAL at 06:52

## 2023-10-17 RX ADMIN — FERROUS SULFATE TAB 325 MG (65 MG ELEMENTAL FE) 325 MG: 325 (65 FE) TAB at 09:39

## 2023-10-17 RX ADMIN — CYCLOBENZAPRINE 10 MG: 10 TABLET, FILM COATED ORAL at 16:48

## 2023-10-17 RX ADMIN — ACETAMINOPHEN 1000 MG: 500 TABLET ORAL at 16:47

## 2023-10-17 RX ADMIN — CEFAZOLIN SODIUM 2000 MG: 2 INJECTION, SOLUTION INTRAVENOUS at 00:13

## 2023-10-17 RX ADMIN — FAMOTIDINE 40 MG: 20 TABLET, FILM COATED ORAL at 09:39

## 2023-10-17 RX ADMIN — KETOROLAC TROMETHAMINE 15 MG: 15 INJECTION, SOLUTION INTRAMUSCULAR; INTRAVENOUS at 06:21

## 2023-10-17 RX ADMIN — ACETAMINOPHEN 1000 MG: 500 TABLET ORAL at 09:39

## 2023-10-17 RX ADMIN — HYDROCODONE BITARTRATE AND ACETAMINOPHEN 1 TABLET: 5; 325 TABLET ORAL at 12:44

## 2023-10-17 RX ADMIN — KETOROLAC TROMETHAMINE 15 MG: 15 INJECTION, SOLUTION INTRAMUSCULAR; INTRAVENOUS at 00:10

## 2023-10-17 RX ADMIN — DOCUSATE SODIUM 50MG AND SENNOSIDES 8.6MG 2 TABLET: 8.6; 5 TABLET, FILM COATED ORAL at 09:39

## 2023-10-17 RX ADMIN — ACETAMINOPHEN 1000 MG: 500 TABLET ORAL at 00:10

## 2023-10-17 NOTE — PLAN OF CARE
Problem: Adult Inpatient Plan of Care  Goal: Plan of Care Review  Outcome: Met  Flowsheets (Taken 10/17/2023 1614)  Progress: improving  Plan of Care Reviewed With: patient  Outcome Evaluation: Patient alert and oriented. Patient is on room air. Pain treated per MAR. Dressing, intact with small drainage. Patient complained of muscle cramps, MD aware, and treated per MAR. Patient is to be discharged later on today.   Goal Outcome Evaluation:  Plan of Care Reviewed With: patient        Progress: improving  Outcome Evaluation: Patient alert and oriented. Patient is on room air. Pain treated per MAR. Dressing, intact with small drainage. Patient complained of muscle cramps, MD aware, and treated per MAR. Patient is to be discharged later on today.

## 2023-10-17 NOTE — THERAPY TREATMENT NOTE
Acute Care - Physical Therapy Treatment Note   [place]     Patient Name: Braenna York  : 1940  MRN: [id number]  Today's Date: 10/17/2023 Gait- individual; ther- ex -group setting; 4 participants        Visit Dx:     ICD-10-CM ICD-9-CM   1. Difficulty in walking  R26.2 719.7   2. Osteoarthritis of right knee, unspecified osteoarthritis type  M17.11 715.96   3. Decreased activities of daily living (ADL)  Z78.9 V49.89     Patient Active Problem List   Diagnosis    Essential hypertension    Mixed hyperlipidemia    Vitamin D deficiency    Primary localized osteoarthritis    Bruit of right carotid artery    Cervical radiculopathy    Bilateral carpal tunnel syndrome    Postmenopausal osteoporosis    Medicare annual wellness visit, subsequent    Onychomycosis    OA (osteoarthritis) of knee     Past Medical History:   Diagnosis Date    Anemia     Arthritis     Cancer     BASAL CELL    Hyperlipemia     Hypertension     Impingement syndrome of right shoulder 2018    Limb swelling     Primary osteoarthritis of both hips 2018    Primary osteoarthritis of right knee 2018    Thyroid disorder      Past Surgical History:   Procedure Laterality Date    CATARACT EXTRACTION, BILATERAL      COLONOSCOPY      FLEXOR TENDON REPAIR      HYSTERECTOMY      KNEE CARTILAGE SURGERY Bilateral     SKIN CANCER EXCISION      CALLY    THYROID SURGERY      TUMOR REMOVED    TOTAL KNEE ARTHROPLASTY Right 10/16/2023    Procedure: RIGHT TOTAL KNEE ARTHROPLASTY;  Surgeon: Candice Vo MD;  Location: [facility] MAIN OR;  Service: Orthopedics;  Laterality: Right;    TOTAL SHOULDER REPLACEMENT Right     ULNAR TUNNEL RELEASE      WRIST SURGERY Right     TENDON REPAIR     PT Assessment (last 12 hours)       PT Evaluation and Treatment       Row Name 10/17/23 1459 10/17/23 1400       Physical Therapy Time and Intention    Subjective Information no complaints  -RH complains of;pain  -RH    Document Type therapy note (daily note)  -RH  therapy note (daily note)  -    Mode of Treatment physical therapy;group therapy;individual therapy  - individual therapy;group therapy  -    Patient Effort good  -RH good  -RH    Comment -- Gait training performed individually; therapeutic exercises performed in a group setting with 5 participants  -[facility] Name 10/17/23 1400          General Information    Patient Observations alert;cooperative;agree to therapy  -[facility] Name 10/17/23 1400          Pain    Additional Documentation Pain Scale: FACES Pre/Post-Treatment (Group)  -[facility] Name 10/17/23 1459 10/17/23 1400       Pain Scale: FACES Pre/Post-Treatment    Pain: FACES Scale, Pretreatment 2-->hurts little bit  - 0-->no hurt  -    Posttreatment Pain Rating 2-->hurts little bit  - 2-->hurts little bit  -RH    Pain Location - Side/Orientation Right  - Right  -    Pain Location - knee  -RH knee  -[facility] Name 10/17/23 1400          Range of Motion (ROM)    Range of Motion --  Pt R knee AAROM at 90 degrees flex and 8 degrees ext.  -[facility] Name 10/17/23 1400          Strength (Manual Muscle Testing)    Strength (Manual Muscle Testing) --  Pt R knee ext strength at 3-/5.  -[facility] Name 10/17/23 1400          Mobility    Extremity Weight-bearing Status right lower extremity  -     Right Lower Extremity (Weight-bearing Status) weight-bearing as tolerated (WBAT)  -[facility] Name 10/17/23 1459 10/17/23 1400       Transfers    Transfers sit-stand transfer;stand-sit transfer  - bed-chair transfer;sit-stand transfer  -[facility] Name 10/17/23 1459 10/17/23 1400       Bed-Chair Transfer    Bed-Chair [place] (Transfers) contact guard  - contact guard  -    Assistive Device (Bed-Chair Transfers) walker, front-wheeled  - walker, front-wheeled  -      Row Name 10/17/23 1459 10/17/23 1400       Sit-Stand Transfer    Sit-Stand [place] (Transfers) contact guard  - contact guard  -    Assistive Device  (Sit-Stand Transfers) walker, front-wheeled  -RH walker, front-wheeled  -RH      Row Name 10/17/23 1459 10/17/23 1400       Stand-Sit Transfer    Stand-Sit [place] (Transfers) contact guard  -RH contact guard  -RH    Assistive Device (Stand-Sit Transfers) walker, front-wheeled  -RH walker, front-wheeled  -RH      Row Name 10/17/23 1459 10/17/23 1400       Gait/Stairs (Locomotion)    Gait/Stairs Locomotion gait/ambulation independence;gait/ambulation assistive device;distance ambulated;gait pattern;gait deviations  -RH gait/ambulation assistive device;gait/ambulation independence  -RH    [place] Level (Gait) contact guard  -RH contact guard  -RH    Assistive Device (Gait) walker, front-wheeled  -RH --    Distance in Feet (Gait) 85  -RH 75  -RH    Pattern (Gait) 3-point;step-through  -RH 3-point;step-through  -RH    Deviations/Abnormal Patterns (Gait) base of support, narrow;stride length decreased;gait speed decreased  -RH gait speed decreased;stride length decreased;base of support, narrow  -RH    Gait Assessment/Intervention Pt amb with RW and CGA with 3 point step-through gait pattern.  -RH --    Negotiation (Stairs) -- stairs independence;stairs assistive device;handrail location;number of steps;ascending technique;descending technique  -RH    [place] Level (Stairs) -- contact guard  -RH    Handrail Location (Stairs) -- both sides  -RH    Number of Steps (Stairs) -- 5  -RH    Ascending Technique (Stairs) -- step-to-step  -RH    Descending Technique (Stairs) -- step-to-step  -RH      Row Name 10/17/23 1400          Safety Issues, Functional Mobility    Impairments Affecting Function (Mobility) balance;pain;range of motion (ROM);strength  -       Row Name 10/17/23 1459 10/17/23 1400       Balance    Balance Assessment -- standing dynamic balance  -RH    Dynamic Standing Balance contact guard  -RH contact guard  -RH    Position/Device Used, Standing Balance walker, front-wheeled  - walker,  front-wheeled  -      Row Name 10/17/23 1400          Motor Skills    Therapeutic Exercise knee;hip;ankle  -       Row Name 10/17/23 1400          Hip (Therapeutic Exercise)    Hip (Therapeutic Exercise) isometric exercises  -     Hip Isometrics (Therapeutic Exercise) right;gluteal sets;10 repetitions;2 sets  -       Row Name 10/17/23 1400          Knee (Therapeutic Exercise)    Knee (Therapeutic Exercise) isometric exercises;strengthening exercise  -     Knee Isometrics (Therapeutic Exercise) right;quad sets;10 repetitions;2 sets  -     Knee Strengthening (Therapeutic Exercise) right;heel slides;SLR (straight leg raise);SAQ (short arc quad);LAQ (long arc quad);10 repetitions;2 sets  -       Row Name 10/17/23 1400          Ankle (Therapeutic Exercise)    Ankle (Therapeutic Exercise) AROM (active range of motion)  -     Ankle AROM (Therapeutic Exercise) right;dorsiflexion;plantarflexion;10 repetitions;2 sets  -       Row Name             Wound 10/16/23 1009 Right anterior knee Incision    Wound - Properties Group Placement Date: 10/16/23  -SC Placement Time: 1009  -SC Present on Original Admission: N  -SC Side: Right  -SC Orientation: anterior  -SC Location: knee  -SC Primary Wound Type: Incision  -SC    Retired Wound - Properties Group Placement Date: 10/16/23  -SC Placement Time: 1009  -SC Present on Original Admission: N  -SC Side: Right  -SC Orientation: anterior  -SC Location: knee  -SC Primary Wound Type: Incision  -SC    Retired Wound - Properties Group Date first assessed: 10/16/23  -SC Time first assessed: 1009  -SC Present on Original Admission: N  -SC Side: Right  -SC Location: knee  -SC Primary Wound Type: Incision  -SC      Row Name 10/17/23 1400          Progress Summary (PT)    Progress Toward Functional Goals (PT) progress toward functional goals is good  -     Daily Progress Summary (PT) Pt is progressing well with their exercise program.  Will continue current plan of care.  -                User Key  (r) = Recorded By, (t) = Taken By, (c) = Cosigned By      Initials Name Provider Type    SC Maryse Palacios, RN Registered Nurse    RH De Luong PTA Physical Therapist Assistant                 Right Knee Ther-ex   Exercise  Reps  Sets    Long arc Quads   10 2   Short arc Quads  10 2   Heel Slides  10 2   Ankle Pumps  10 2   Quad sets  10 2   Glut sets  10 2   Straight leg raise  10 2          Physical Therapy Education       Title: PT OT SLP Therapies (Done)       Topic: Physical Therapy (Done)       Point: Mobility training (Done)       Learning Progress Summary             Patient Acceptance, E,TB, VU by ZT at 10/16/2023 1446                         Point: Body mechanics (Done)       Learning Progress Summary             Patient Acceptance, E,TB, VU by ZT at 10/16/2023 1446                         Point: Precautions (Done)       Learning Progress Summary             Patient Acceptance, E,TB, VU by ZT at 10/16/2023 1446                                         User Key       Initials Effective Dates Name Provider Type Discipline     09/05/23 -  Salomón Varela, PT Student PT Student PT                  PT Recommendation and Plan     Progress Summary (PT)  Progress Toward Functional Goals (PT): progress toward functional goals is good  Daily Progress Summary (PT): Pt is progressing well with their exercise program.  Will continue current plan of care.   Outcome Measures       Row Name 10/17/23 1400 10/16/23 1400          How much help from another person do you currently need...    Turning from your back to your side while in flat bed without using bedrails? 3  -RH 3  -DOUG (r) ZT (t) DOUG (c)     Moving from lying on back to sitting on the side of a flat bed without bedrails? 3  -RH 3  -DOUG (r) ZT (t) DOUG (c)     Moving to and from a bed to a chair (including a wheelchair)? 2  -RH 2  -DOUG (r) ZT (t) DOUG (c)     Standing up from a chair using your arms (e.g., wheelchair, bedside chair)? 2   -RH 2  -DOUG (r) ZT (t) DOUG (c)     Climbing 3-5 steps with a railing? 3  -RH 1  -DOUG (r) ZT (t) DOUG (c)     To walk in hospital room? 4  -RH 1  -DOUG (r) ZT (t) DOUG (c)     AM-PAC 6 Clicks Score (PT) 17  -RH 12  -DOUG (r) ZT (t)     Highest level of mobility 5 --> Static standing  -RH 4 --> Transferred to chair/commode  -DOUG (r) ZT (t)               User Key  (r) = Recorded By, (t) = Taken By, (c) = Cosigned By      Initials Name Provider Type     De uLong, PTA Physical Therapist Assistant    Gerry Gore, PT Physical Therapist    ZT Salomón Varela, PT Student PT Student                     Time Calculation:    PT Charges       Row Name 10/17/23 1459 10/17/23 1411          Time Calculation    PT Received On 10/17/23  -RH 10/17/23  -RH        Timed Charges    72981 - Gait Training Minutes  8  -RH 9  -RH     65357 - PT Therapeutic Activity Minutes 4  -RH 4  -RH        Untimed Charges    PT Group Therapy Minutes 20  -RH 30  -RH        Total Minutes    Timed Charges Total Minutes 12  -RH 13  -RH     Untimed Charges Total Minutes 20  -RH 30  -RH      Total Minutes 32  -RH 43  -RH               User Key  (r) = Recorded By, (t) = Taken By, (c) = Cosigned By      Initials Name Provider Type     De Luong PTA Physical Therapist Assistant                  Therapy Charges for Today       Code Description Service Date Service Provider Modifiers Qty    71840200023 HC GAIT TRAINING EA 15 MIN 10/17/2023 De Luong PTA GP 1    32926981870 HC PT THER PROC GROUP 10/17/2023 De Luong, PTA GP 1    95378265225 HC GAIT TRAINING EA 15 MIN 10/17/2023 De Luong, PTA GP 1    23722152443 HC PT THER PROC GROUP 10/17/2023 De Luong PTA GP 1            PT G-Codes  Outcome Measure Options: AM-PAC 6 Clicks Daily Activity (OT), Optimal Instrument  AM-PAC 6 Clicks Score (PT): 17  AM-PAC 6 Clicks Score (OT): 21    De Luong PTA  10/17/2023

## 2023-10-17 NOTE — PLAN OF CARE
Goal Outcome Evaluation:  Plan of Care Reviewed With: patient        Progress: no change  Outcome Evaluation: Patient has experienced decline in function from baseline status, presenting w/ deficits related to balance, strength, safety awareness, transfers and mobility that impede patient independence with activities of daily living.  Patient would benefit from skilled Occupational Therapy intervention to maxamize patient safety, and promote return to baseline independence.      Anticipated Discharge Disposition (OT): home with home health, home with outpatient therapy services

## 2023-10-17 NOTE — THERAPY TREATMENT NOTE
Acute Care - Physical Therapy Treatment Note   [place]     Patient Name: Breanna York  : 1940  MRN: [id number]  Today's Date: 10/17/2023      Visit Dx:     ICD-10-CM ICD-9-CM   1. Difficulty in walking  R26.2 719.7   2. Osteoarthritis of right knee, unspecified osteoarthritis type  M17.11 715.96   3. Decreased activities of daily living (ADL)  Z78.9 V49.89     Patient Active Problem List   Diagnosis    Essential hypertension    Mixed hyperlipidemia    Vitamin D deficiency    Primary localized osteoarthritis    Bruit of right carotid artery    Cervical radiculopathy    Bilateral carpal tunnel syndrome    Postmenopausal osteoporosis    Medicare annual wellness visit, subsequent    Onychomycosis    OA (osteoarthritis) of knee     Past Medical History:   Diagnosis Date    Anemia     Arthritis     Cancer     BASAL CELL    Hyperlipemia     Hypertension     Impingement syndrome of right shoulder 2018    Limb swelling     Primary osteoarthritis of both hips 2018    Primary osteoarthritis of right knee 2018    Thyroid disorder      Past Surgical History:   Procedure Laterality Date    CATARACT EXTRACTION, BILATERAL      COLONOSCOPY      FLEXOR TENDON REPAIR      HYSTERECTOMY      KNEE CARTILAGE SURGERY Bilateral     SKIN CANCER EXCISION      CALLY    THYROID SURGERY      TUMOR REMOVED    TOTAL KNEE ARTHROPLASTY Right 10/16/2023    Procedure: RIGHT TOTAL KNEE ARTHROPLASTY;  Surgeon: Candice Vo MD;  Location: [facility];  Service: Orthopedics;  Laterality: Right;    TOTAL SHOULDER REPLACEMENT Right     ULNAR TUNNEL RELEASE      WRIST SURGERY Right     TENDON REPAIR     PT Assessment (last 12 hours)       PT Evaluation and Treatment       Row Name 10/17/23 1400          Physical Therapy Time and Intention    Subjective Information complains of;pain  -RH     Document Type therapy note (daily note)  -RH     Mode of Treatment individual therapy;group therapy  -RH     Patient Effort good  -RH      Comment Gait training performed individually; therapeutic exercises performed in a group setting with 5 participants  -[facility] Name 10/17/23 1400          General Information    Patient Observations alert;cooperative;agree to therapy  -[facility] Name 10/17/23 1400          Pain    Additional Documentation Pain Scale: FACES Pre/Post-Treatment (Group)  -[facility] Name 10/17/23 1400          Pain Scale: FACES Pre/Post-Treatment    Pain: FACES Scale, Pretreatment 0-->no hurt  -RH     Posttreatment Pain Rating 2-->hurts little bit  -     Pain Location - Side/Orientation Right  -     Pain Location - knee  -[facility] Name 10/17/23 1400          Range of Motion (ROM)    Range of Motion --  Pt R knee AAROM at 90 degrees flex and 8 degrees ext.  -[facility] Name 10/17/23 1400          Strength (Manual Muscle Testing)    Strength (Manual Muscle Testing) --  Pt R knee ext strength at 3-/5.  -RH       Row Name 10/17/23 1400          Mobility    Extremity Weight-bearing Status right lower extremity  -     Right Lower Extremity (Weight-bearing Status) weight-bearing as tolerated (WBAT)  -RH       Row Name 10/17/23 1400          Transfers    Transfers bed-chair transfer;sit-stand transfer  -RH       Row Name 10/17/23 1400          Bed-Chair Transfer    Bed-Chair Campbell (Transfers) contact guard  -     Assistive Device (Bed-Chair Transfers) walker, front-wheeled  -[facility] Name 10/17/23 1400          Sit-Stand Transfer    Sit-Stand Campbell (Transfers) contact [facility]  -     Assistive Device (Sit-Stand Transfers) walker, front-wheeled  -RH       Row Name 10/17/23 1400          Stand-Sit Transfer    Stand-Sit Campbell (Transfers) contact guard  -     Assistive Device (Stand-Sit Transfers) walker, front-wheeled  -RH       Row Name 10/17/23 1400          Gait/Stairs (Locomotion)    Gait/Stairs Locomotion gait/ambulation assistive device;gait/ambulation independence  -     Campbell  Level (Gait) contact guard  -RH     Distance in Feet (Gait) 75  -RH     Pattern (Gait) 3-point;step-through  -RH     Deviations/Abnormal Patterns (Gait) gait speed decreased;stride length decreased;base of support, narrow  -RH     Negotiation (Stairs) stairs independence;stairs assistive device;handrail location;number of steps;ascending technique;descending technique  -     [place]ill Level (Stairs) contact guard  -RH     Handrail Location (Stairs) both sides  -     Number of Steps (Stairs) 5  -RH     Ascending Technique (Stairs) step-to-step  -RH     Descending Technique (Stairs) step-to-step  -RH       Row Name 10/17/23 1400          Safety Issues, Functional Mobility    Impairments Affecting Function (Mobility) balance;pain;range of motion (ROM);strength  -       Row Name 10/17/23 1400          Balance    Balance Assessment standing dynamic balance  -     Dynamic Standing Balance contact guard  -     Position/Device Used, Standing Balance walker, front-wheeled  -       Row Name 10/17/23 1400          Motor Skills    Therapeutic Exercise knee;hip;ankle  -       Row Name 10/17/23 1400          Hip (Therapeutic Exercise)    Hip (Therapeutic Exercise) isometric exercises  -     Hip Isometrics (Therapeutic Exercise) right;gluteal sets;10 repetitions;2 sets  -       Row Name 10/17/23 1400          Knee (Therapeutic Exercise)    Knee (Therapeutic Exercise) isometric exercises;strengthening exercise  -     Knee Isometrics (Therapeutic Exercise) right;quad sets;10 repetitions;2 sets  -     Knee Strengthening (Therapeutic Exercise) right;heel slides;SLR (straight leg raise);SAQ (short arc quad);LAQ (long arc quad);10 repetitions;2 sets  -       Row Name 10/17/23 1400          Ankle (Therapeutic Exercise)    Ankle (Therapeutic Exercise) AROM (active range of motion)  -     Ankle AROM (Therapeutic Exercise) right;dorsiflexion;plantarflexion;10 repetitions;2 sets  -       Row Name              Wound 10/16/23 1009 Right anterior knee Incision    Wound - Properties Group Placement Date: 10/16/23  -SC Placement Time: 1009  -SC Present on Original Admission: N  -SC Side: Right  -SC Orientation: anterior  -SC Location: knee  -SC Primary Wound Type: Incision  -SC    Retired Wound - Properties Group Placement Date: 10/16/23  -SC Placement Time: 1009  -SC Present on Original Admission: N  -SC Side: Right  -SC Orientation: anterior  -SC Location: knee  -SC Primary Wound Type: Incision  -SC    Retired Wound - Properties Group Date first assessed: 10/16/23  -SC Time first assessed: 1009  -SC Present on Original Admission: N  -SC Side: Right  -SC Location: knee  -SC Primary Wound Type: Incision  -SC      Row Name 10/17/23 1400          Progress Summary (PT)    Progress Toward Functional Goals (PT) progress toward functional goals is good  -RH     Daily Progress Summary (PT) Pt is progressing well with their exercise program.  Will continue current plan of care.  -               User Key  (r) = Recorded By, (t) = Taken By, (c) = Cosigned By      Initials Name Provider Type    Maryse Medrano, RN Registered Nurse    RH De Luong PTA Physical Therapist Assistant                    Physical Therapy Education       Title: PT OT SLP Therapies (Done)       Topic: Physical Therapy (Done)       Point: Mobility training (Done)       Learning Progress Summary             Patient Acceptance, E,TB, VU by ZT at 10/16/2023 1446                         Point: Body mechanics (Done)       Learning Progress Summary             Patient Acceptance, E,TB, VU by ZT at 10/16/2023 1446                         Point: Precautions (Done)       Learning Progress Summary             Patient Acceptance, E,TB, VU by ZT at 10/16/2023 1446                                         User Key       Initials Effective Dates Name Provider Type Discipline     09/05/23 -  Salomón Varela, PT Student PT Student PT                  PT  Recommendation and Plan     Progress Summary (PT)  Progress Toward Functional Goals (PT): progress toward functional goals is good  Daily Progress Summary (PT): Pt is progressing well with their exercise program.  Will continue current plan of care.   Outcome Measures       Row Name 10/17/23 1400 10/16/23 1400          How much help from another person do you currently need...    Turning from your back to your side while in flat bed without using bedrails? 3  -RH 3  -DOUG (r) ZT (t) DOUG (c)     Moving from lying on back to sitting on the side of a flat bed without bedrails? 3  -RH 3  -DOUG (r) ZT (t) DOUG (c)     Moving to and from a bed to a chair (including a wheelchair)? 2  -RH 2  -DOUG (r) ZT (t) DOUG (c)     Standing up from a chair using your arms (e.g., wheelchair, bedside chair)? 2  -RH 2  -DOUG (r) ZT (t) DOUG (c)     Climbing 3-5 steps with a railing? 3  -RH 1  -DOUG (r) ZT (t) DOUG (c)     To walk in hospital room? 4  -RH 1  -DOUG (r) ZT (t) DOUG (c)     AM-PAC 6 Clicks Score (PT) 17  -RH 12  -DOUG (r) ZT (t)     Highest level of mobility 5 --> Static standing  -RH 4 --> Transferred to chair/commode  -DOUG (r) ZT (t)               User Key  (r) = Recorded By, (t) = Taken By, (c) = Cosigned By      Initials Name Provider Type     De Luong, PTA Physical Therapist Assistant    Gerry Gore, PT Physical Therapist    ZT Salomón Varela, PT Student PT Student                     Time Calculation:    PT Charges       Row Name 10/17/23 1411             Time Calculation    PT Received On 10/17/23  -RH         Timed Charges    34672 - Gait Training Minutes  9  -RH      48923 - PT Therapeutic Activity Minutes 4  -RH         Untimed Charges    PT Group Therapy Minutes 30  -RH         Total Minutes    Timed Charges Total Minutes 13  -RH      Untimed Charges Total Minutes 30  -RH       Total Minutes 43  -RH                User Key  (r) = Recorded By, (t) = Taken By, (c) = Cosigned By      Initials Name Provider Type     Cherise  KINGSLEY Kc Physical Therapist Assistant                  Therapy Charges for Today       Code Description Service Date Service Provider Modifiers Qty    [id number]  GAIT TRAINING EA 15 MIN 10/17/2023 De Luong PTA GP 1    [id number]  PT THER PROC GROUP 10/17/2023 De Luong PTA GP 1            PT G-Codes  Outcome Measure Options: AM-PAC 6 Clicks Daily Activity (OT), Optimal Instrument  AM-PAC 6 Clicks Score (PT): 17  AM-PAC 6 Clicks Score (OT): 21    De Luong PTA  10/17/2023

## 2023-10-17 NOTE — THERAPY EVALUATION
Patient Name: Breanna York  : 1940    MRN: [id number]                              Today's Date: 10/17/2023       Admit Date: 10/16/2023    Visit Dx:     ICD-10-CM ICD-9-CM   1. Difficulty in walking  R26.2 719.7   2. Osteoarthritis of right knee, unspecified osteoarthritis type  M17.11 715.96   3. Decreased activities of daily living (ADL)  Z78.9 V49.89     Patient Active Problem List   Diagnosis    Essential hypertension    Mixed hyperlipidemia    Vitamin D deficiency    Primary localized osteoarthritis    Bruit of right carotid artery    Cervical radiculopathy    Bilateral carpal tunnel syndrome    Postmenopausal osteoporosis    Medicare annual wellness visit, subsequent    Onychomycosis    OA (osteoarthritis) of knee     Past Medical History:   Diagnosis Date    Anemia     Arthritis     Cancer     BASAL CELL    Hyperlipemia     Hypertension     Impingement syndrome of right shoulder 2018    Limb swelling     Primary osteoarthritis of both hips 2018    Primary osteoarthritis of right knee 2018    Thyroid disorder      Past Surgical History:   Procedure Laterality Date    CATARACT EXTRACTION, BILATERAL      COLONOSCOPY      FLEXOR TENDON REPAIR      HYSTERECTOMY      KNEE CARTILAGE SURGERY Bilateral     SKIN CANCER EXCISION      CALLY    THYROID SURGERY      TUMOR REMOVED    TOTAL KNEE ARTHROPLASTY Right 10/16/2023    Procedure: RIGHT TOTAL KNEE ARTHROPLASTY;  Surgeon: Candice Vo MD;  Location: [facility] (formerly [facility]);  Service: Orthopedics;  Laterality: Right;    TOTAL SHOULDER REPLACEMENT Right     ULNAR TUNNEL RELEASE      WRIST SURGERY Right     TENDON REPAIR      General Information       Row Name 10/17/23 1131 10/17/23 1110       OT Time and Intention    Document Type therapy note (daily note)  -ES evaluation  -ES    Mode of Treatment individual therapy;occupational therapy  -ES individual therapy;occupational therapy  -ES      Row Name 10/17/23 1131 10/17/23 1110       General  Information    Patient Profile Reviewed -- yes  -ES    Prior Level of Function -- independent:;ADL's;all household mobility;community mobility  Patient independent with B and IADLs at baseline. No device for functional mobility. Tub/shower or walk in shower with shower chair. Groom in stance. Denies recent falls. No home O2 use.  -ES    Existing Precautions/Restrictions fall;weight bearing  -ES fall;weight bearing  -ES    Barriers to Rehab -- none identified  -ES      Row Name 10/17/23 1110          Occupational Profile    Reason for Services/Referral (Occupational Profile) Patient is 83 yr old female admitted to [facility] on 10/16/2023 after failed conservative management of right knee osteoarthritis.  Patient is postop day 1 right total knee arthroplasty, weightbearing as tolerated right lower extremity. OT evaluation and treatment ordered d/t recent decline in ADLs/transfer ability and discharge planning recommendations. No previous OT services for current condition.  -ES       Row Name 10/17/23 1110          Living Environment    People in Home alone  has supportive family  -ES       Row Name 10/17/23 1110          Home Main Entrance    Number of Stairs, Main Entrance two  -ES       Row Name 10/17/23 1110          Stairs Within Home, Primary    Stairs, Within Home, Primary patient lives in split level home. Patient primary bedroom on second floor of split level, has moved all needs to first floor for post surgical discharge home  -ES     Number of Stairs, Within Home, Primary other (see comments)  -ES       Row Name 10/17/23 1110          Cognition    Orientation Status (Cognition) oriented x 3  -ES       Row Name 10/17/23 1110          Safety Issues, Functional Mobility    Impairments Affecting Function (Mobility) balance;strength;range of motion (ROM)  -ES               User Key  (r) = Recorded By, (t) = Taken By, (c) = Cosigned By      Initials Name Provider Type    ES Tiffany Major, OTR/L, CSRS  Occupational Therapist                     Mobility/ADL's       Row Name 10/17/23 1116          Bed Mobility    Bed Mobility bed mobility (all) activities  -ES     All Activities, [place] (Bed Mobility) not tested  -ES     Comment, (Bed Mobility) not tested. Patient met seated in recliner at therapy arrival to room.  -ES       Row Name 10/17/23 1131 10/17/23 1116       Transfers    Transfers -- sit-stand transfer;stand-sit transfer;toilet transfer  -ES    Comment, (Transfers) patient provided education and training on body mechanics, rolling walker management and hand placement in preperation for independent ADL routine completion at time of discharge  -ES --      Row Name 10/17/23 1116          Sit-Stand Transfer    Sit-Stand [place] (Transfers) contact guard;1 person assist  -ES     Assistive Device (Sit-Stand Transfers) walker, front-wheeled  -ES       Row Name 10/17/23 1116          Stand-Sit Transfer    Stand-Sit [place] (Transfers) contact guard;1 person assist  -ES     Assistive Device (Stand-Sit Transfers) walker, front-wheeled  -ES       Row Name 10/17/23 1116          Toilet Transfer    Type (Toilet Transfer) sit-stand;stand-sit  -ES     [place] Level (Toilet Transfer) contact guard;1 person assist  -ES     Assistive Device (Toilet Transfer) walker, front-wheeled  -ES       Row Name 10/17/23 1131 10/17/23 1116       Functional Mobility    Functional Mobility- Ind. Level -- contact guard assist;1 person  -ES    Functional Mobility- Device -- walker, front-wheeled  -ES    Functional Mobility- Comment patient performs functional mobility to/from bathroom and sinkside, CGA with use of rolling walker. Initial cueing required for rolling walker management to maintan all four legs on ground for patient safety with turns and mobility  -ES --      Row Name 10/17/23 1131 10/17/23 1116       Activities of Daily Living    BADL Assessment/Intervention upper body dressing;lower body  dressing;grooming;toileting  -ES bathing;upper body dressing;lower body dressing;grooming;feeding;toileting  -ES      Row Name 10/17/23 1131          Mobility    Extremity Weight-bearing Status right lower extremity  -ES     Right Lower Extremity (Weight-bearing Status) weight-bearing as tolerated (WBAT)  -ES       Row Name 10/17/23 1116          Bathing Assessment/Intervention    [place] Level (Bathing) bathing skills;minimum assist (75% patient effort)  -ES       Row Name 10/17/23 1131 10/17/23 1116       Upper Body Dressing Assessment/Training    [place] Level (Upper Body Dressing) upper body dressing skills;don;bra/undergarment;pull-over garment;set up  -ES upper body dressing skills;set up  -ES    Position (Upper Body Dressing) unsupported sitting  -ES --      Row Name 10/17/23 1131 10/17/23 1116       Lower Body Dressing Assessment/Training    [place] Level (Lower Body Dressing) lower body dressing skills;don;pants/bottoms;shoes/slippers;socks;minimum assist (75% patient effort)  -ES lower body dressing skills;minimum assist (75% patient effort)  -ES    Position (Lower Body Dressing) unsupported sitting;supported standing  -ES --    Comment, (Lower Body Dressing) patient provided education and training on adaptive lower body dressing strategies in preperation for independent ADL management at time of discharge. Patient provided return demonstration for ensured understanding.  -ES --      Row Name 10/17/23 1131 10/17/23 1116       Grooming Assessment/Training    [place] Level (Grooming) grooming skills;hair care, combing/brushing;oral care regimen;wash face, hands;set up;contact guard assist  -ES grooming skills;set up  -ES    Position (Grooming) sink side;supported standing  -ES --      Row Name 10/17/23 1116          Self-Feeding Assessment/Training    [place] Level (Feeding) feeding skills;set up  -ES       Row Name 10/17/23 1131 10/17/23 1116       Toileting Assessment/Training     [place] Level (Toileting) toileting skills;adjust/manage clothing;perform perineal hygiene;contact guard assist  -ES toileting skills;contact guard assist  -ES    Assistive Devices (Toileting) grab bar/safety frame;raised toilet seat  -ES --    Position (Toileting) unsupported sitting;supported standing  -ES --              User Key  (r) = Recorded By, (t) = Taken By, (c) = Cosigned By      Initials Name Provider Type    ES Tiffany Major, OTR/L, PAULS Occupational Therapist                   Obj/Interventions       Row Name 10/17/23 1117          Vision Assessment/Intervention    Visual Impairment/Limitations WFL  -ES       Row Name 10/17/23 1117          Range of Motion Comprehensive    General Range of Motion bilateral upper extremity ROM WNL  -ES       Row Name 10/17/23 1117          Strength Comprehensive (MMT)    General Manual Muscle Testing (MMT) Assessment no strength deficits identified  -ES     Comment, General Manual Muscle Testing (MMT) Assessment BUEs 4/5  -ES       Row Name 10/17/23 1117          Motor Skills    Motor Skills functional endurance  -ES     Functional Endurance fair/fair plus  -ES       Row Name 10/17/23 1139 10/17/23 1117       Balance    Balance Assessment -- sitting dynamic balance;standing dynamic balance  -ES    Dynamic Sitting Balance -- standby assist  -ES    Position, Sitting Balance -- supported;sitting in chair  -ES    Dynamic Standing Balance -- contact guard;1-person assist  -ES    Position/Device Used, Standing Balance -- supported;walker, front-wheeled  -ES    Comment, Balance patient provided education and training on dynamic ADL engagement in supported stance or seated position to decrease patient fall risk at time of discharge  -ES --              User Key  (r) = Recorded By, (t) = Taken By, (c) = Cosigned By      Initials Name Provider Type    ES Tiffany Major, ROBYR/L, PAULS Occupational Therapist                   Goals/Plan       Row Name 10/17/23 112           Transfer Goal 1 (OT)    Activity/Assistive Device (Transfer Goal 1, OT) transfers, all;walker, rolling  -ES     [place] Level/Cues Needed (Transfer Goal 1, OT) modified independence  -ES     Time Frame (Transfer Goal 1, OT) long term goal (LTG);10 days  -ES       Row Name 10/17/23 1123          Bathing Goal 1 (OT)    Activity/Device (Bathing Goal 1, OT) bathing skills, all  -ES     [place] Level/Cues Needed (Bathing Goal 1, OT) modified independence  -ES     Time Frame (Bathing Goal 1, OT) long term goal (LTG);10 days  -ES       Row Name 10/17/23 1123          Dressing Goal 1 (OT)    Activity/Device (Dressing Goal 1, OT) dressing skills, all  -ES     [place]/Cues Needed (Dressing Goal 1, OT) modified independence  -ES     Time Frame (Dressing Goal 1, OT) long term goal (LTG);10 days  -ES       Row Name 10/17/23 1123          Toileting Goal 1 (OT)    Activity/Device (Toileting Goal 1, OT) toileting skills, all  -ES     [place] Level/Cues Needed (Toileting Goal 1, OT) modified independence  -ES     Time Frame (Toileting Goal 1, OT) long term goal (LTG);10 days  -ES       Row Name 10/17/23 1123          Grooming Goal 1 (OT)    Activity/Device (Grooming Goal 1, OT) grooming skills, all  -ES     [place] (Grooming Goal 1, OT) modified independence  -ES     Time Frame (Grooming Goal 1, OT) long term goal (LTG);10 days  -ES       Row Name 10/17/23 1123          Problem Specific Goal 1 (OT)    Problem Specific Goal 1 (OT) Patient will demonstrate good graded dynamic balance in preperation for independent ADL routine completion at time of discharge  -ES     Time Frame (Problem Specific Goal 1, OT) long term goal (LTG);10 days  -ES       Row Name 10/17/23 1123          Therapy Assessment/Plan (OT)    Planned Therapy Interventions (OT) activity tolerance training;BADL retraining;functional balance retraining;occupation/activity based interventions;patient/caregiver education/training;strengthening  exercise;transfer/mobility retraining  -ES               User Key  (r) = Recorded By, (t) = Taken By, (c) = Cosigned By      Initials Name Provider Type    Tiffany Ko, OTR/L, CSRS Occupational Therapist                   Clinical Impression       Row Name 10/17/23 1141 10/17/23 1121       Plan of Care Review    Plan of Care Reviewed With -- patient  -ES    Progress -- no change  -ES    Outcome Evaluation Patient provided education and training on use of adaptive strategies to increase patient safety and independence with B and IADL task engagement, transfer training to maxamize patient safety with all functional transfers, and home modification for patient success and independence at time of discharge. Patient receptive to all education and training provided.  -ES Patient has experienced decline in function from baseline status, presenting w/ deficits related to balance, strength, safety awareness, transfers and mobility that impede patient independence with activities of daily living.  Patient would benefit from skilled Occupational Therapy intervention to maxamize patient safety, and promote return to baseline independence.  -ES      Row Name 10/17/23 1141 10/17/23 1121       Therapy Assessment/Plan (OT)    Patient/Family Therapy Goal Statement (OT) less pain  -ES --    Rehab Potential (OT) -- good, to achieve stated therapy goals  -ES    Criteria for Skilled Therapeutic Interventions Met (OT) -- yes;meets criteria;skilled treatment is necessary  -ES    Therapy Frequency (OT) -- 5 times/wk  -ES      Row Name 10/17/23 1121          Therapy Plan Review/Discharge Plan (OT)    Equipment Needs Upon Discharge (OT) --  patient has rolling walker and BSC for use at time of discharge  -ES     Anticipated Discharge Disposition (OT) home with home health;home with outpatient therapy services  -ES       Row Name 10/17/23 1121          Positioning and Restraints    Pre-Treatment Position sitting in chair/recliner  -ES      Post Treatment Position chair  -ES               User Key  (r) = Recorded By, (t) = Taken By, (c) = Cosigned By      Initials Name Provider Type    Tiffany Ko OTR/L, KEO Occupational Therapist                   Outcome Measures       Row Name 10/17/23 [employer]          How much help from another is currently needed...    Putting on and taking off regular lower body clothing? 3  -ES     Bathing (including washing, rinsing, and drying) 3  -ES     Toileting (which includes using toilet bed pan or urinal) 3  -ES     Putting on and taking off regular upper body clothing 4  -ES     Taking care of personal grooming (such as brushing teeth) 4  -ES     Eating meals 4  -ES     AM-PAC 6 Clicks Score (OT) 21  -ES       Row Name 10/17/23 [employer]          Functional Assessment    Outcome Measure Options AM-PAC 6 Clicks Daily Activity (OT);Optimal Instrument  -ES       Row Name 10/17/23 [facility]6          Optimal Instrument    Optimal Instrument Optimal - 3  -ES     Bending/Stooping 2  -ES     Standing 2  -ES     Reaching 1  -ES     From the list, choose the 3 activities you would most like to be able to do without any difficulty Bending/stooping;Standing;Reaching  -ES     Total Score Optimal - 3 5  -ES               User Key  (r) = Recorded By, (t) = Taken By, (c) = Cosigned By      Initials Name Provider Type    Tiffany Ko OTR/L, KEO Occupational Therapist                      OT Recommendation and Plan  Planned Therapy Interventions (OT): activity tolerance training, BADL retraining, functional balance retraining, occupation/activity based interventions, patient/caregiver education/training, strengthening exercise, transfer/mobility retraining  Therapy Frequency (OT): 5 times/wk  Plan of Care Review  Plan of Care Reviewed With: patient  Progress: no change  Outcome Evaluation: Patient provided education and training on use of adaptive strategies to increase patient safety and independence with B and IADL task engagement,  transfer training to [facility] patient safety with all functional transfers, and home modification for patient success and independence at time of discharge. Patient receptive to all education and training provided.     Time Calculation:   Evaluation Complexity (OT)  Review Occupational Profile/Medical/Therapy History Complexity: brief/low complexity  Assessment, Occupational Performance/Identification of Deficit Complexity: 3-5 performance deficits  Clinical Decision Making Complexity (OT): problem focused assessment/low complexity  Overall Complexity of Evaluation (OT): low complexity     Time Calculation- OT       Row Name 10/17/23 1128             Time Calculation- OT    OT Received On 10/17/23  -ES      OT Goal Re-Cert Due Date 10/26/23  -ES         Timed Charges    11685 - OT Self Care/Mgmt Minutes 25  -ES         Untimed Charges    OT Eval/Re-eval Minutes 18  -ES         Total Minutes    Timed Charges Total Minutes 25  -ES      Untimed Charges Total Minutes 18  -ES       Total Minutes 43  -ES                User Key  (r) = Recorded By, (t) = Taken By, (c) = Cosigned By      Initials Name Provider Type    ES Tiffany Major, OTR/L, CSRS Occupational Therapist                  Therapy Charges for Today       Code Description Service Date Service Provider Modifiers Qty    65554097333 HC OT SELF CARE/MGMT/TRAIN EA 15 MIN 10/17/2023 Tiffany Major, OTR/L, CSRS GO 2    [id number] HC OT EVAL LOW COMPLEXITY 2 10/17/2023 Tiffany Major, OTR/L, CSRS GO 1                 Tiffany Major OTR/L, CSRS  10/17/2023

## 2023-10-17 NOTE — DISCHARGE SUMMARY
[facility]         HOSPITALIST  DISCHARGE SUMMARY    Patient Name: Breanna York  : 1940  MRN: [id number]    Date of Admission: 10/16/2023  Date of Discharge:  10/17/2023  Primary Care Physician: Topher Perera MD    Consults       Date and Time Order Name Status Description    10/16/2023 12:15 PM Inpatient Hospitalist Consult Completed             Active and Resolved Hospital Problems:  Postop nausea vomiting -improved  Primary osteoarthritis right knee status post right total knee arthroplasty -continue pain medicine, DVT prophylaxis  History of hypertension -resume home antihypertensives upon discharge  Hyperlipidemia -resume home statin    Hospital Course     Hospital Course:  Breanna York is a 83 y.o. female past medical history significant for hypertension, primary osteoarthritis of the right knee presents following right total knee arthroplasty with postop nausea and vomiting refractory to Zofran.  Patient remains very lethargic following procedure only answering yes and no questions.  Son is at the bedside gives most of the history.  No history of postoperative nausea.  Only abdominal surgery was a hysterectomy remotely.  Of note patient not able to ambulate to the bedside chair from the [facility] postoperatively.   Preop labs reviewed and significant only for hyperlipidemia.  Patient slightly hypothermic postop and placed on Sage hugger.  Heart rate within normal limits.  Blood pressure within normal limits.  Satting well on 3 L nasal cannula.  No other issues per nursing. Patient and family hoping patient will qualify for inpatient rehab as she lives alone and is concerned about her safety on returning home with the postoperative knee.  Patient ended up working well with physical therapy today, patient believes she can return home safely.  Patient will require home health services at discharge, patient will follow-up with orthopedic surgery in 2 weeks.  She is discharged home today  in stable condition    Day of Discharge     Vital Signs:  Temp:  [94.8 °F (34.9 °C)-98.5 °F (36.9 °C)] 97.7 °F (36.5 °C)  Heart Rate:  [64-88] 64  Resp:  [12-18] 16  BP: (105-136)/(48-71) 110/51  Flow (L/min):  [3-4] 3  FiO2 (%):  [48 %-100 %] 100 %    Physical Exam:   GEN: No acute distress  HEENT: Moist mucous membranes  LUNGS: Equal chest rise bilaterally  CARDIAC: Regular rate and rhythm  NEURO: Moving all 4 extremities spontaneously  SKIN: No obvious breakdown, postsurgical changes of the knee noted, dressing clean dry and intact    Discharge Details        Discharge Medications        ASK your doctor about these medications        Instructions Start Date   amLODIPine 5 MG tablet  Commonly known as: NORVASC   5 mg, Oral, 2 Times Daily      Biotin 1000 MCG chewable tablet   Oral      Cholecalciferol 50 MCG (2000 UT) tablet   Vitamin D3 50 mcg (2,000 unit) oral tablet take 1 tablet by oral route daily   Active      diclofenac 50 MG EC tablet  Commonly known as: VOLTAREN   50 mg, Oral, 2 Times Daily PRN      Diclofenac Sodium 1 % gel gel  Commonly known as: VOLTAREN   4 g, Topical, 4 Times Daily PRN      gabapentin 100 MG capsule  Commonly known as: NEURONTIN   TAKE 2 CAPSULES AT BEDTIME      HYDROcodone-acetaminophen 5-325 MG per tablet  Commonly known as: Norco   1/2 to 1 tablet up to 4 times a day as needed for postop pain.               Allergies   Allergen Reactions    Ace Inhibitors Angioedema     This occurred after some insect bites, but still need to defer this class of medications going forward    Meperidine Hallucinations    Latex Rash       Discharge Disposition:      Diet:  Hospital:  Diet Order   Procedures    Diet: Regular/House Diet; Texture: Regular Texture (IDDSI 7); Fluid Consistency: Thin (IDDSI 0)       Discharge Activity:       CODE STATUS:  Code Status and Medical Interventions:   Ordered at: 10/16/23 1426     Code Status (Patient has no pulse and is not breathing):    CPR (Attempt to  Resuscitate)     Medical Interventions (Patient has pulse or is breathing):    Full Support       Future Appointments   Date Time Provider Department Center   10/31/2023  1:45 PM Karlee Cardenas PA-C [facility]   2/15/2024 12:30 PM [facility]   4/11/2024  8:30 AM Topher Perera MD [facility]           Pertinent  and/or Most Recent Results     IMAGING:  XR Knee 1 or 2 View Right    Result Date: 10/16/2023  PROCEDURE: XR KNEE 1 OR 2 VW RIGHT  COMPARISON: [facility] , CR, XR KNEE 3 VW RIGHT, 8/10/2023, 10:11.  INDICATIONS: Post-Op Knee Arthoplasty  FINDINGS:  There has been interval placement of a total knee arthroplasty in near-anatomic alignment. No periprosthetic fracture is identified. Postoperative soft tissue gas is noted. Multiple overlying skin staples are noted.       Status post total knee arthroplasty in near anatomic alignment, with no evidence of immediate complication.      LATRELL HERRERA MD       Electronically Signed and Approved By: LATRELL HERRERA MD on 10/16/2023 at 11:52             Peripheral Block    Result Date: 10/16/2023  Romaine Null MD     10/16/2023  9:16 AM Peripheral Block Patient reassessed immediately prior to procedure Patient location during procedure: pre-op Start time: 10/16/2023 9:12 AM Stop time: 10/16/2023 9:14 AM Reason for block: at surgeon's request and post-op pain management Preanesthetic Checklist Completed: patient identified, IV checked, site marked, risks and benefits discussed, surgical consent, monitors and equipment checked, pre-op evaluation and timeout performed Prep: Pt Position: supine Sterile barriers:alcohol skin prep, partial drape, cap, washed/disinfected hands, mask and gloves Prep: ChloraPrep Patient monitoring: blood pressure monitoring, continuous pulse oximetry and EKG Procedure Sedation: yes Performed under: local infiltration Guidance:ultrasound guided ULTRASOUND INTERPRETATION.  Using ultrasound guidance  a 20 G gauge needle was placed in close proximity to the nerve, at which point, under ultrasound guidance anesthetic was injected in the area of the nerve and spread of the anesthesia was seen on ultrasound in close proximity thereto.  There were no abnormalities seen on ultrasound; a digital image was taken; and the patient tolerated the procedure with no complications. Images:still images obtained, printed/placed on chart Laterality:right Block Type:adductor canal block Injection Technique:single-shot Needle Type:echogenic Needle Gauge:20 G (4in) Resistance on Injection: none Medications Used: bupivacaine-EPINEPHrine PF (MARCAINE w/EPI) 0.5% -[id number] injection - Injection  30 mL - 10/16/2023 9:12:00 AM Post Assessment Injection Assessment: negative aspiration for heme, no paresthesia on injection and incremental injection Patient Tolerance:comfortable throughout block Complications:no Additional Notes The block or continuous infusion is requested by the referring physician for management of postoperative pain, or pain related to a procedure. Ultrasound guidance (deemed medically necessary). Painless injection, pt was awake and conversant during the procedure without complications. Needle and surrounding structures visualized throughout procedure. No adverse reactions or complications seen during this period. Post-procedure image showed no signs of complication, and anatomy was consistent with an uncomplicated nerve blockade.       LAB RESULTS:      Lab 10/17/23  0343   HEMOGLOBIN 9.6*   HEMATOCRIT 29.8*         Lab 10/17/23  0343   SODIUM 137   POTASSIUM 3.8   CHLORIDE 103   CO2 25.7   ANION GAP 8.3   BUN 14   CREATININE 0.85   EGFR 68.1   GLUCOSE 108*   CALCIUM 8.6                         Brief Urine Lab Results       None          Microbiology Results (last 10 days)       ** No results found for the last 240 hours. **          Results for orders placed during the hospital encounter of 11/01/21    Duplex Carotid  Ultrasound CAR    Interpretation Summary  · All other left side carotid system vessels are normal.  · All other right side carotid system vessels are normal.  · Normal carotid-vertebral bilateral duplex scan.    Results for orders placed during the hospital encounter of 11/01/21    Duplex Carotid Ultrasound CAR    Interpretation Summary  · All other left side carotid system vessels are normal.  · All other right side carotid system vessels are normal.  · Normal carotid-vertebral bilateral duplex scan.        Time spent on Discharge including face to face service: Greater than 30 minutes      Electronically signed by Boone Dove MD, 10/17/23, 10:01 AM EDT.

## 2023-10-17 NOTE — PROGRESS NOTES
" Orthopedic Total Knee Progress Note    Assessment/Plan requesting inpatient rehab, DVT prophylaxis, weight-bear as tolerated, PT-OT    Status post-right total knee arthroplasty: Doing well postoperatively.    Pain Relief: some relief    Continues current post-op course    Activity: up with assistance    Weight Bearing: WBAT     LOS: 0 days     Subjective     Post-Operative Day: 1 post-right total knee arthroplasty  Systemic or Specific Complaints: No Complaints    Objective     Vital signs in last 24 hours:  Vitals:    10/16/23 1914 10/16/23 1954 10/16/23 2349 10/17/23 0346   BP:  123/64 133/59 113/51   BP Location:  Right arm Right arm Left arm   Patient Position:  Lying Lying Sitting   Pulse: 80 68 71 64   Resp: 16 12 14 14   Temp:  97.8 °F (36.6 °C) 98 °F (36.7 °C) 98.5 °F (36.9 °C)   TempSrc:  Oral Oral Oral   SpO2: 94% 97% 99% 96%   Weight:       Height:            General: alert, appears stated age, and cooperative   Neurovascular: Tibial nerve: Intact, Superficial peroneal nerve: Intact, and Deep peroneal nerve: Intact  Capillary refill: Normal   Wound: Wound clean and dry no evidence of infection.   Range of Motion: Limited flexsion and Limited extension   DVT Exam: No evidence of DVT seen on physical exam.      Hemoglobin   Date Value Ref Range Status   10/17/2023 9.6 (L) 12.0 - 15.9 g/dL Final     Hematocrit   Date Value Ref Range Status   10/17/2023 29.8 (L) 34.0 - 46.6 % Final        Basic Metabolic Panel    Sodium Sodium   Date Value Ref Range Status   10/17/2023 137 136 - 145 mmol/L Final      Potassium Potassium   Date Value Ref Range Status   10/17/2023 3.8 3.5 - 5.2 mmol/L Final      Chloride Chloride   Date Value Ref Range Status   10/17/2023 103 98 - 107 mmol/L Final      Bicarbonate No results found for: \"PLASMABICARB\"   BUN BUN   Date Value Ref Range Status   10/17/2023 14 8 - 23 mg/dL Final      Creatinine Creatinine   Date Value Ref Range Status   10/17/2023 0.85 0.57 - 1.00 mg/dL Final    " "  Calcium Calcium   Date Value Ref Range Status   10/17/2023 8.6 8.6 - 10.5 mg/dL Final      Glucose      No components found for: \"GLUCOSE.*\"      XR Knee 1 or 2 View Right   Final Result    Status post total knee arthroplasty in near anatomic alignment, with no evidence of    immediate complication.                    LATRELL HERRERA MD          Electronically Signed and Approved By: LATRELL HERRERA MD on 10/16/2023 at 11:52                                "

## 2023-10-17 NOTE — PLAN OF CARE
Goal Outcome Evaluation:  Plan of Care Reviewed With: patient        Progress: no change  Outcome Evaluation: pt. is a one person assist with walker and has ambulted 25 feet this shift.  voiding without difficulty.  pt. has been medicated with scheduled and prn pain medication with relief noted.  no  significant changes noted this shift.  upon discharge pt has home health arranged but would prefer to go to inpatient rehab.

## 2023-10-17 NOTE — SIGNIFICANT NOTE
10/17/23 1046   Plan   Final Discharge Disposition Code 06 - home with home health care   Final Note Home with [facility].

## 2023-10-18 DIAGNOSIS — Z96.651 AFTERCARE FOLLOWING RIGHT KNEE JOINT REPLACEMENT SURGERY: Primary | ICD-10-CM

## 2023-10-18 DIAGNOSIS — Z47.1 AFTERCARE FOLLOWING RIGHT KNEE JOINT REPLACEMENT SURGERY: Primary | ICD-10-CM

## 2023-10-18 RX ORDER — CYCLOBENZAPRINE HCL 10 MG
5 TABLET ORAL 2 TIMES DAILY PRN
Qty: 30 TABLET | Refills: 0 | Status: SHIPPED | OUTPATIENT
Start: 2023-10-18

## 2023-10-18 RX ORDER — CYCLOBENZAPRINE HCL 10 MG
5 TABLET ORAL 2 TIMES DAILY PRN
Qty: 30 TABLET | Refills: 0 | Status: SHIPPED | OUTPATIENT
Start: 2023-10-18 | End: 2023-10-18 | Stop reason: SDUPTHER

## 2023-10-23 ENCOUNTER — TELEPHONE (OUTPATIENT)
Dept: ORTHOPEDIC SURGERY | Facility: CLINIC | Age: 83
End: 2023-10-23
Payer: MEDICARE

## 2023-10-23 ENCOUNTER — TELEPHONE (OUTPATIENT)
Dept: INTERNAL MEDICINE | Facility: CLINIC | Age: 83
End: 2023-10-23

## 2023-10-23 ENCOUNTER — HOSPITAL ENCOUNTER (OUTPATIENT)
Dept: CARDIOLOGY | Facility: HOSPITAL | Age: 83
Discharge: HOME | End: 2023-10-23
Admitting: ORTHOPAEDIC SURGERY
Payer: MEDICARE

## 2023-10-23 DIAGNOSIS — M79.604 ACUTE LEG PAIN, RIGHT: Primary | ICD-10-CM

## 2023-10-23 DIAGNOSIS — M79.604 ACUTE LEG PAIN, RIGHT: ICD-10-CM

## 2023-10-23 PROBLEM — Z96.651 S/P TOTAL KNEE REPLACEMENT, RIGHT: Status: ACTIVE | Noted: 2023-10-23

## 2023-10-23 PROBLEM — Z09 HOSPITAL DISCHARGE FOLLOW-UP: Status: ACTIVE | Noted: 2023-10-23

## 2023-10-23 PROBLEM — M17.9 OA (OSTEOARTHRITIS) OF KNEE: Status: RESOLVED | Noted: 2023-09-13 | Resolved: 2023-10-23

## 2023-10-23 LAB
BH CV LOWER VASCULAR LEFT COMMON FEMORAL AUGMENT: NORMAL
BH CV LOWER VASCULAR LEFT COMMON FEMORAL COMPETENT: NORMAL
BH CV LOWER VASCULAR LEFT COMMON FEMORAL COMPRESS: NORMAL
BH CV LOWER VASCULAR LEFT COMMON FEMORAL PHASIC: NORMAL
BH CV LOWER VASCULAR LEFT COMMON FEMORAL SPONT: NORMAL
BH CV LOWER VASCULAR RIGHT COMMON FEMORAL AUGMENT: NORMAL
BH CV LOWER VASCULAR RIGHT COMMON FEMORAL COMPETENT: NORMAL
BH CV LOWER VASCULAR RIGHT COMMON FEMORAL COMPRESS: NORMAL
BH CV LOWER VASCULAR RIGHT COMMON FEMORAL PHASIC: NORMAL
BH CV LOWER VASCULAR RIGHT COMMON FEMORAL SPONT: NORMAL
BH CV LOWER VASCULAR RIGHT DISTAL FEMORAL COMPRESS: NORMAL
BH CV LOWER VASCULAR RIGHT GASTRONEMIUS COMPRESS: NORMAL
BH CV LOWER VASCULAR RIGHT GREATER SAPH AK COMPRESS: NORMAL
BH CV LOWER VASCULAR RIGHT GREATER SAPH BK COMPRESS: NORMAL
BH CV LOWER VASCULAR RIGHT LESSER SAPH COMPRESS: NORMAL
BH CV LOWER VASCULAR RIGHT MID FEMORAL AUGMENT: NORMAL
BH CV LOWER VASCULAR RIGHT MID FEMORAL COMPETENT: NORMAL
BH CV LOWER VASCULAR RIGHT MID FEMORAL COMPRESS: NORMAL
BH CV LOWER VASCULAR RIGHT MID FEMORAL PHASIC: NORMAL
BH CV LOWER VASCULAR RIGHT MID FEMORAL SPONT: NORMAL
BH CV LOWER VASCULAR RIGHT PERONEAL COMPRESS: NORMAL
BH CV LOWER VASCULAR RIGHT POPLITEAL AUGMENT: NORMAL
BH CV LOWER VASCULAR RIGHT POPLITEAL COMPETENT: NORMAL
BH CV LOWER VASCULAR RIGHT POPLITEAL COMPRESS: NORMAL
BH CV LOWER VASCULAR RIGHT POPLITEAL PHASIC: NORMAL
BH CV LOWER VASCULAR RIGHT POPLITEAL SPONT: NORMAL
BH CV LOWER VASCULAR RIGHT POSTERIOR TIBIAL COMPRESS: NORMAL
BH CV LOWER VASCULAR RIGHT PROXIMAL FEMORAL COMPRESS: NORMAL
BH CV LOWER VASCULAR RIGHT SAPHENOFEMORAL JUNCTION COMPRESS: NORMAL

## 2023-10-23 PROCEDURE — 93971 EXTREMITY STUDY: CPT

## 2023-10-23 PROCEDURE — 93971 EXTREMITY STUDY: CPT | Performed by: SURGERY

## 2023-10-23 NOTE — TELEPHONE ENCOUNTER
RICHARD FROM [facility] CALLED AND INSISTING THAT PATIENT HAVE A DOPSCAN DUE TO PAIN AND SWELLING.  DOPSCAN ORDERED, 10-23-23 AT 330PM AT [address].  ADDITIONALLY, PATIENT TO BE SEEN I THE OFFICE ON 10-24-23 FOR MULTIPLE POST OP CONCERNS AT 8AM.  PATIENT VOICES UNDERSTANDING OF THE APPOINTMENTS.

## 2023-10-23 NOTE — TELEPHONE ENCOUNTER
PT was scheduled for hospital f/u today at 2:30, but she had to be at the hospital for a doppler at 3:30, I explained to her that we would cancel this appt and see what day you wanted to see her and I would call her, I explained that checking for a blood clot was more time pressing. Please advise.

## 2023-10-24 ENCOUNTER — OFFICE VISIT (OUTPATIENT)
Dept: ORTHOPEDIC SURGERY | Facility: CLINIC | Age: 83
End: 2023-10-24
Payer: MEDICARE

## 2023-10-24 VITALS
HEART RATE: 114 BPM | DIASTOLIC BLOOD PRESSURE: 84 MMHG | HEIGHT: 62 IN | SYSTOLIC BLOOD PRESSURE: 145 MMHG | WEIGHT: 127.65 LBS | OXYGEN SATURATION: 95 % | BODY MASS INDEX: 23.49 KG/M2

## 2023-10-24 DIAGNOSIS — M25.561 RIGHT KNEE PAIN, UNSPECIFIED CHRONICITY: Primary | ICD-10-CM

## 2023-10-24 DIAGNOSIS — Z96.651 AFTERCARE FOLLOWING RIGHT KNEE JOINT REPLACEMENT SURGERY: Primary | ICD-10-CM

## 2023-10-24 DIAGNOSIS — M25.561 RIGHT KNEE PAIN, UNSPECIFIED CHRONICITY: ICD-10-CM

## 2023-10-24 DIAGNOSIS — Z47.1 AFTERCARE FOLLOWING RIGHT KNEE JOINT REPLACEMENT SURGERY: Primary | ICD-10-CM

## 2023-10-24 PROCEDURE — 1160F RVW MEDS BY RX/DR IN RCRD: CPT | Performed by: PHYSICIAN ASSISTANT

## 2023-10-24 PROCEDURE — 99024 POSTOP FOLLOW-UP VISIT: CPT | Performed by: PHYSICIAN ASSISTANT

## 2023-10-24 PROCEDURE — 3077F SYST BP >= 140 MM HG: CPT | Performed by: PHYSICIAN ASSISTANT

## 2023-10-24 PROCEDURE — 1159F MED LIST DOCD IN RCRD: CPT | Performed by: PHYSICIAN ASSISTANT

## 2023-10-24 PROCEDURE — 3079F DIAST BP 80-89 MM HG: CPT | Performed by: PHYSICIAN ASSISTANT

## 2023-10-24 RX ORDER — CEPHALEXIN 500 MG/1
500 CAPSULE ORAL 3 TIMES DAILY
Qty: 30 CAPSULE | Refills: 0 | Status: SHIPPED | OUTPATIENT
Start: 2023-10-24

## 2023-10-24 RX ORDER — OXYCODONE AND ACETAMINOPHEN 7.5; 325 MG/1; MG/1
1 TABLET ORAL EVERY 4 HOURS PRN
Qty: 40 TABLET | Refills: 0 | Status: SHIPPED | OUTPATIENT
Start: 2023-10-24

## 2023-10-24 NOTE — PROGRESS NOTES
"Chief Complaint  Pain and Follow-up of the Right Knee    Subjective      Breanna York presents to [facility] for follow up of her right knee. Patient is 1 weeks postop right total knee arthroplasty performed by Dr Vo on 10/16/2023.  Yesterday, home health nurse called the office with concern of a possible blood clot in the right lower extremity due to significant swelling and pain.  Stat venous Doppler scan was ordered and negative. Today, 10/24/2023 she states the redness and swelling was all the way up to her groin and down to her ankle. She said the swelling and tightness and pain is very severe. She has more pain on the outside of the knee. She has therapy coming to the house. She is taking hydrocodone for pain - she needs a refill of the pain medication. She doesn't get much relief with the pain medication. She denies complications from incision site; denies fever or chills.  She is using ice multiple times a day which she states it does help. She hasn't been able to tolerate the compression stockings - she has been using an ace wrap to help with compression.     Allergies   Allergen Reactions    Ace Inhibitors Angioedema     This occurred after some insect bites, but still need to defer this class of medications going forward    Meperidine Hallucinations    Latex Rash       Objective     Vital Signs:   Vitals:    10/24/23 0813   BP: 145/84   Pulse: 114   SpO2: 95%   Weight: 57.9 kg (127 lb 10.3 oz)   Height: 157.5 cm (62\")     Body mass index is 23.35 kg/m².    I reviewed the patient's chief complaint, history of present illness, review of systems, past medical history, surgical history, family history, social history, medications, and allergy list.     REVIEW OF SYSTEMS    Constitutional: Denies fevers, chills, weight loss  Cardiovascular: Denies chest pain, shortness of breath  Skin: Denies rashes, acute skin changes  Neurologic: Denies headache, loss of consciousness  MSK: " right knee pain.     Ortho Exam  Knee General: Alert, no acute distress.   Right Knee:  Incision is clean, dry and intact, without swelling, or drainage. Staples intact. Moderate swelling. Erythema to the posterior and medial knee with warmth.  Bruising to Sensation and distal neurovascularly intact. -15 knee extension. Flexion to 70 degrees. Calf is tender and 2+ pitting edema to ankle.  Full ankle range of motion. Palpable pedal pulses.         Imaging Results (Most Recent)       Procedure Component Value Units Date/Time    XR Knee 3 View Right [[id number]] Resulted: 10/24/23 1116     Updated: 10/24/23 1118    Narrative:      X-Ray Report:  Study: X-rays ordered, taken in the office, and reviewed today  Site: Right knee xray  Indication: Status post total right knee arthroplasty  View: AP/Lateral and [place] view(s)  Findings: Intact right knee arthroplasty.  No evidence of hardware   malfunction. Post-op staples intact.   Prior studies available for comparison: yes            Interpretation Summary      Normal right lower extremity venous duplex scan.         Assessment and Plan   Diagnoses and all orders for this visit:    1. Aftercare following right knee joint replacement surgery (Primary)  -     cephalexin (KEFLEX) 500 MG capsule; Take 1 capsule by mouth 3 (Three) Times a Day.  Dispense: 30 capsule; Refill: 0    2. Right knee pain, unspecified chronicity  -     XR Knee 3 View Right       Breanna York presents today 1 weeks post op right total knee arthroplasty performed by Dr. Vo on 10/16/2023.  Yesterday, home health nurse called with concerns of pain and swelling and requested a venous Doppler ultrasound be ordered due to the concerns of possible blood clot.  That was performed which was normal.  X-rays were ordered and obtained and reviewed with the patient today in office. We will change her pain medication at this time to help with greater pain control. Continue frequent icing and elevating to help  with swelling. Rx for keflex sent to pharmacy today. Advised to take as prescribed and to completion.     Patient will follow-up in 1 weeks for reevaluation.  We will obtain new x-rays of the right knee at next visit.    Call or return if symptoms worsen or patient has any concerns.        Follow Up   Return in about 1 week (around 10/31/2023).  There are no Patient Instructions on file for this visit.  Patient was given instructions and counseling regarding her condition or for health maintenance advice. Please see specific information pulled into the AVS if appropriate.

## 2023-10-30 ENCOUNTER — OFFICE VISIT (OUTPATIENT)
Dept: INTERNAL MEDICINE | Facility: CLINIC | Age: 83
End: 2023-10-30
Payer: MEDICARE

## 2023-10-30 VITALS
TEMPERATURE: 97.9 F | DIASTOLIC BLOOD PRESSURE: 80 MMHG | HEIGHT: 62 IN | SYSTOLIC BLOOD PRESSURE: 142 MMHG | OXYGEN SATURATION: 96 % | HEART RATE: 100 BPM | WEIGHT: 133 LBS | BODY MASS INDEX: 24.48 KG/M2

## 2023-10-30 DIAGNOSIS — I10 ESSENTIAL HYPERTENSION: ICD-10-CM

## 2023-10-30 DIAGNOSIS — Z09 HOSPITAL DISCHARGE FOLLOW-UP: Primary | ICD-10-CM

## 2023-10-30 DIAGNOSIS — Z96.651 S/P TOTAL KNEE REPLACEMENT, RIGHT: ICD-10-CM

## 2023-10-30 DIAGNOSIS — D62 ACUTE POSTOPERATIVE ANEMIA DUE TO EXPECTED BLOOD LOSS: ICD-10-CM

## 2023-10-30 LAB
ALBUMIN SERPL-MCNC: 4.3 G/DL (ref 3.5–5.2)
ANION GAP SERPL CALCULATED.3IONS-SCNC: 12.1 MMOL/L (ref 5–15)
BASOPHILS # BLD AUTO: 0.08 10*3/MM3 (ref 0–0.2)
BASOPHILS NFR BLD AUTO: 1 % (ref 0–1.5)
BUN SERPL-MCNC: 15 MG/DL (ref 8–23)
BUN/CREAT SERPL: 17.9 (ref 7–25)
CALCIUM SPEC-SCNC: 8.8 MG/DL (ref 8.6–10.5)
CHLORIDE SERPL-SCNC: 99 MMOL/L (ref 98–107)
CO2 SERPL-SCNC: 26.9 MMOL/L (ref 22–29)
CREAT SERPL-MCNC: 0.84 MG/DL (ref 0.57–1)
DEPRECATED RDW RBC AUTO: 44.8 FL (ref 37–54)
EGFRCR SERPLBLD CKD-EPI 2021: 69 ML/MIN/1.73
EOSINOPHIL # BLD AUTO: 0.25 10*3/MM3 (ref 0–0.4)
EOSINOPHIL NFR BLD AUTO: 3.2 % (ref 0.3–6.2)
ERYTHROCYTE [DISTWIDTH] IN BLOOD BY AUTOMATED COUNT: 13.1 % (ref 12.3–15.4)
GLUCOSE SERPL-MCNC: 84 MG/DL (ref 65–99)
HCT VFR BLD AUTO: 30.5 % (ref 34–46.6)
HGB BLD-MCNC: 9.9 G/DL (ref 12–15.9)
IMM GRANULOCYTES # BLD AUTO: 0.04 10*3/MM3 (ref 0–0.05)
IMM GRANULOCYTES NFR BLD AUTO: 0.5 % (ref 0–0.5)
IRON 24H UR-MRATE: 33 MCG/DL (ref 37–145)
IRON SATN MFR SERPL: 9 % (ref 20–50)
LYMPHOCYTES # BLD AUTO: 2.06 10*3/MM3 (ref 0.7–3.1)
LYMPHOCYTES NFR BLD AUTO: 26.4 % (ref 19.6–45.3)
MCH RBC QN AUTO: 30.3 PG (ref 26.6–33)
MCHC RBC AUTO-ENTMCNC: 32.5 G/DL (ref 31.5–35.7)
MCV RBC AUTO: 93.3 FL (ref 79–97)
MONOCYTES # BLD AUTO: 0.81 10*3/MM3 (ref 0.1–0.9)
MONOCYTES NFR BLD AUTO: 10.4 % (ref 5–12)
NEUTROPHILS NFR BLD AUTO: 4.55 10*3/MM3 (ref 1.7–7)
NEUTROPHILS NFR BLD AUTO: 58.5 % (ref 42.7–76)
NRBC BLD AUTO-RTO: 0 /100 WBC (ref 0–0.2)
PHOSPHATE SERPL-MCNC: 2.8 MG/DL (ref 2.5–4.5)
PLATELET # BLD AUTO: 515 10*3/MM3 (ref 140–450)
PMV BLD AUTO: 9.3 FL (ref 6–12)
POTASSIUM SERPL-SCNC: 3.5 MMOL/L (ref 3.5–5.2)
RBC # BLD AUTO: 3.27 10*6/MM3 (ref 3.77–5.28)
SODIUM SERPL-SCNC: 138 MMOL/L (ref 136–145)
TIBC SERPL-MCNC: 359 MCG/DL (ref 298–536)
TRANSFERRIN SERPL-MCNC: 241 MG/DL (ref 200–360)
WBC NRBC COR # BLD: 7.79 10*3/MM3 (ref 3.4–10.8)

## 2023-10-30 PROCEDURE — 85025 COMPLETE CBC W/AUTO DIFF WBC: CPT | Performed by: ORTHOPAEDIC SURGERY

## 2023-10-30 PROCEDURE — 83540 ASSAY OF IRON: CPT | Performed by: INTERNAL MEDICINE

## 2023-10-30 PROCEDURE — 84466 ASSAY OF TRANSFERRIN: CPT | Performed by: INTERNAL MEDICINE

## 2023-10-30 PROCEDURE — 82728 ASSAY OF FERRITIN: CPT | Performed by: INTERNAL MEDICINE

## 2023-10-30 PROCEDURE — 80069 RENAL FUNCTION PANEL: CPT | Performed by: INTERNAL MEDICINE

## 2023-10-30 RX ORDER — FUROSEMIDE 20 MG/1
20 TABLET ORAL DAILY
Qty: 3 TABLET | Refills: 0 | Status: SHIPPED | OUTPATIENT
Start: 2023-10-30 | End: 2023-11-02

## 2023-10-30 RX ORDER — ASPIRIN 325 MG
325 TABLET ORAL DAILY
COMMUNITY

## 2023-10-30 NOTE — PROGRESS NOTES
"Chief Complaint  Hospital Follow Up Visit ([facility]- Rt knee replacement. Pt is currently having Home Health 3x's per week for PT. Pain scale is at 5 , swelling. Pt states staples come out tomorrow. Pt states waiting on ok for flu shot from provider. )    Subjective          Breanna York presents to [facility]     History of Present Illness  Pleasant 82 yo female with HTN, HYPERLIPIDEMIA, DJD, among others, coming in 10/23 for routine 6-month f/u.  We will go over her med list, review her labs in detail, and address any new concerns she may have.    --->here later 10/23 for Hosp F/U:  Date of Admission: 10/16/2023  Date of Discharge:  10/17/2023  Postop nausea vomiting -improved  Primary osteoarthritis right knee status post right total knee arthroplasty -continue pain medicine, DVT prophylaxis  History of hypertension -resume home antihypertensives upon discharge  ... Patient ended up working well with physical therapy today, patient believes she can return home safely.  Patient will require home health services at discharge, patient will follow-up with orthopedic surgery in 2 weeks.     Review of Systems   Constitutional:  Negative for appetite change, fatigue and fever.   HENT:  Negative for congestion and ear pain.    Eyes:  Negative for blurred vision.   Respiratory:  Negative for cough, chest tightness, shortness of breath and wheezing.    Cardiovascular:  Negative for chest pain, palpitations and leg swelling.   Gastrointestinal:  Negative for abdominal pain.   Genitourinary:  Negative for difficulty urinating, dysuria and hematuria.   Musculoskeletal:  Negative for arthralgias and gait problem.   Skin:  Negative for skin lesions.   Neurological:  Negative for syncope, memory problem and confusion.   Psychiatric/Behavioral:  Negative for self-injury and depressed mood.        Objective   Vital Signs:   /80   Pulse 100   Temp 97.9 °F (36.6 °C)   Ht 157.5 cm (62.01\")   " Wt 60.3 kg (133 lb)   SpO2 96%   BMI 24.32 kg/m²           Physical Exam  Vitals and nursing note reviewed.   Constitutional:       General: She is not in acute distress.     Appearance: Normal appearance. She is not toxic-appearing.   HENT:      Head: Atraumatic.      Right Ear: External ear normal.      Left Ear: External ear normal.      Nose: Nose normal.      Mouth/Throat:      Mouth: Mucous membranes are moist.   Eyes:      General:         Right eye: No discharge.         Left eye: No discharge.      Extraocular Movements: Extraocular movements intact.      Pupils: Pupils are equal, round, and reactive to light.   Cardiovascular:      Rate and Rhythm: Normal rate and regular rhythm.      Pulses: Normal pulses.      Heart sounds: Normal heart sounds. No murmur heard.     No gallop.   Pulmonary:      Effort: Pulmonary effort is normal. No respiratory distress.      Breath sounds: No wheezing, rhonchi or rales.   Abdominal:      General: There is no distension.      Palpations: Abdomen is soft. There is no mass.      Tenderness: There is no abdominal tenderness. There is no guarding.   Musculoskeletal:         General: No swelling or tenderness.      Cervical back: No tenderness.      Right lower leg: Edema present.      Left lower leg: No edema.      Comments: Good 2+ edema post-op R TKR.   Skin:     General: Skin is warm and dry.      Findings: No rash.   Neurological:      General: No focal deficit present.      Mental Status: She is alert and oriented to person, place, and time. Mental status is at baseline.      Motor: No weakness.      Gait: Gait normal.   Psychiatric:         Mood and Affect: Mood normal.         Thought Content: Thought content normal.          Result Review :   The following data was reviewed by: Topher Perera MD on 10/21/2021:                  Assessment and Plan    Diagnoses and all orders for this visit:    1. Hospital discharge follow-up (Primary)  Assessment & Plan:  Patient was  admitted on 10/16/2023 for right TKR.  She had some issues with postoperative nausea, and now with possible mild cellulitis along with marked swelling postoperatively.  She is currently active with home health PT, and likely should continue with that for another several week  She be seen by orthopedics tomorrow for staple removal, defer to their expertise.  H&P and discharge summaries were reviewed, please see other headings this office visit for further details.      2. Essential hypertension  Assessment & Plan:  Blood pressure stable as of her 10/23 hospital follow-up visit.  Despite postoperative anemia, she has been able to stay on full dose amlodipine.  We can give her a few doses of Lasix to see if we can help with some of the right lower extremity swelling, told her to monitor her blood pressure while she is on this as well.    Orders:  -     Renal Function Panel; Future    3. Acute postoperative anemia due to expected blood loss  Assessment & Plan:  Hemoglobin was down from 12.3 to 9.6 postop.  Patient is a little tachycardic today, will repeat the H&H today and make further recommendations at that time.    Orders:  -     Ferritin; Future  -     CBC & Differential; Future  -     Iron Profile; Future    4. S/P total knee replacement, right  Assessment & Plan:  Patient with marked swelling right lower extremity.  We will try a few days of Lasix, patient to call with results, may continue this for short time.  Of note, she was ruled out for DVT last week.  Additionally is on Keflex for possible associated cellulitis.      Other orders  -     Pneumococcal Conjugate Vaccine 20-Valent (PCV20); Future  -     nystatin (MYCOSTATIN) 100,000 unit/mL suspension; Swish and swallow 5 mL 4 (Four) Times a Day for 6 days.  Dispense: 120 mL; Refill: 0  -     furosemide (Lasix) 20 MG tablet; Take 1 tablet by mouth Daily for 3 days.  Dispense: 3 tablet; Refill: 0           --  --  OLDER NOTES:  URGENT VISIT 10/20:  L NECK PAIN  just like 12/16 note below; I d/w C-spine series and take aleve 1 tab bid---> signif DJD as expected; did not have to stay on aleve; no radiation;   TACHYCARDIA = HR up 70 to 90+; has CBC/TSH conrad, so will review them when done and eval further on RTO; no CP...TSH neg, CBC with Hgb 11.2; since resolved; was related to Covid infection she said.  COVID-19 + on Labor Day.  --  VISIT 4/21 = above/below.  ANNUAL MEDICARE WELLNESS 4/21 = d/w all forms in office; no new discoveries; Narciso neg.  --  MILD ANEMIA as of 10/20 OV=11.2, down 1 gram past year; no bleeding; f/u on RTO---> Hgb 12 with nl iron as of 4/21, so defer f/u.  --  HTN remains controlled off ACEI as of 7/18 OV; bring machine in=150's at home (stopped ACEI due to severe angiodedema following bug bites); will use norvasc 2.5 if BP trends back up...start now 1/19...better 4/19---> holding 10/19 on these=ditto 4/21.  ?CKD3 = 53% in 4/20 = watch on RTO---> fine 10/20 and same 4/21.  --  LIPIDS at goal '12 w/o tx...ditto 8/15 with HDL 86..., but no tx unless event...141...121 with HDL 95, so defer tx 10/19---> , so I d/w get it back in the 120's please.  --  C/O R HIP PAIN...exam with excellent ROM, neg SLR, point tenderness c/w bursitis...try meds for now and call...better and then returned, no trauma, intermittent, worse up stairs, but able to mow/trim 5 acres, PTA for eval/HEP...worse after PT, s/p injection per ortho in [place] and prn mobic... HIP now, saw ortho in [place], story sounds radicular, but exam more c/w bursitis, so try medrol and keep using heat; also told me ortho was going to conrad MRI and I rec gabapentin again...MRI hip=bursitis and spine with DJD; things are better and only tolerating low dose gabapentin...stable off=ran out and no worse.  R KNEE PAIN=prior scopes; sx's flaring 1/18, but not severe yet, so no new tx rec...get steroid shots per Dr Vo prn---> L knee issues as of 4/21 and is in PT.  S/P R r TSR on 4/17/19  per Dr Vo...has completed rehab as of 10/19 OV; no pain meds needed.  --  SHINGLES rash is drying up, no secondary infection, but is very sensitive; c/w meds and will push dose...she's weaning dose, but still with sxs 6/17.  L NECK PAIN needing to take ASA daily now; ? CINDY vs carotid calcification, no bruit; needs CT and ? dopplers/etc; please call...it was neg.  BALANCE ISSUES past few months, just has to occ grab wall=needs HCT as well...it was neg  --  HYPERCALCEMIA S/P RESECTION 9/15... PTH 40...PTH 73 and is trending up, but Ca++ 8.8 is stable 1/18...PTH 48...40 in 10/19 and will stop checking.    OSTEOPOROSIS...BMD 1/18...spine -1.0, hip -2.4...intol to actonel so on Reclast #5/5 due 1/19---> BMD 10/20 = spine -1.2, hip -2.3 = stable.  VIT D DEF stable (on 2K qd)...55 in 1/19---> 58 in 10/20.  --  GERD/ABD PAIN...to ER 11/13 with CT=mild prominence of CBD and pancreatic duct, labs neg...no melena, no blood...sx's better with 2x PPI...rare sxs 1/18 on no meds, so call if recurrent and would use H2 first line...dysphagia as of 1/19 OV, to water at times, so needs MBSS to start...non-issue.  --  DEPRESSION/INSOMNIA with 's passing...try trazodone first, SSRI if no benefit or intol...with prn ambien...better with PRN restoril.   --  PETICHIAL LESIONS 8/19 on legs=she showed me pictures at 10/19 OV; said it was non-blanchable, no itch, + stinging though; lasted 3-4 days, started in one spot on LLE and spread; I d/w call when recurs and will get labs.  FACIAL SWELLING 4/18 =nasal and bilateral inner eyelids/etc; top of scalp with scabbed area, but no tick/etc; associated erythema; lungs clear, no dysphagia; will treat with abx as well as prednisone and take benadryl as well... recurred 2 weeks later, tried ice/benaryl, to ER, given keflex/bactrim/steroid shot; no dysphagia nor breathing issues with either episode; did have mosquito bite; pics reviewed; might as well finish out abx; rec no ACEI and zyrtec  10 mg bid for now; wear bug sprays if going out.  --  --  MMG 11/22/19 per me and is pending as of 4/21 OV.  EGD 7/12...erosive gastritis.  COLON 7/12...normal...defer.   REFUSES flu/pneumo; Shingrix x2;  ( '12...after 57 yrs, 4 boys with 1 here and 2 in [place], 1 in ---> had 47 for holidays q year for both holidays=has 22 great-grand already).    Follow Up   No follow-ups on file.  Patient was given instructions and counseling regarding her condition or for health maintenance advice. Please see specific information pulled into the AVS if appropriate.

## 2023-10-30 NOTE — ASSESSMENT & PLAN NOTE
Patient with marked swelling right lower extremity.  We will try a few days of Lasix, patient to call with results, may continue this for short time.  Of note, she was ruled out for DVT last week.  Additionally is on Keflex for possible associated cellulitis.

## 2023-10-30 NOTE — ASSESSMENT & PLAN NOTE
Blood pressure stable as of her 10/23 hospital follow-up visit.  Despite postoperative anemia, she has been able to stay on full dose amlodipine.  We can give her a few doses of Lasix to see if we can help with some of the right lower extremity swelling, told her to monitor her blood pressure while she is on this as well.

## 2023-10-30 NOTE — ASSESSMENT & PLAN NOTE
Patient was admitted on 10/16/2023 for right TKR.  She had some issues with postoperative nausea, and now with possible mild cellulitis along with marked swelling postoperatively.  She is currently active with home health PT, and likely should continue with that for another several week  She be seen by orthopedics tomorrow for staple removal, defer to their expertise.  H&P and discharge summaries were reviewed, please see other headings this office visit for further details.

## 2023-10-30 NOTE — ASSESSMENT & PLAN NOTE
Hemoglobin was down from 12.3 to 9.6 postop.  Patient is a little tachycardic today, will repeat the H&H today and make further recommendations at that time.

## 2023-10-31 ENCOUNTER — TELEPHONE (OUTPATIENT)
Dept: INTERNAL MEDICINE | Facility: CLINIC | Age: 83
End: 2023-10-31
Payer: MEDICARE

## 2023-10-31 ENCOUNTER — OFFICE VISIT (OUTPATIENT)
Dept: ORTHOPEDIC SURGERY | Facility: CLINIC | Age: 83
End: 2023-10-31
Payer: MEDICARE

## 2023-10-31 VITALS
SYSTOLIC BLOOD PRESSURE: 154 MMHG | DIASTOLIC BLOOD PRESSURE: 93 MMHG | OXYGEN SATURATION: 96 % | HEIGHT: 62 IN | HEART RATE: 105 BPM | BODY MASS INDEX: 24.48 KG/M2 | WEIGHT: 133 LBS

## 2023-10-31 DIAGNOSIS — Z96.651 AFTERCARE FOLLOWING RIGHT KNEE JOINT REPLACEMENT SURGERY: ICD-10-CM

## 2023-10-31 DIAGNOSIS — M25.561 RIGHT KNEE PAIN, UNSPECIFIED CHRONICITY: Primary | ICD-10-CM

## 2023-10-31 DIAGNOSIS — D50.9 IRON DEFICIENCY ANEMIA, UNSPECIFIED IRON DEFICIENCY ANEMIA TYPE: Primary | ICD-10-CM

## 2023-10-31 DIAGNOSIS — Z47.1 AFTERCARE FOLLOWING RIGHT KNEE JOINT REPLACEMENT SURGERY: ICD-10-CM

## 2023-10-31 LAB — FERRITIN SERPL-MCNC: 197 NG/ML (ref 13–150)

## 2023-10-31 RX ORDER — FERROUS SULFATE 325(65) MG
325 TABLET ORAL
COMMUNITY

## 2023-10-31 NOTE — PROGRESS NOTES
"Chief Complaint  Follow-up of the Right Knee    Subjective      Breanna York presents to [facility] for follow up of her right knee.  Patient is 2 weeks postop right total knee arthroplasty performed Dr. Vo on 10/16/23.  She presents today with use of walker.  She has 2 remaining days of antibiotics and has noted improvement to right knee erythema and edema.  She has remained in home health physical therapy, although anticipates discharge to outpatient physical therapy later this week.    Allergies   Allergen Reactions    Ace Inhibitors Angioedema     This occurred after some insect bites, but still need to defer this class of medications going forward    Meperidine Hallucinations    Latex Rash       Objective     Vital Signs:   Vitals:    10/31/23 1349   BP: 154/93   Pulse: 105   SpO2: 96%   Weight: 60.3 kg (133 lb)   Height: 157.5 cm (62\")   PainSc:   5     Body mass index is 24.33 kg/m².    I reviewed the patient's chief complaint, history of present illness, review of systems, past medical history, surgical history, family history, social history, medications, and allergy list.     REVIEW OF SYSTEMS    Constitutional: Denies fevers, chills, weight loss  Cardiovascular: Denies chest pain, shortness of breath  Skin: Denies rashes, acute skin changes  Neurologic: Denies headache, loss of consciousness  MSK: Right knee pain.     Ortho Exam  Knee General: Alert, no acute distress.   Right knee: No pain with passive hip ROM. staples removed today in office without complication. Steri-strips applied, incision is clean, dry and intact, without swelling, redness or drainage.  There is moderate edema and ecchymosis present.  Patella is well tracking and knee is stable to varus and valgus stress.  Full knee extension and flexion to 95 degrees.  Full plantarflexion and dorsiflexion of the ankle.  Sensation intact light touch.  Distal neurovascular intact.          Imaging Results (Most " Recent)       Procedure Component Value Units Date/Time    XR Knee 3 View Right [[id number]] Resulted: 11/02/23 1244     Updated: 11/02/23 1247    Narrative:      X-Ray Report:  Study: X-rays ordered, taken in the office, and reviewed today.   Site: Right knee Xray  Indication: TKA  View: AP/Lateral, Standing, and Kibler view(s)  Findings: Intact right total knee arthroplasty. No evidence of hardware   malfunction.   Prior studies available for comparison: yes                 Assessment and Plan     Diagnoses and all orders for this visit:    1. Right knee pain, unspecified chronicity (Primary)  -     XR Knee 3 View Right    2. Aftercare following right knee joint replacement surgery  -     Ambulatory Referral to Home Health (Outpatient)       Breanna York presents today 2 weeks post op right total knee arthroplasty performed by Dr. Vo on 10/16/2023. X-rays were reviewed with the patient today. staples removed today without complications. Incisions are well healing without active drainage or redness noted. No concerning signs of infection. Patient denies fever or chills. Incision site care was reviewed today. Patient instructed not to soak or submerge incisions. Do not apply any lotions, creams, or ointments to the incisions at this time.  We discussed concerning signs and symptoms regarding the incision sites.  Patient understood and agreed. Patient instructed to continue with formal physical therapy as well as home exercises. We discussed the importance of these exercises to improve range of motion. We discussed swelling management with ice and elevation. We discussed the importance of weaning from narcotic medications. Patient expressed understanding.     Patient will follow-up in 4 weeks for reevaluation.      Call or return if symptoms worsen or patient has any concerns.      Tobacco Use: Low Risk  (10/31/2023)    Patient History     Smoking Tobacco Use: Never     Smokeless Tobacco Use: Never     Passive  Exposure: Not on file     Patient reports that they are a nonsmoker; cessation education not applicable.     BMI is within normal parameters. No other follow-up for BMI required.    Follow Up   Return in about 4 weeks (around 11/28/2023).  Patient Instructions   X-rays taken and reviewed, showing intact hardware.    Staples removed in office and Steri-Strips applied.  Patient educated on incision care.  Please keep incision clean and dry.  Do not soak or submerge in water until incision is fully healed.  Do not apply creams or lotions over the incision.  Please allow Steri-Strips to fall off on their own within 7 to 10 days.    Continue icing and elevation of the knee as needed to help with pain and swelling.  Ice knee up to 3 or 4 times daily for no longer than 15 to 20 minutes at a time.    Continue PT to progress ROM, strength, and weightbearing status.    Follow-up in 4 weeks. Repeat x-rays not needed at this visit.  Please call with questions or concerns.    Patient was given instructions and counseling regarding her condition or for health maintenance advice. Please see specific information pulled into the AVS if appropriate.

## 2023-10-31 NOTE — TELEPHONE ENCOUNTER
----- Message from Topher Perera MD sent at 10/30/2023 11:40 PM EDT -----  Tell pt to start on Ferrous sulfate 325 mg daily.  Also place orders for CBC/Fe/TIBC/Ferritin in 6 weeks = pt to call for results.

## 2023-11-06 ENCOUNTER — TELEPHONE (OUTPATIENT)
Dept: ORTHOPEDIC SURGERY | Facility: CLINIC | Age: 83
End: 2023-11-06
Payer: MEDICARE

## 2023-11-06 DIAGNOSIS — Z96.651 AFTERCARE FOLLOWING RIGHT KNEE JOINT REPLACEMENT SURGERY: Primary | ICD-10-CM

## 2023-11-06 DIAGNOSIS — M25.561 RIGHT KNEE PAIN, UNSPECIFIED CHRONICITY: ICD-10-CM

## 2023-11-06 DIAGNOSIS — Z47.1 AFTERCARE FOLLOWING RIGHT KNEE JOINT REPLACEMENT SURGERY: Primary | ICD-10-CM

## 2023-11-06 RX ORDER — OXYCODONE AND ACETAMINOPHEN 7.5; 325 MG/1; MG/1
1 TABLET ORAL EVERY 4 HOURS PRN
Qty: 42 TABLET | Refills: 0 | Status: SHIPPED | OUTPATIENT
Start: 2023-11-06 | End: 2023-11-06 | Stop reason: SDUPTHER

## 2023-11-06 NOTE — TELEPHONE ENCOUNTER
PATIENT IS WANTING TO KNOW IF  SHE CAN DRIVE AND START OUT PATIENT PHYSICAL THERAPY.  PATIENT STATES SHE IS ONLY TAKING OCC PAIN MEDS. AND SHE HAS A 90 DEGREE BEND ON LEG.

## 2023-11-07 RX ORDER — OXYCODONE AND ACETAMINOPHEN 7.5; 325 MG/1; MG/1
1 TABLET ORAL EVERY 4 HOURS PRN
Qty: 42 TABLET | Refills: 0 | Status: SHIPPED | OUTPATIENT
Start: 2023-11-07

## 2023-11-09 DIAGNOSIS — G89.29 CHRONIC PAIN WITH DRUG DEPENDENCE: ICD-10-CM

## 2023-11-09 DIAGNOSIS — F19.20 CHRONIC PAIN WITH DRUG DEPENDENCE: ICD-10-CM

## 2023-11-12 RX ORDER — GABAPENTIN 100 MG/1
CAPSULE ORAL
Qty: 180 CAPSULE | Refills: 1 | Status: SHIPPED | OUTPATIENT
Start: 2023-11-12

## 2023-11-28 ENCOUNTER — TELEPHONE (OUTPATIENT)
Dept: ORTHOPEDIC SURGERY | Facility: CLINIC | Age: 83
End: 2023-11-28
Payer: MEDICARE

## 2023-11-28 DIAGNOSIS — Z96.651 AFTERCARE FOLLOWING RIGHT KNEE JOINT REPLACEMENT SURGERY: Primary | ICD-10-CM

## 2023-11-28 DIAGNOSIS — Z47.1 AFTERCARE FOLLOWING RIGHT KNEE JOINT REPLACEMENT SURGERY: Primary | ICD-10-CM

## 2023-11-28 NOTE — TELEPHONE ENCOUNTER
----- Message from Bari Miranda sent at 11/28/2023 12:12 PM EST -----  Regarding: DR SOLORZANO PATIENT -PTA REQUESTING UP DATED PT ORDER  DR SOLORZANO PATIENT -PTA REQUESTING UP DATED PT ORDER

## 2023-11-30 ENCOUNTER — OFFICE VISIT (OUTPATIENT)
Dept: ORTHOPEDIC SURGERY | Facility: CLINIC | Age: 83
End: 2023-11-30
Payer: MEDICARE

## 2023-11-30 VITALS — BODY MASS INDEX: 24.48 KG/M2 | WEIGHT: 133 LBS | HEIGHT: 62 IN

## 2023-11-30 DIAGNOSIS — G89.29 CHRONIC PAIN OF RIGHT KNEE: ICD-10-CM

## 2023-11-30 DIAGNOSIS — Z47.1 AFTERCARE FOLLOWING RIGHT KNEE JOINT REPLACEMENT SURGERY: Primary | ICD-10-CM

## 2023-11-30 DIAGNOSIS — M25.561 CHRONIC PAIN OF RIGHT KNEE: ICD-10-CM

## 2023-11-30 DIAGNOSIS — Z96.651 AFTERCARE FOLLOWING RIGHT KNEE JOINT REPLACEMENT SURGERY: Primary | ICD-10-CM

## 2023-11-30 NOTE — PATIENT INSTRUCTIONS
Patient with complaints of persistent knee pain.  Will send Rx for trial of topical gabapentin compound, as well as order for Zynex unit. Advised to continue PT to completion to progress strength and ROM. Continue home exercises.     Continue icing knee as needed up to 4 times daily for no longer than 15-20 mins at a time.     Follow-up in 6 weeks. Repeat x-rays needed. Call with changes or concerns.

## 2023-11-30 NOTE — PROGRESS NOTES
"Chief Complaint  Follow-up and Pain of the Right Knee    Subjective      Breanna York presents to [facility] for follow-up of right total knee arthroplasty performed on 10/16/2023 by Dr. Vo.  She presents today with use of cane.  She reports \"I do not understand why I am still in so much pain\".  She reports persistent pain with going from a seated to standing position and with knee flexion.  She localizes pain to the posterior lateral aspect of her knee, as well as medial joint line.  She remains in outpatient physical therapy through [facility] in [facility] three times weekly.     Objective   Allergies   Allergen Reactions    Ace Inhibitors Angioedema     This occurred after some insect bites, but still need to defer this class of medications going forward    Meperidine Hallucinations    Latex Rash       Vital Signs:   Ht 157.5 cm (62\")   Wt 60.3 kg (133 lb)   BMI 24.33 kg/m²       Physical Exam    Constitutional: Awake, alert. Well nourished appearance.    Integumentary: Warm, dry, intact. No obvious rashes.    HENT: Atraumatic, normocephalic.   Respiratory: Non labored respirations .   Cardiovascular: Intact peripheral pulses.    Psychiatric: Normal mood and affect. A&O X3    Ortho Exam  Right knee: Skin is warm, dry, and intact.  Well-healed surgical scar noted.  Moderate edema.  Tenderness to palpation of posterior lateral right knee.  Full knee extension and flexion to 110 degrees with overpressure.  Full plantarflexion and dorsiflexion of the ankle.  Sensation and distal neurovascular intact.    Imaging Results (Most Recent)       None                      Assessment and Plan   Problem List Items Addressed This Visit    None  Visit Diagnoses       Aftercare following right knee joint replacement surgery    -  Primary    Chronic pain of right knee                Follow Up   Return in about 6 weeks (around 1/11/2024).    Tobacco Use: Low Risk  (11/30/2023)    " Patient History     Smoking Tobacco Use: Never     Smokeless Tobacco Use: Never     Passive Exposure: Not on file     Patient is a non-smoker.  Did not discuss tobacco cessation options.    Patient Instructions   Patient with complaints of persistent knee pain.  Will send Rx for trial of topical gabapentin compound, as well as order for Zynex unit. Advised to continue PT to completion to progress strength and ROM. Continue home exercises.     Continue icing knee as needed up to 4 times daily for no longer than 15-20 mins at a time.     Follow-up in 6 weeks. Repeat x-rays needed. Call with changes or concerns.     Patient was given instructions and counseling regarding her condition or for health maintenance advice. Please see specific information pulled into the AVS if appropriate.

## 2023-12-06 NOTE — TELEPHONE ENCOUNTER
[facility] CALLED AND STATED PATIENT REACHED OUT TO THEM ABOUT GETTING HER PAIN CREAM BUT THEY DID NOT HAVE AN ORDER FOR IT.   PATIENT LAST SEEN IN OFFICE ON 11/30. TONIA WROTE ON CHECK OUT FORM. DID NOT SEE WHERE IT HAS BEEN SENT IN. PLEASE ADVISE.

## 2023-12-12 ENCOUNTER — LAB (OUTPATIENT)
Dept: INTERNAL MEDICINE | Facility: CLINIC | Age: 83
End: 2023-12-12
Payer: MEDICARE

## 2023-12-12 DIAGNOSIS — D50.9 IRON DEFICIENCY ANEMIA, UNSPECIFIED IRON DEFICIENCY ANEMIA TYPE: ICD-10-CM

## 2023-12-12 LAB
BASOPHILS # BLD AUTO: 0.07 10*3/MM3 (ref 0–0.2)
BASOPHILS NFR BLD AUTO: 1.2 % (ref 0–1.5)
DEPRECATED RDW RBC AUTO: 41.7 FL (ref 37–54)
EOSINOPHIL # BLD AUTO: 0.09 10*3/MM3 (ref 0–0.4)
EOSINOPHIL NFR BLD AUTO: 1.6 % (ref 0.3–6.2)
ERYTHROCYTE [DISTWIDTH] IN BLOOD BY AUTOMATED COUNT: 12.7 % (ref 12.3–15.4)
FERRITIN SERPL-MCNC: 124 NG/ML (ref 13–150)
HCT VFR BLD AUTO: 34.1 % (ref 34–46.6)
HGB BLD-MCNC: 11.2 G/DL (ref 12–15.9)
IMM GRANULOCYTES # BLD AUTO: 0.01 10*3/MM3 (ref 0–0.05)
IMM GRANULOCYTES NFR BLD AUTO: 0.2 % (ref 0–0.5)
IRON 24H UR-MRATE: 63 MCG/DL (ref 37–145)
IRON SATN MFR SERPL: 17 % (ref 20–50)
LYMPHOCYTES # BLD AUTO: 1.49 10*3/MM3 (ref 0.7–3.1)
LYMPHOCYTES NFR BLD AUTO: 25.7 % (ref 19.6–45.3)
MCH RBC QN AUTO: 30 PG (ref 26.6–33)
MCHC RBC AUTO-ENTMCNC: 32.8 G/DL (ref 31.5–35.7)
MCV RBC AUTO: 91.4 FL (ref 79–97)
MONOCYTES # BLD AUTO: 0.43 10*3/MM3 (ref 0.1–0.9)
MONOCYTES NFR BLD AUTO: 7.4 % (ref 5–12)
NEUTROPHILS NFR BLD AUTO: 3.7 10*3/MM3 (ref 1.7–7)
NEUTROPHILS NFR BLD AUTO: 63.9 % (ref 42.7–76)
NRBC BLD AUTO-RTO: 0 /100 WBC (ref 0–0.2)
PLATELET # BLD AUTO: 297 10*3/MM3 (ref 140–450)
PMV BLD AUTO: 10.3 FL (ref 6–12)
RBC # BLD AUTO: 3.73 10*6/MM3 (ref 3.77–5.28)
TIBC SERPL-MCNC: 367 MCG/DL (ref 298–536)
TRANSFERRIN SERPL-MCNC: 246 MG/DL (ref 200–360)
WBC NRBC COR # BLD AUTO: 5.79 10*3/MM3 (ref 3.4–10.8)

## 2023-12-12 PROCEDURE — 84466 ASSAY OF TRANSFERRIN: CPT | Performed by: INTERNAL MEDICINE

## 2023-12-12 PROCEDURE — 82728 ASSAY OF FERRITIN: CPT | Performed by: INTERNAL MEDICINE

## 2023-12-12 PROCEDURE — 83540 ASSAY OF IRON: CPT | Performed by: INTERNAL MEDICINE

## 2023-12-12 PROCEDURE — 85025 COMPLETE CBC W/AUTO DIFF WBC: CPT | Performed by: INTERNAL MEDICINE

## 2023-12-12 PROCEDURE — 36415 COLL VENOUS BLD VENIPUNCTURE: CPT | Performed by: INTERNAL MEDICINE

## 2023-12-14 ENCOUNTER — OFFICE VISIT (OUTPATIENT)
Dept: ORTHOPEDIC SURGERY | Facility: CLINIC | Age: 83
End: 2023-12-14
Payer: MEDICARE

## 2023-12-14 VITALS
WEIGHT: 123 LBS | DIASTOLIC BLOOD PRESSURE: 85 MMHG | HEART RATE: 112 BPM | OXYGEN SATURATION: 95 % | HEIGHT: 63 IN | SYSTOLIC BLOOD PRESSURE: 160 MMHG | BODY MASS INDEX: 21.79 KG/M2

## 2023-12-14 DIAGNOSIS — Z47.1 AFTERCARE FOLLOWING RIGHT KNEE JOINT REPLACEMENT SURGERY: ICD-10-CM

## 2023-12-14 DIAGNOSIS — M25.562 LEFT KNEE PAIN, UNSPECIFIED CHRONICITY: Primary | ICD-10-CM

## 2023-12-14 DIAGNOSIS — Z96.651 AFTERCARE FOLLOWING RIGHT KNEE JOINT REPLACEMENT SURGERY: ICD-10-CM

## 2023-12-14 DIAGNOSIS — M17.12 OSTEOARTHRITIS OF LEFT KNEE, UNSPECIFIED OSTEOARTHRITIS TYPE: ICD-10-CM

## 2023-12-14 RX ORDER — MELOXICAM 15 MG/1
15 TABLET ORAL DAILY
Qty: 30 TABLET | Refills: 1 | Status: SHIPPED | OUTPATIENT
Start: 2023-12-14

## 2023-12-14 NOTE — PROGRESS NOTES
"Chief Complaint  Follow-up and Pain of the Right Knee and Pain and Follow-up of the Left Knee     Subjective      Breanna York presents to [facility] for follow up of bilateral knees.  She had a right total knee arthroplasty on 10/16/23.  She is in physical therapy and having continued pain with movement. She has pain on the lateral aspect of the knee and the medial aspect of the knee especially with sleeping.  She ambulates with a cane.  She is not taking any anti inflammatories. She is here for a left knee steroid injection.  The last one was 6/8/23.     Allergies   Allergen Reactions    Ace Inhibitors Angioedema     This occurred after some insect bites, but still need to defer this class of medications going forward    Meperidine Hallucinations    Latex Rash        Social History     Socioeconomic History    Marital status:    Tobacco Use    Smoking status: Never    Smokeless tobacco: Never   Vaping Use    Vaping Use: Never used   Substance and Sexual Activity    Alcohol use: Never    Drug use: Never    Sexual activity: Defer        I reviewed the patient's chief complaint, history of present illness, review of systems, past medical history, surgical history, family history, social history, medications, and allergy list.     Review of Systems     Constitutional: Denies fevers, chills, weight loss  Cardiovascular: Denies chest pain, shortness of breath  Skin: Denies rashes, acute skin changes  Neurologic: Denies headache, loss of consciousness        Vital Signs:   /85   Pulse 112   Ht 160 cm (63\")   Wt 55.8 kg (123 lb)   SpO2 95%   BMI 21.79 kg/m²          Physical Exam  General: Alert. No acute distress    Ortho Exam        BILATERAL KNEES No evidence of wound dehiscence, surrounding erythema, warmth, or drainage of the right knee. Flexion 106 RIGHT 110 LEFT. Extension -6 RIGHT -3 LEFT. Stable to varus/valgus stress. Stable to anterior/posterior drawer. " Neurovascularly intact. Positive EHL, FHL, HS and TA. Sensation intact to light touch all 5 nerves of the foot. Ambulates with Antalgic gait. Patella is well tracking. Calf supple, non-tender.Good strength to hamstrings, quadriceps, dorsiflexors, and plantar flexors.  Knee Extensor Mechanism intact Tender in the back of the right knee. Mild swelling.       Procedures      Imaging Results (Most Recent)       Procedure Component Value Units Date/Time    XR Knee 3 View Left [[id number]] Resulted: 12/14/23 1323     Updated: 12/14/23 1326             Result Review :         X-Ray Report:  Left knee X-Ray  Indication: Evaluation of the left knee  AP/Lateral and [place] view(s)  Findings: Moderate arthritis of the left knee.   Prior studies available for comparison: yes        Assessment and Plan     Diagnoses and all orders for this visit:    1. Left knee pain, unspecified chronicity (Primary)  -     XR Knee 3 View Left    2. Aftercare following right knee joint replacement surgery    3. Osteoarthritis of left knee, unspecified osteoarthritis type        Discussed the treatment plan with the patient. I reviewed the X-rays that were obtained today with the patient.     Prescribed anti inflammatory.       Call or return if worsening symptoms.    Follow Up     4-6 weeks for right knee postoperative care.       Patient was given instructions and counseling regarding her condition or for health maintenance advice. Please see specific information pulled into the AVS if appropriate.     Scribed for Candice Vo MD by Elidia Pineda MA.  12/14/23   13:24 EST    I have personally performed the services described in this document as scribed by the above individual and it is both accurate and complete. Candice Vo MD 12/14/23

## 2024-01-04 ENCOUNTER — TELEPHONE (OUTPATIENT)
Dept: ORTHOPEDIC SURGERY | Facility: CLINIC | Age: 84
End: 2024-01-04
Payer: MEDICARE

## 2024-01-04 DIAGNOSIS — Z47.1 AFTERCARE FOLLOWING RIGHT KNEE JOINT REPLACEMENT SURGERY: ICD-10-CM

## 2024-01-04 DIAGNOSIS — Z96.651 AFTERCARE FOLLOWING RIGHT KNEE JOINT REPLACEMENT SURGERY: ICD-10-CM

## 2024-01-04 DIAGNOSIS — M25.562 LEFT KNEE PAIN, UNSPECIFIED CHRONICITY: Primary | ICD-10-CM

## 2024-01-04 DIAGNOSIS — M25.561 RIGHT KNEE PAIN, UNSPECIFIED CHRONICITY: ICD-10-CM

## 2024-01-04 NOTE — TELEPHONE ENCOUNTER
----- Message from Bari Miranda sent at 1/4/2024  8:46 AM EST -----  Regarding: DR SOLORZANO PATIENT-NEW PT ORDER PER MEDICARE  DR SOLORZANO PATIENT- NEEDS NEW PT ORDER PER MEDICARE- [facility] [place] [facility]

## 2024-01-18 ENCOUNTER — OFFICE VISIT (OUTPATIENT)
Dept: ORTHOPEDIC SURGERY | Facility: CLINIC | Age: 84
End: 2024-01-18
Payer: MEDICARE

## 2024-01-18 VITALS — WEIGHT: 118 LBS | HEIGHT: 63 IN | HEART RATE: 110 BPM | BODY MASS INDEX: 20.91 KG/M2 | OXYGEN SATURATION: 97 %

## 2024-01-18 DIAGNOSIS — Z47.1 AFTERCARE FOLLOWING RIGHT KNEE JOINT REPLACEMENT SURGERY: ICD-10-CM

## 2024-01-18 DIAGNOSIS — M25.561 RIGHT KNEE PAIN, UNSPECIFIED CHRONICITY: Primary | ICD-10-CM

## 2024-01-18 DIAGNOSIS — Z96.651 AFTERCARE FOLLOWING RIGHT KNEE JOINT REPLACEMENT SURGERY: ICD-10-CM

## 2024-01-18 NOTE — PROGRESS NOTES
"Chief Complaint  Follow-up and Pain of the Right Knee     Subjective      Breanna York presents to [facility] for follow up of the right knee. She had a right total knee arthroplasty on 10/16/23. She ambulates with a cane for support and stability.  She did therapy in [facility].  She has pain on the lateral aspect of the knee is the only complaint she has and reports 90 % improvement. Her ROM at therapy can get from 122-0.  She was put on Meloxicam 12/14/23 that has helped greatly.      Allergies   Allergen Reactions    Ace Inhibitors Angioedema     This occurred after some insect bites, but still need to defer this class of medications going forward    Meperidine Hallucinations    Latex Rash        Social History     Socioeconomic History    Marital status:    Tobacco Use    Smoking status: Never    Smokeless tobacco: Never   Vaping Use    Vaping Use: Never used   Substance and Sexual Activity    Alcohol use: Never    Drug use: Never    Sexual activity: Defer        I reviewed the patient's chief complaint, history of present illness, review of systems, past medical history, surgical history, family history, social history, medications, and allergy list.     Review of Systems     Constitutional: Denies fevers, chills, weight loss  Cardiovascular: Denies chest pain, shortness of breath  Skin: Denies rashes, acute skin changes  Neurologic: Denies headache, loss of consciousness        Vital Signs:   Pulse 110   Ht 160 cm (63\")   Wt 53.5 kg (118 lb)   SpO2 97%   BMI 20.90 kg/m²          Physical Exam  General: Alert. No acute distress    Ortho Exam      RIGHT KNEE Flexion 115. Extension 0. Stable to varus/valgus stress. Stable to anterior/posterior drawer. Neurovascularly intact. Positive EHL, FHL, HS and TA. Sensation intact to light touch all 5 nerves of the foot. Ambulates with Antalgic gait. Patella is well tracking. Calf supple, non-tender.  Good strength to " hamstrings, quadriceps, dorsiflexors, and plantar flexors.  Knee Extensor Mechanism intact       Procedures      Imaging Results (Most Recent)       Procedure Component Value Units Date/Time    XR Knee 3 View Right [[id number]] Resulted: 01/18/24 1317     Updated: 01/18/24 1319             Result Review :     X-Ray Report:  Right knee X-Ray  Indication: Evaluation of the right knee  AP/Lateral and [place] view(s)  Findings: Intact right total knee arthroplasty.  No signs of loosening, subsidence or periprosthetic fracture.   Prior studies available for comparison: yes             Assessment and Plan     Diagnoses and all orders for this visit:    1. Right knee pain, unspecified chronicity (Primary)  -     XR Knee 3 View Right    2. Aftercare following right knee joint replacement surgery        Discussed the treatment plan with the patient. I reviewed the X-rays that were obtained today with the patient.    Continue physical therapy as needed.  Transition to home exercises.        Call or return if worsening symptoms.    Follow Up     6 months.     Patient was given instructions and counseling regarding her condition or for health maintenance advice. Please see specific information pulled into the AVS if appropriate.     Scribed for Candice Vo MD by Elidia Pineda MA.  01/18/24   13:13 EST    I have personally performed the services described in this document as scribed by the above individual and it is both accurate and complete. Candice Vo MD 01/18/24

## 2024-01-29 ENCOUNTER — TELEPHONE (OUTPATIENT)
Dept: INTERNAL MEDICINE | Facility: CLINIC | Age: 84
End: 2024-01-29
Payer: MEDICARE

## 2024-01-29 NOTE — TELEPHONE ENCOUNTER
Caller: Breanna York    Relationship to patient: Self    Best call back number: [phone]    Chief complaint: PATIENT STATES THAT SHE CAN FEEL HER HEART SKIPPING BEATS.  SHE TIMED IT ONE TIME AND IT SKIPPED 5 TIMES IN 50 SECONDS AND THEN 3 THE NEXT 50 SECONDS.     Patient directed to call 911 or go to their nearest emergency room.     Patient verbalized understanding: [x] Yes  [] No  If no, why?    Additional notes:PATIENT STATES TODAY WAS NOT FIRST DAY THIS HAS HAPPENED BUT IT WAS WORSE TODAY

## 2024-01-29 NOTE — TELEPHONE ENCOUNTER
Pt states that this started after her recent surgery, she is going to see Dr. Perera on Wednesday.

## 2024-01-30 ENCOUNTER — TELEPHONE (OUTPATIENT)
Dept: INTERNAL MEDICINE | Facility: CLINIC | Age: 84
End: 2024-01-30
Payer: MEDICARE

## 2024-01-30 NOTE — TELEPHONE ENCOUNTER
Caller: Breanna York    Relationship to patient: Self    Best call back number: [phone]    Chief complaint:     Type of visit: OFFICE VISIT/SAME DAY    Requested date: 01/30/2024     If rescheduling, when is the original appointment: 01/31/2024    Additional notes: PATIENT SPOKE WITH LYUDMILA AND SHE ADVISED PATIENT SHE COULD BE SEEN TODAY AT 3:15 IF HER SCHEDULE CHANGED AND IT DID  ATTEMPTED TO TRANSFER CALL AND WAS UNABLE   PLEASE CONTACT AND ADVISE

## 2024-01-31 ENCOUNTER — OFFICE VISIT (OUTPATIENT)
Dept: INTERNAL MEDICINE | Facility: CLINIC | Age: 84
End: 2024-01-31
Payer: MEDICARE

## 2024-01-31 VITALS
SYSTOLIC BLOOD PRESSURE: 152 MMHG | HEART RATE: 89 BPM | HEIGHT: 63 IN | WEIGHT: 121.8 LBS | TEMPERATURE: 98.2 F | OXYGEN SATURATION: 96 % | DIASTOLIC BLOOD PRESSURE: 80 MMHG | BODY MASS INDEX: 21.58 KG/M2

## 2024-01-31 DIAGNOSIS — R01.1 CHANGE IN HEART MURMUR: ICD-10-CM

## 2024-01-31 DIAGNOSIS — I10 ESSENTIAL HYPERTENSION: ICD-10-CM

## 2024-01-31 DIAGNOSIS — R00.2 PALPITATIONS: Primary | ICD-10-CM

## 2024-01-31 LAB
ANION GAP SERPL CALCULATED.3IONS-SCNC: 12 MMOL/L (ref 5–15)
BASOPHILS # BLD AUTO: 0.06 10*3/MM3 (ref 0–0.2)
BASOPHILS NFR BLD AUTO: 1.1 % (ref 0–1.5)
BUN SERPL-MCNC: 19 MG/DL (ref 8–23)
BUN/CREAT SERPL: 21.1 (ref 7–25)
CALCIUM SPEC-SCNC: 9.8 MG/DL (ref 8.6–10.5)
CHLORIDE SERPL-SCNC: 103 MMOL/L (ref 98–107)
CO2 SERPL-SCNC: 24 MMOL/L (ref 22–29)
CREAT SERPL-MCNC: 0.9 MG/DL (ref 0.57–1)
DEPRECATED RDW RBC AUTO: 45 FL (ref 37–54)
EGFRCR SERPLBLD CKD-EPI 2021: 63.6 ML/MIN/1.73
EOSINOPHIL # BLD AUTO: 0.15 10*3/MM3 (ref 0–0.4)
EOSINOPHIL NFR BLD AUTO: 2.8 % (ref 0.3–6.2)
ERYTHROCYTE [DISTWIDTH] IN BLOOD BY AUTOMATED COUNT: 13.2 % (ref 12.3–15.4)
GLUCOSE SERPL-MCNC: 82 MG/DL (ref 65–99)
HCT VFR BLD AUTO: 35.6 % (ref 34–46.6)
HGB BLD-MCNC: 11.8 G/DL (ref 12–15.9)
IMM GRANULOCYTES # BLD AUTO: 0.01 10*3/MM3 (ref 0–0.05)
IMM GRANULOCYTES NFR BLD AUTO: 0.2 % (ref 0–0.5)
LYMPHOCYTES # BLD AUTO: 1.67 10*3/MM3 (ref 0.7–3.1)
LYMPHOCYTES NFR BLD AUTO: 30.9 % (ref 19.6–45.3)
MAGNESIUM SERPL-MCNC: 2.3 MG/DL (ref 1.6–2.4)
MCH RBC QN AUTO: 30.5 PG (ref 26.6–33)
MCHC RBC AUTO-ENTMCNC: 33.1 G/DL (ref 31.5–35.7)
MCV RBC AUTO: 92 FL (ref 79–97)
MONOCYTES # BLD AUTO: 0.63 10*3/MM3 (ref 0.1–0.9)
MONOCYTES NFR BLD AUTO: 11.6 % (ref 5–12)
NEUTROPHILS NFR BLD AUTO: 2.89 10*3/MM3 (ref 1.7–7)
NEUTROPHILS NFR BLD AUTO: 53.4 % (ref 42.7–76)
NRBC BLD AUTO-RTO: 0 /100 WBC (ref 0–0.2)
PLATELET # BLD AUTO: 298 10*3/MM3 (ref 140–450)
PMV BLD AUTO: 10.3 FL (ref 6–12)
POTASSIUM SERPL-SCNC: 3.9 MMOL/L (ref 3.5–5.2)
RBC # BLD AUTO: 3.87 10*6/MM3 (ref 3.77–5.28)
SODIUM SERPL-SCNC: 139 MMOL/L (ref 136–145)
T4 FREE SERPL-MCNC: 1.15 NG/DL (ref 0.93–1.7)
TSH SERPL DL<=0.05 MIU/L-ACNC: 3.34 UIU/ML (ref 0.27–4.2)
WBC NRBC COR # BLD AUTO: 5.41 10*3/MM3 (ref 3.4–10.8)

## 2024-01-31 PROCEDURE — 84439 ASSAY OF FREE THYROXINE: CPT | Performed by: INTERNAL MEDICINE

## 2024-01-31 PROCEDURE — 85025 COMPLETE CBC W/AUTO DIFF WBC: CPT | Performed by: INTERNAL MEDICINE

## 2024-01-31 PROCEDURE — 99214 OFFICE O/P EST MOD 30 MIN: CPT | Performed by: INTERNAL MEDICINE

## 2024-01-31 PROCEDURE — 93000 ELECTROCARDIOGRAM COMPLETE: CPT | Performed by: INTERNAL MEDICINE

## 2024-01-31 PROCEDURE — 84443 ASSAY THYROID STIM HORMONE: CPT | Performed by: INTERNAL MEDICINE

## 2024-01-31 PROCEDURE — 83735 ASSAY OF MAGNESIUM: CPT | Performed by: INTERNAL MEDICINE

## 2024-01-31 PROCEDURE — 80048 BASIC METABOLIC PNL TOTAL CA: CPT | Performed by: INTERNAL MEDICINE

## 2024-01-31 RX ORDER — METOPROLOL SUCCINATE 25 MG/1
25 TABLET, EXTENDED RELEASE ORAL DAILY
Qty: 90 TABLET | Refills: 1 | Status: SHIPPED | OUTPATIENT
Start: 2024-01-31 | End: 2024-07-29

## 2024-01-31 NOTE — ASSESSMENT & PLAN NOTE
Blood pressure not optimally controlled here the past couple of months.  She is on full dose amlodipine only.  She previously was on an ACE inhibitor, but had severe angioedema in the past.  Heart rate is typically at least in the 70 ballpark, and here lately has been running higher.  Have plenty of room for low-dose beta-blocker, we will see what EKG reads first.    EKG with occasional PVCs as expected, will start low-dose beta-blocker to try to suppress these and help with blood pressure control.

## 2024-01-31 NOTE — PROGRESS NOTES
Chief Complaint  Heart skipping beats (Pt states that he heart is skipping beats, she states that it was worse last night and she propped herself up on the couch. She states that she can feel it in her head and ears. This has been going on since October. It is just getting worse. )    Subjective          Breanna York presents to [facility]     History of Present Illness  Pleasant 82 yo female with HTN, HYPERLIPIDEMIA, DJD, among others, coming in 10/23 for routine 6-month f/u.  We will go over her med list, review her labs in detail, and address any new concerns she may have.---> Patient here 1/24 for urgent issues as per chief complaint above.    ---> seen 10/23 for Hosp F/U:  Postop nausea vomiting -improved  Primary osteoarthritis right knee status post right total knee arthroplasty -continue pain medicine, DVT prophylaxis  History of hypertension -resume home antihypertensives upon discharge  ... Patient ended up working well with physical therapy today, patient believes she can return home safely.  Patient will require home health services at discharge, patient will follow-up with orthopedic surgery in 2 weeks.     Review of Systems   Constitutional:  Negative for appetite change, fatigue and fever.   HENT:  Negative for congestion and ear pain.    Eyes:  Negative for blurred vision.   Respiratory:  Negative for cough, chest tightness, shortness of breath and wheezing.    Cardiovascular:  Negative for chest pain, palpitations and leg swelling.   Gastrointestinal:  Negative for abdominal pain.   Genitourinary:  Negative for difficulty urinating, dysuria and hematuria.   Musculoskeletal:  Negative for arthralgias and gait problem.   Skin:  Negative for skin lesions.   Neurological:  Negative for syncope, memory problem and confusion.   Psychiatric/Behavioral:  Negative for self-injury and depressed mood.        Objective   Vital Signs:   /80   Pulse 89   Temp 98.2 °F  "(36.8 °C) (Skin)   Ht 160 cm (62.99\")   Wt 55.2 kg (121 lb 12.8 oz)   SpO2 96%   BMI 21.58 kg/m²           Physical Exam  Vitals and nursing note reviewed.   Constitutional:       General: She is not in acute distress.     Appearance: Normal appearance. She is not toxic-appearing.   HENT:      Head: Atraumatic.      Right Ear: External ear normal.      Left Ear: External ear normal.      Nose: Nose normal.      Mouth/Throat:      Mouth: Mucous membranes are moist.   Eyes:      General:         Right eye: No discharge.         Left eye: No discharge.      Extraocular Movements: Extraocular movements intact.      Pupils: Pupils are equal, round, and reactive to light.   Cardiovascular:      Rate and Rhythm: Normal rate and regular rhythm.      Pulses: Normal pulses.      Heart sounds: Normal heart sounds. No murmur heard.     No gallop.   Pulmonary:      Effort: Pulmonary effort is normal. No respiratory distress.      Breath sounds: No wheezing, rhonchi or rales.   Abdominal:      General: There is no distension.      Palpations: Abdomen is soft. There is no mass.      Tenderness: There is no abdominal tenderness. There is no guarding.   Musculoskeletal:         General: No swelling or tenderness.      Cervical back: No tenderness.      Right lower leg: Edema present.      Left lower leg: No edema.      Comments: Good 2+ edema post-op R TKR.   Skin:     General: Skin is warm and dry.      Findings: No rash.   Neurological:      General: No focal deficit present.      Mental Status: She is alert and oriented to person, place, and time. Mental status is at baseline.      Motor: No weakness.      Gait: Gait normal.   Psychiatric:         Mood and Affect: Mood normal.         Thought Content: Thought content normal.          Result Review :   The following data was reviewed by: Topher Perera MD on 10/21/2021:                  Assessment and Plan    Diagnoses and all orders for this visit:    1. Palpitations " (Primary)  Assessment & Plan:  This is indication for her 1/24 urgent visit.  Symptoms been going on for several months now, occurring nearly daily, noticed it particularly last night.  She has had no new issues, she did undergo knee replacement in 10/23, she is about recovered from that.  We already repeated a H&H 6 weeks or so postop and she had essentially returned to her normal hemoglobin.  She is on no over-the-counter supplements, no change in her caffeine intake etc.    On exam she appears to be in sinus but does have frequent ectopy.  EKG is pending.    EKG reveals normal sinus rhythm, she has occasional PVCs with compensatory pauses.  There is no ischemic changes, her EKG is unchanged versus preoperative 1 performed on 10/9/2023.    Plan is to initiate low-dose beta-blocker, check routine labs today, and get Holter and echo.    Orders:  -     ECG 12 Lead  -     Basic Metabolic Panel; Future  -     CBC & Differential; Future  -     Magnesium; Future  -     TSH+Free T4; Future  -     Adult Transthoracic Echo Complete W/ Cont if Necessary Per Protocol; Future  -     Holter Monitor - 24 Hour; Future    2. Essential hypertension  Assessment & Plan:  Blood pressure not optimally controlled here the past couple of months.  She is on full dose amlodipine only.  She previously was on an ACE inhibitor, but had severe angioedema in the past.  Heart rate is typically at least in the 70 ballpark, and here lately has been running higher.  Have plenty of room for low-dose beta-blocker, we will see what EKG reads first.    EKG with occasional PVCs as expected, will start low-dose beta-blocker to try to suppress these and help with blood pressure control.      3. Change in heart murmur  Assessment & Plan:  This appears a little more prominent as of her 1/24 OV.  She recalls being told she has had a heart murmur in the past.  We have never had reason to get an echo before, but given her new symptoms, will place order for  such.    Orders:  -     Adult Transthoracic Echo Complete W/ Cont if Necessary Per Protocol; Future    Other orders  -     metoprolol succinate XL (Toprol XL) 25 MG 24 hr tablet; Take 1 tablet by mouth Daily for 180 days.  Dispense: 90 tablet; Refill: 1             --  --  OLDER NOTES:  URGENT VISIT 10/20:  L NECK PAIN just like 12/16 note below; I d/w C-spine series and take aleve 1 tab bid---> signif DJD as expected; did not have to stay on aleve; no radiation;   TACHYCARDIA = HR up 70 to 90+; has CBC/TSH conrad, so will review them when done and eval further on RTO; no CP...TSH neg, CBC with Hgb 11.2; since resolved; was related to Covid infection she said.  COVID-19 + on Labor Day.  --  VISIT 4/21 = above/below.  ANNUAL MEDICARE WELLNESS 4/21 = d/w all forms in office; no new discoveries; Narciso neg.  --  MILD ANEMIA as of 10/20 OV=11.2, down 1 gram past year; no bleeding; f/u on RTO---> Hgb 12 with nl iron as of 4/21, so defer f/u.  --  HTN remains controlled off ACEI as of 7/18 OV; bring machine in=150's at home (stopped ACEI due to severe angiodedema following bug bites); will use norvasc 2.5 if BP trends back up...start now 1/19...better 4/19---> holding 10/19 on these=ditto 4/21.  ?CKD3 = 53% in 4/20 = watch on RTO---> fine 10/20 and same 4/21.  --  LIPIDS at goal '12 w/o tx...ditto 8/15 with HDL 86..., but no tx unless event...141...121 with HDL 95, so defer tx 10/19---> , so I d/w get it back in the 120's please.  --  C/O R HIP PAIN...exam with excellent ROM, neg SLR, point tenderness c/w bursitis...try meds for now and call...better and then returned, no trauma, intermittent, worse up stairs, but able to mow/trim 5 acres, PTA for eval/HEP...worse after PT, s/p injection per ortho in [place] and prn mobic... HIP now, saw ortho in [place], story sounds radicular, but exam more c/w bursitis, so try medrol and keep using heat; also told me ortho was going to [facility] and I rec gabapentin  again...MRI hip=bursitis and spine with DJD; things are better and only tolerating low dose gabapentin...stable off=ran out and no worse.  R KNEE PAIN=prior scopes; sx's flaring 1/18, but not severe yet, so no new tx rec...get steroid shots per Dr Vo prn---> L knee issues as of 4/21 and is in PT.  S/P R r TSR on 4/17/19 per Dr Vo...has completed rehab as of 10/19 OV; no pain meds needed.  --  SHINGLES rash is drying up, no secondary infection, but is very sensitive; c/w meds and will push dose...she's weaning dose, but still with sxs 6/17.  L NECK PAIN needing to take ASA daily now; ? CINDY vs carotid calcification, no bruit; needs CT and ? dopplers/etc; please call...it was neg.  BALANCE ISSUES past few months, just has to occ grab wall=needs HCT as well...it was neg  --  HYPERCALCEMIA S/P RESECTION 9/15... PTH 40...PTH 73 and is trending up, but Ca++ 8.8 is stable 1/18...PTH 48...40 in 10/19 and will stop checking.    OSTEOPOROSIS...BMD 1/18...spine -1.0, hip -2.4...intol to actonel so on Reclast #5/5 due 1/19---> BMD 10/20 = spine -1.2, hip -2.3 = stable.  VIT D DEF stable (on 2K qd)...55 in 1/19---> 58 in 10/20.  --  GERD/ABD PAIN...to ER 11/13 with CT=mild prominence of CBD and pancreatic duct, labs neg...no melena, no blood...sx's better with 2x PPI...rare sxs 1/18 on no meds, so call if recurrent and would use H2 first line...dysphagia as of 1/19 OV, to water at times, so needs MBSS to start...non-issue.  --  DEPRESSION/INSOMNIA with 's passing...try trazodone first, SSRI if no benefit or intol...with prn ambien...better with PRN restoril.   --  PETICHIAL LESIONS 8/19 on legs=she showed me pictures at 10/19 OV; said it was non-blanchable, no itch, + stinging though; lasted 3-4 days, started in one spot on LLE and spread; I d/w call when recurs and will get labs.  FACIAL SWELLING 4/18 =nasal and bilateral inner eyelids/etc; top of scalp with scabbed area, but no tick/etc; associated erythema; lungs  clear, no dysphagia; will treat with abx as well as prednisone and take benadryl as well... recurred 2 weeks later, tried ice/benaryl, to ER, given keflex/bactrim/steroid shot; no dysphagia nor breathing issues with either episode; did have mosquito bite; pics reviewed; might as well finish out abx; rec no ACEI and zyrtec 10 mg bid for now; wear bug sprays if going out.  --  --  MMG 11/22/19 per me and is pending as of 4/21 OV.  EGD 7/12...erosive gastritis.  COLON 7/12...normal...defer.   REFUSES flu/pneumo; Shingrix x2;  ( '12...after 57 yrs, 4 boys with 1 here and 2 in [place], 1 in ---> had 47 for holidays q year for both holidays=has 22 great-grand already).    Follow Up   Return in about 4 weeks (around 2/28/2024).  Patient was given instructions and counseling regarding her condition or for health maintenance advice. Please see specific information pulled into the AVS if appropriate.

## 2024-01-31 NOTE — ASSESSMENT & PLAN NOTE
This appears a little more prominent as of her 1/24 OV.  She recalls being told she has had a heart murmur in the past.  We have never had reason to get an echo before, but given her new symptoms, will place order for such.

## 2024-01-31 NOTE — ASSESSMENT & PLAN NOTE
This is indication for her 1/24 urgent visit.  Symptoms been going on for several months now, occurring nearly daily, noticed it particularly last night.  She has had no new issues, she did undergo knee replacement in 10/23, she is about recovered from that.  We already repeated a H&H 6 weeks or so postop and she had essentially returned to her normal hemoglobin.  She is on no over-the-counter supplements, no change in her caffeine intake etc.    On exam she appears to be in sinus but does have frequent ectopy.  EKG is pending.    EKG reveals normal sinus rhythm, she has occasional PVCs with compensatory pauses.  There is no ischemic changes, her EKG is unchanged versus preoperative 1 performed on 10/9/2023.    Plan is to initiate low-dose beta-blocker, check routine labs today, and get Holter and echo.

## 2024-02-07 DIAGNOSIS — M17.12 OSTEOARTHRITIS OF LEFT KNEE, UNSPECIFIED OSTEOARTHRITIS TYPE: ICD-10-CM

## 2024-02-07 DIAGNOSIS — Z47.1 AFTERCARE FOLLOWING RIGHT KNEE JOINT REPLACEMENT SURGERY: ICD-10-CM

## 2024-02-07 DIAGNOSIS — Z96.651 AFTERCARE FOLLOWING RIGHT KNEE JOINT REPLACEMENT SURGERY: ICD-10-CM

## 2024-02-07 DIAGNOSIS — M25.562 LEFT KNEE PAIN, UNSPECIFIED CHRONICITY: ICD-10-CM

## 2024-02-07 RX ORDER — MELOXICAM 15 MG/1
15 TABLET ORAL DAILY
Qty: 30 TABLET | Refills: 1 | Status: SHIPPED | OUTPATIENT
Start: 2024-02-07

## 2024-02-15 ENCOUNTER — HOSPITAL ENCOUNTER (OUTPATIENT)
Dept: MAMMOGRAPHY | Facility: HOSPITAL | Age: 84
Discharge: HOME | End: 2024-02-15
Admitting: INTERNAL MEDICINE
Payer: MEDICARE

## 2024-02-15 DIAGNOSIS — Z12.31 BREAST CANCER SCREENING BY MAMMOGRAM: ICD-10-CM

## 2024-02-15 PROCEDURE — 77063 BREAST TOMOSYNTHESIS BI: CPT

## 2024-02-15 PROCEDURE — 77067 SCR MAMMO BI INCL CAD: CPT

## 2024-02-19 ENCOUNTER — TELEPHONE (OUTPATIENT)
Dept: INTERNAL MEDICINE | Facility: CLINIC | Age: 84
End: 2024-02-19
Payer: MEDICARE

## 2024-02-19 DIAGNOSIS — Z12.31 ENCOUNTER FOR SCREENING MAMMOGRAM FOR MALIGNANT NEOPLASM OF BREAST: Primary | ICD-10-CM

## 2024-02-19 NOTE — TELEPHONE ENCOUNTER
----- Message from Topher Perera MD sent at 2/15/2024  5:56 PM EST -----  Please let patient know the mammogram was benign, and place order for repeat mammogram next year.

## 2024-02-19 NOTE — TELEPHONE ENCOUNTER
I have spoke to pt, she is scheduled to see you 2/28/2024, she can't get her ECHO and heart monitor on until 4/10/2024, do you want to push her appt out or do you still want to see how the Lopressor is doing? Please advise.

## 2024-02-19 NOTE — TELEPHONE ENCOUNTER
As long as her vitals are stable, blood pressure normal and heart rate not too low, okay to cancel the 2/28 appointment.  Additionally, her routine appointment is on April 11, just a day after the echo and Holter.  So push that appointment out 2 weeks as well.  Thanks.

## 2024-02-19 NOTE — TELEPHONE ENCOUNTER
Caller: Breanna York    Relationship: Self    Best call back number: [phone]     Who are you requesting to speak with (clinical staff, provider,  specific staff member): BONILLA     What was the call regarding: PATIENT MISSED A CALL FROM BONILLA. ATTEMPTED TO WARM TRANSFER WITH NO SUCCESS. PLEASE CALL PATIENT BACK WHEN AVAILABLE. CALL HER CELL PHONE FIRST, AND IF SHE IS NOT ABLE TO BE REACHED, CALL THE PHONE NUMBER LISTED ABOVE - IT IS HER SONS NUMBER.

## 2024-02-29 ENCOUNTER — LAB (OUTPATIENT)
Dept: LAB | Facility: HOSPITAL | Age: 84
End: 2024-02-29
Payer: MEDICARE

## 2024-02-29 ENCOUNTER — TRANSCRIBE ORDERS (OUTPATIENT)
Dept: LAB | Facility: HOSPITAL | Age: 84
End: 2024-02-29
Payer: MEDICARE

## 2024-02-29 DIAGNOSIS — M81.0 SENILE OSTEOPOROSIS: ICD-10-CM

## 2024-02-29 DIAGNOSIS — M81.0 SENILE OSTEOPOROSIS: Primary | ICD-10-CM

## 2024-02-29 DIAGNOSIS — Z79.899 ENCOUNTER FOR LONG-TERM (CURRENT) USE OF OTHER MEDICATIONS: ICD-10-CM

## 2024-02-29 LAB
25(OH)D3 SERPL-MCNC: 50.7 NG/ML (ref 30–100)
CALCIUM SPEC-SCNC: 9.2 MG/DL (ref 8.6–10.5)
CREAT SERPL-MCNC: 1.24 MG/DL (ref 0.57–1)
EGFRCR SERPLBLD CKD-EPI 2021: 43.3 ML/MIN/1.73

## 2024-02-29 PROCEDURE — 82310 ASSAY OF CALCIUM: CPT

## 2024-02-29 PROCEDURE — 82306 VITAMIN D 25 HYDROXY: CPT

## 2024-02-29 PROCEDURE — 36415 COLL VENOUS BLD VENIPUNCTURE: CPT

## 2024-02-29 PROCEDURE — 82565 ASSAY OF CREATININE: CPT

## 2024-03-05 ENCOUNTER — TRANSCRIBE ORDERS (OUTPATIENT)
Dept: ADMINISTRATIVE | Facility: HOSPITAL | Age: 84
End: 2024-03-05
Payer: MEDICARE

## 2024-03-05 DIAGNOSIS — M81.0 OSTEOPOROSIS, POST-MENOPAUSAL: Primary | ICD-10-CM

## 2024-04-10 ENCOUNTER — HOSPITAL ENCOUNTER (OUTPATIENT)
Dept: CARDIOLOGY | Facility: HOSPITAL | Age: 84
Discharge: HOME | End: 2024-04-10
Payer: MEDICARE

## 2024-04-10 DIAGNOSIS — R00.2 PALPITATIONS: ICD-10-CM

## 2024-04-10 DIAGNOSIS — R01.1 CHANGE IN HEART MURMUR: ICD-10-CM

## 2024-04-10 LAB
BH CV ECHO MEAS - AI P1/2T: 621.4 MSEC
BH CV ECHO MEAS - AO MAX PG: 11.3 MMHG
BH CV ECHO MEAS - AO MEAN PG: 5.8 MMHG
BH CV ECHO MEAS - AO V2 MAX: 168.3 CM/SEC
BH CV ECHO MEAS - AO V2 VTI: 34.8 CM
BH CV ECHO MEAS - AVA(I,D): 2.5 CM2
BH CV ECHO MEAS - EDV(CUBED): 54 ML
BH CV ECHO MEAS - EDV(MOD-SP2): 76.8 ML
BH CV ECHO MEAS - EDV(MOD-SP4): 60.3 ML
BH CV ECHO MEAS - EF(MOD-BP): 62.8 %
BH CV ECHO MEAS - EF(MOD-SP2): 57.6 %
BH CV ECHO MEAS - EF(MOD-SP4): 68.8 %
BH CV ECHO MEAS - ESV(CUBED): 10.3 ML
BH CV ECHO MEAS - ESV(MOD-SP2): 32.6 ML
BH CV ECHO MEAS - ESV(MOD-SP4): 18.8 ML
BH CV ECHO MEAS - FS: 42.5 %
BH CV ECHO MEAS - IVS/LVPW: 0.97 CM
BH CV ECHO MEAS - IVSD: 0.95 CM
BH CV ECHO MEAS - LA DIMENSION: 3.2 CM
BH CV ECHO MEAS - LAT PEAK E' VEL: 5.3 CM/SEC
BH CV ECHO MEAS - LV DIASTOLIC VOL/BSA (35-75): 38.6 CM2
BH CV ECHO MEAS - LV MASS(C)D: 111.6 GRAMS
BH CV ECHO MEAS - LV MAX PG: 8 MMHG
BH CV ECHO MEAS - LV MEAN PG: 3.9 MMHG
BH CV ECHO MEAS - LV SYSTOLIC VOL/BSA (12-30): 12 CM2
BH CV ECHO MEAS - LV V1 MAX: 141.4 CM/SEC
BH CV ECHO MEAS - LV V1 VTI: 28.8 CM
BH CV ECHO MEAS - LVIDD: 3.8 CM
BH CV ECHO MEAS - LVIDS: 2.17 CM
BH CV ECHO MEAS - LVOT AREA: 3.1 CM2
BH CV ECHO MEAS - LVOT DIAM: 1.98 CM
BH CV ECHO MEAS - LVPWD: 0.99 CM
BH CV ECHO MEAS - MED PEAK E' VEL: 5 CM/SEC
BH CV ECHO MEAS - MV A MAX VEL: 89.5 CM/SEC
BH CV ECHO MEAS - MV DEC SLOPE: 207.8 CM/SEC2
BH CV ECHO MEAS - MV DEC TIME: 0.3 SEC
BH CV ECHO MEAS - MV E MAX VEL: 62.6 CM/SEC
BH CV ECHO MEAS - MV E/A: 0.7
BH CV ECHO MEAS - RVDD: 3.1 CM
BH CV ECHO MEAS - SI(MOD-SP2): 28.3 ML/M2
BH CV ECHO MEAS - SI(MOD-SP4): 26.6 ML/M2
BH CV ECHO MEAS - SV(LVOT): 88.3 ML
BH CV ECHO MEAS - SV(MOD-SP2): 44.2 ML
BH CV ECHO MEAS - SV(MOD-SP4): 41.5 ML
BH CV ECHO MEAS - TAPSE (>1.6): 2.08 CM
BH CV ECHO MEAS - TR MAX PG: 21.3 MMHG
BH CV ECHO MEAS - TR MAX VEL: 230.5 CM/SEC
BH CV ECHO MEASUREMENTS AVERAGE E/E' RATIO: 12.16
IVRT: 100 MS
LEFT ATRIUM VOLUME INDEX: 24 ML/M2

## 2024-04-10 PROCEDURE — 93225 XTRNL ECG REC<48 HRS REC: CPT

## 2024-04-10 PROCEDURE — 93306 TTE W/DOPPLER COMPLETE: CPT

## 2024-04-22 ENCOUNTER — LAB (OUTPATIENT)
Dept: LAB | Facility: HOSPITAL | Age: 84
End: 2024-04-22
Payer: MEDICARE

## 2024-04-22 DIAGNOSIS — E78.2 MIXED HYPERLIPIDEMIA: ICD-10-CM

## 2024-04-22 DIAGNOSIS — E53.8 VITAMIN B12 DEFICIENCY: ICD-10-CM

## 2024-04-22 DIAGNOSIS — I10 ESSENTIAL HYPERTENSION: ICD-10-CM

## 2024-04-22 DIAGNOSIS — D62 ACUTE POSTOPERATIVE ANEMIA DUE TO EXPECTED BLOOD LOSS: ICD-10-CM

## 2024-04-22 LAB
ALBUMIN SERPL-MCNC: 4.7 G/DL (ref 3.5–5.2)
ALBUMIN/GLOB SERPL: 1.7 G/DL
ALP SERPL-CCNC: 71 U/L (ref 39–117)
ALT SERPL W P-5'-P-CCNC: 13 U/L (ref 1–33)
ANION GAP SERPL CALCULATED.3IONS-SCNC: 12 MMOL/L (ref 5–15)
AST SERPL-CCNC: 22 U/L (ref 1–32)
BASOPHILS # BLD AUTO: 0.07 10*3/MM3 (ref 0–0.2)
BASOPHILS NFR BLD AUTO: 1.2 % (ref 0–1.5)
BILIRUB SERPL-MCNC: 0.3 MG/DL (ref 0–1.2)
BUN SERPL-MCNC: 13 MG/DL (ref 8–23)
BUN/CREAT SERPL: 15.7 (ref 7–25)
CALCIUM SPEC-SCNC: 9.2 MG/DL (ref 8.6–10.5)
CHLORIDE SERPL-SCNC: 102 MMOL/L (ref 98–107)
CHOLEST SERPL-MCNC: 244 MG/DL (ref 0–200)
CO2 SERPL-SCNC: 26 MMOL/L (ref 22–29)
CREAT SERPL-MCNC: 0.83 MG/DL (ref 0.57–1)
DEPRECATED RDW RBC AUTO: 41 FL (ref 37–54)
EGFRCR SERPLBLD CKD-EPI 2021: 70 ML/MIN/1.73
EOSINOPHIL # BLD AUTO: 0.13 10*3/MM3 (ref 0–0.4)
EOSINOPHIL NFR BLD AUTO: 2.3 % (ref 0.3–6.2)
ERYTHROCYTE [DISTWIDTH] IN BLOOD BY AUTOMATED COUNT: 12.3 % (ref 12.3–15.4)
FERRITIN SERPL-MCNC: 54 NG/ML (ref 13–150)
FOLATE SERPL-MCNC: 16.7 NG/ML (ref 4.78–24.2)
GLOBULIN UR ELPH-MCNC: 2.7 GM/DL
GLUCOSE SERPL-MCNC: 90 MG/DL (ref 65–99)
HCT VFR BLD AUTO: 36.7 % (ref 34–46.6)
HDLC SERPL-MCNC: 80 MG/DL (ref 40–60)
HGB BLD-MCNC: 12.4 G/DL (ref 12–15.9)
IMM GRANULOCYTES # BLD AUTO: 0.02 10*3/MM3 (ref 0–0.05)
IMM GRANULOCYTES NFR BLD AUTO: 0.3 % (ref 0–0.5)
IRON 24H UR-MRATE: 80 MCG/DL (ref 37–145)
IRON SATN MFR SERPL: 19 % (ref 20–50)
LDLC SERPL CALC-MCNC: 150 MG/DL (ref 0–100)
LDLC/HDLC SERPL: 1.84 {RATIO}
LYMPHOCYTES # BLD AUTO: 2.24 10*3/MM3 (ref 0.7–3.1)
LYMPHOCYTES NFR BLD AUTO: 39.2 % (ref 19.6–45.3)
MCH RBC QN AUTO: 30.6 PG (ref 26.6–33)
MCHC RBC AUTO-ENTMCNC: 33.8 G/DL (ref 31.5–35.7)
MCV RBC AUTO: 90.6 FL (ref 79–97)
MONOCYTES # BLD AUTO: 0.5 10*3/MM3 (ref 0.1–0.9)
MONOCYTES NFR BLD AUTO: 8.7 % (ref 5–12)
NEUTROPHILS NFR BLD AUTO: 2.76 10*3/MM3 (ref 1.7–7)
NEUTROPHILS NFR BLD AUTO: 48.3 % (ref 42.7–76)
NRBC BLD AUTO-RTO: 0 /100 WBC (ref 0–0.2)
PLATELET # BLD AUTO: 263 10*3/MM3 (ref 140–450)
PMV BLD AUTO: 10.2 FL (ref 6–12)
POTASSIUM SERPL-SCNC: 4 MMOL/L (ref 3.5–5.2)
PROT SERPL-MCNC: 7.4 G/DL (ref 6–8.5)
RBC # BLD AUTO: 4.05 10*6/MM3 (ref 3.77–5.28)
SODIUM SERPL-SCNC: 140 MMOL/L (ref 136–145)
TIBC SERPL-MCNC: 414 MCG/DL (ref 298–536)
TRANSFERRIN SERPL-MCNC: 278 MG/DL (ref 200–360)
TRIGL SERPL-MCNC: 85 MG/DL (ref 0–150)
VIT B12 BLD-MCNC: 291 PG/ML (ref 211–946)
VLDLC SERPL-MCNC: 14 MG/DL (ref 5–40)
WBC NRBC COR # BLD AUTO: 5.72 10*3/MM3 (ref 3.4–10.8)

## 2024-04-22 PROCEDURE — 82728 ASSAY OF FERRITIN: CPT

## 2024-04-22 PROCEDURE — 80061 LIPID PANEL: CPT

## 2024-04-22 PROCEDURE — 80053 COMPREHEN METABOLIC PANEL: CPT

## 2024-04-22 PROCEDURE — 82746 ASSAY OF FOLIC ACID SERUM: CPT

## 2024-04-22 PROCEDURE — 85025 COMPLETE CBC W/AUTO DIFF WBC: CPT

## 2024-04-22 PROCEDURE — 83540 ASSAY OF IRON: CPT

## 2024-04-22 PROCEDURE — 36415 COLL VENOUS BLD VENIPUNCTURE: CPT

## 2024-04-22 PROCEDURE — 84466 ASSAY OF TRANSFERRIN: CPT

## 2024-04-22 PROCEDURE — 82607 VITAMIN B-12: CPT

## 2024-04-25 ENCOUNTER — OFFICE VISIT (OUTPATIENT)
Dept: INTERNAL MEDICINE | Age: 84
End: 2024-04-25
Payer: MEDICARE

## 2024-04-25 VITALS
WEIGHT: 123.4 LBS | HEIGHT: 63 IN | DIASTOLIC BLOOD PRESSURE: 80 MMHG | OXYGEN SATURATION: 97 % | TEMPERATURE: 97.3 F | BODY MASS INDEX: 21.86 KG/M2 | SYSTOLIC BLOOD PRESSURE: 146 MMHG | HEART RATE: 76 BPM

## 2024-04-25 DIAGNOSIS — D62 ACUTE POSTOPERATIVE ANEMIA DUE TO EXPECTED BLOOD LOSS: ICD-10-CM

## 2024-04-25 DIAGNOSIS — R00.2 PALPITATIONS: ICD-10-CM

## 2024-04-25 DIAGNOSIS — R06.09 DYSPNEA ON EXERTION: ICD-10-CM

## 2024-04-25 DIAGNOSIS — I10 ESSENTIAL HYPERTENSION: ICD-10-CM

## 2024-04-25 DIAGNOSIS — E78.2 MIXED HYPERLIPIDEMIA: ICD-10-CM

## 2024-04-25 DIAGNOSIS — R09.89 ABNORMAL PERIPHERAL PULSE: ICD-10-CM

## 2024-04-25 DIAGNOSIS — E55.9 VITAMIN D DEFICIENCY: ICD-10-CM

## 2024-04-25 DIAGNOSIS — M19.91 PRIMARY LOCALIZED OSTEOARTHRITIS: ICD-10-CM

## 2024-04-25 DIAGNOSIS — I35.0 MILD AORTIC STENOSIS: ICD-10-CM

## 2024-04-25 DIAGNOSIS — Z00.00 MEDICARE ANNUAL WELLNESS VISIT, SUBSEQUENT: Primary | ICD-10-CM

## 2024-04-25 PROBLEM — Z96.651 S/P TOTAL KNEE REPLACEMENT, RIGHT: Status: RESOLVED | Noted: 2023-10-23 | Resolved: 2024-04-25

## 2024-04-25 PROBLEM — Z09 HOSPITAL DISCHARGE FOLLOW-UP: Status: RESOLVED | Noted: 2023-10-23 | Resolved: 2024-04-25

## 2024-04-25 RX ORDER — AMLODIPINE BESYLATE 5 MG/1
5 TABLET ORAL NIGHTLY
Qty: 90 TABLET | Refills: 1 | Status: SHIPPED | OUTPATIENT
Start: 2024-04-25

## 2024-04-25 RX ORDER — GABAPENTIN 100 MG/1
200 CAPSULE ORAL
Qty: 180 CAPSULE | Refills: 1 | Status: SHIPPED | OUTPATIENT
Start: 2024-04-25

## 2024-04-25 RX ORDER — AMLODIPINE BESYLATE 5 MG/1
TABLET ORAL
Qty: 180 TABLET | Refills: 1 | Status: SHIPPED | OUTPATIENT
Start: 2024-04-25 | End: 2024-04-25

## 2024-04-25 RX ORDER — AMLODIPINE BESYLATE 5 MG/1
5 TABLET ORAL DAILY
Qty: 90 TABLET | Refills: 1 | Status: SHIPPED | OUTPATIENT
Start: 2024-04-25 | End: 2024-04-25

## 2024-04-25 RX ORDER — ASPIRIN 81 MG/1
81 TABLET ORAL DAILY
COMMUNITY

## 2024-04-25 NOTE — ASSESSMENT & PLAN NOTE
Noticed a change in heart murmur at her 1/24 OV, so the above echo was requested.  We reviewed it at her 4/24 OV, discussed with patient no signs of MI since EF is well-maintained.  The murmur is related to just mild AS, unless she develops symptoms, will probably repeat this in 2 to 3 years.

## 2024-04-25 NOTE — ASSESSMENT & PLAN NOTE
Patient was on meloxicam, she bumped her creatinine some back in February, so Dr. King recommend that she cut back to half a dose.  Patient decided she wanted to discontinue it altogether.  She is aware she can safely utilize Tylenol.  Additionally we are refilling her chronic gabapentin as of her 4/24 OV.

## 2024-04-25 NOTE — ASSESSMENT & PLAN NOTE
AWV completed 4/24.  Patient remains active and independent.  No falls and no hospitalizations past year.  Patient's advanced directive is already on file at the hospital

## 2024-04-25 NOTE — PROGRESS NOTES
The ABCs of the Annual Wellness Visit  Subsequent Medicare Wellness Visit    Subjective      Breanna York is a 83 y.o. female who presents for a Subsequent Medicare Wellness Visit.    The following portions of the patient's history were reviewed and   updated as appropriate: allergies, current medications, past family history, past medical history, past social history, past surgical history, and problem list.    Compared to one year ago, the patient feels her physical   health is the same.    Compared to one year ago, the patient feels her mental   health is the same.    Recent Hospitalizations:  She was not admitted to the hospital during the last year.       Current Medical Providers:  Patient Care Team:  Topher Perera MD as PCP - General (Internal Medicine)    Outpatient Medications Prior to Visit   Medication Sig Dispense Refill    aspirin 81 MG EC tablet Take 1 tablet by mouth Daily.      Biotin 1000 MCG chewable tablet Chew.      Cholecalciferol 50 MCG (2000 UT) tablet Vitamin D3 50 mcg (2,000 unit) oral tablet take 1 tablet by oral route daily   Active      cyclobenzaprine (FLEXERIL) 10 MG tablet Take 0.5 tablets by mouth 2 (Two) Times a Day As Needed for Muscle Spasms. 30 tablet 0    gabapentin (NEURONTIN) 100 MG capsule TAKE 2 CAPSULES AT BEDTIME 180 capsule 1    Ibuprofen 3 %, Gabapentin 10 %, Baclofen 2 %, lidocaine 4 %, Ketamine HCl 4 % Apply 1-2 g topically to the appropriate area as directed 3 (Three) to 4 (Four) times daily. 90 g 5    metoprolol succinate XL (Toprol XL) 25 MG 24 hr tablet Take 1 tablet by mouth Daily for 180 days. 90 tablet 1    amLODIPine (NORVASC) 5 MG tablet Take 1 tablet by mouth 2 (Two) Times a Day. 180 tablet 1    ferrous sulfate 325 (65 FE) MG tablet Take 1 tablet by mouth Daily With Breakfast.      meloxicam (MOBIC) 15 MG tablet TAKE 1 TABLET BY MOUTH EVERY DAY 30 tablet 1     No facility-administered medications prior to visit.       No opioid medication identified on  "active medication list. I have reviewed chart for other potential  high risk medication/s and harmful drug interactions in the elderly.        Aspirin is not on active medication list.  Aspirin use is not indicated based on review of current medical condition/s. Risk of harm outweighs potential benefits.  .    Patient Active Problem List   Diagnosis    Essential hypertension    Mixed hyperlipidemia    Vitamin D deficiency    Primary localized osteoarthritis    Bruit of right carotid artery    Cervical radiculopathy    Bilateral carpal tunnel syndrome    Postmenopausal osteoporosis    Medicare annual wellness visit, subsequent    Onychomycosis    Acute postoperative anemia due to expected blood loss    Palpitations    Mild aortic stenosis    Abnormal peripheral pulse     Advance Care Planning   Advance Care Planning     Advance Directive is on file.  ACP discussion was held with the patient during this visit. Patient has an advance directive in EMR which is still valid.      Objective    Vitals:    04/25/24 0848   BP: 146/80   Pulse: 76   Temp: 97.3 °F (36.3 °C)   TempSrc: Skin   SpO2: 97%   Weight: 56 kg (123 lb 6.4 oz)   Height: 160 cm (62.99\")     Estimated body mass index is 21.86 kg/m² as calculated from the following:    Height as of this encounter: 160 cm (62.99\").    Weight as of this encounter: 56 kg (123 lb 6.4 oz).    BMI is within normal parameters. No other follow-up for BMI required.      Does the patient have evidence of cognitive impairment?   No    Lab Results   Component Value Date    TRIG 85 04/22/2024    HDL 80 (H) 04/22/2024     (H) 04/22/2024    VLDL 14 04/22/2024          HEALTH RISK ASSESSMENT    Smoking Status:  Social History     Tobacco Use   Smoking Status Never   Smokeless Tobacco Never     Alcohol Consumption:  Social History     Substance and Sexual Activity   Alcohol Use Never     Fall Risk Screen:    STEADI Fall Risk Assessment was completed, and patient is at LOW risk for " falls.Assessment completed on:2024    Depression Screenin/25/2024     8:47 AM   PHQ-2/PHQ-9 Depression Screening   Little Interest or Pleasure in Doing Things 0-->not at all   Feeling Down, Depressed or Hopeless 0-->not at all   PHQ-9: Brief Depression Severity Measure Score 0       Health Habits and Functional and Cognitive Screenin/25/2024     8:00 AM   Functional & Cognitive Status   Do you have difficulty preparing food and eating? No   Do you have difficulty bathing yourself, getting dressed or grooming yourself? No   Do you have difficulty using the toilet? No   Do you have difficulty moving around from place to place? No   Do you have trouble with steps or getting out of a bed or a chair? No   Current Diet Well Balanced Diet   Do you need help using the phone?  No   Are you deaf or do you have serious difficulty hearing?  No   Do you need help to go to places out of walking distance? No   Do you need help shopping? No   Do you need help preparing meals?  No   Do you need help with housework?  No   Do you need help with laundry? No   Do you need help taking your medications? No   Do you need help managing money? No   Do you ever drive or ride in a car without wearing a seat belt? No   Do you have difficulty concentrating, remembering or making decisions? No       Age-appropriate Screening Schedule:  Refer to the list below for future screening recommendations based on patient's age, sex and/or medical conditions. Orders for these recommended tests are listed in the plan section. The patient has been provided with a written plan.    Health Maintenance   Topic Date Due    TDAP/TD VACCINES (1 - Tdap) Never done    RSV Vaccine - Adults (1 - 1-dose 60+ series) Never done    ANNUAL WELLNESS VISIT  2024    INFLUENZA VACCINE  2024    DXA SCAN  10/14/2024    LIPID PANEL  2025    Pneumococcal Vaccine 65+  Completed    ZOSTER VACCINE  Completed    COVID-19 Vaccine  Discontinued                   CMS Preventative Services Quick Reference  Risk Factors Identified During Encounter:    None Identified    The above risks/problems have been discussed with the patient.  Pertinent information has been shared with the patient in the After Visit Summary.    Diagnoses and all orders for this visit:    1. Medicare annual wellness visit, subsequent (Primary)  Assessment & Plan:  AWV completed 4/24.  Patient remains active and independent.  No falls and no hospitalizations past year.  Patient's advanced directive is already on file at the hospital      2. Essential hypertension  Assessment & Plan:  Patient with a reports of drops in her blood pressure when she is standing, she is got systolics in the 90 or lower ballpark at times.  Her systolic did drop about 10 points or so here in the office, but no further.  Checked this several times.    She is on 5 mg twice daily of amlodipine as well as his very low-dose metoprolol.  We went with the twice daily because he was previously having morning elevations when just taking the dose in the morning.  She was previously on 10 mg in the morning so we split it up.  After adding the metoprolol she is noticing some lower blood pressures in the morning, so we will just stick with the nighttime dose 5 mg dose of amlodipine.       3. Mixed hyperlipidemia  Assessment & Plan:  Patient is having some decreased peripheral pulses, so we do have some concern for arteriosclerotic disease in her lower extremities.  Her LDL has been in the 150+ ballpark for many years.  Discussed with patient we will look into getting her the CT calcium score and make further recommendations at that time.    Orders:  -     CT Cardiac Calcium Score Without Dye; Future    4. Palpitations  Assessment & Plan:  Reviewed the Holter at her 4/24 OV.  No concerning arrhythmia noted.  Patient with decreased palpitations, and her heart rate is down from the 100-110 ballpark to the mid 70s presently on  very low-dose metoprolol.  She will continue same, she is having some blood pressure elevations, evening because she backed off on the amlodipine in the morning, so recommend taking metoprolol midday.      5. Mild aortic stenosis  Assessment & Plan:  Noticed a change in heart murmur at her 1/24 OV, so the above echo was requested.  We reviewed it at her 4/24 OV, discussed with patient no signs of MI since EF is well-maintained.  The murmur is related to just mild AS, unless she develops symptoms, will probably repeat this in 2 to 3 years.      6. Abnormal peripheral pulse  Assessment & Plan:  Patient with reports of cold extremity on the left.  She has not had any claudication, she has not had any open sores etc.  It is a little tougher to feel the pulses left posterior tibial and dorsalis pedis.  Will go ahead and get arterial Dopplers and I discussed with her to get on a coated baby aspirin.    Orders:  -     Doppler Arterial Multi Level Lower Extremity - Bilateral CAR; Future    7. Acute postoperative anemia due to expected blood loss  Assessment & Plan:  She has fully recovered from this as of her 4/24 OV, hemoglobin is around 12.5 and her iron is normal off of supplementation.      8. Dyspnea on exertion  -     CT Cardiac Calcium Score Without Dye; Future    Other orders  -     Discontinue: amLODIPine (NORVASC) 5 MG tablet; Take 1/2 tablet in a.m. and whole tablet in p.m.  Dispense: 180 tablet; Refill: 1  -     amLODIPine (NORVASC) 5 MG tablet; Take 1 tablet by mouth Daily. Take 1/2 tablet in a.m. and whole tablet in p.m.  Dispense: 90 tablet; Refill: 1        Follow Up:   Next Medicare Wellness visit to be scheduled in 1 year.      An After Visit Summary and PPPS were made available to the patient.

## 2024-04-25 NOTE — ASSESSMENT & PLAN NOTE
Patient with reports of cold extremity on the left.  She has not had any claudication, she has not had any open sores etc.  It is a little tougher to feel the pulses left posterior tibial and dorsalis pedis.  Will go ahead and get arterial Dopplers and I discussed with her to get on a coated baby aspirin.    (Will need to try off b-blocker if abnl dopplers).

## 2024-04-25 NOTE — ASSESSMENT & PLAN NOTE
She has fully recovered from this as of her 4/24 OV, hemoglobin is around 12.5 and her iron is normal off of supplementation.

## 2024-04-25 NOTE — PROGRESS NOTES
Chief Complaint  Hypertension (She states that her BP is all over the place. She states that if she stands up and takes it it will be lower. ), Follow-up (Pt had labs, she states that this is routine. ), and Left leg is colder at night (Pt states that her left leg is colder then her right  at night. She is wondering if her circulation is bad. She states that it has a cold hurt to  it as well. )    Subjective          Breanna York presents to [facility]     Hypertension  Pertinent negatives include no blurred vision, chest pain, palpitations or shortness of breath.     History of present illness:  Pleasant 82 yo female with HTN, HYPERLIPIDEMIA, DJD, among others, coming in 4/24 for routine 6-month f/u.  We will go over her med list, review her labs in detail, and address any new concerns she may have.    ---> seen 10/23 for Hosp F/U:  Postop nausea vomiting -improved  Primary osteoarthritis right knee status post right total knee arthroplasty -continue pain medicine, DVT prophylaxis  History of hypertension -resume home antihypertensives upon discharge  ... Patient ended up working well with physical therapy today, patient believes she can return home safely.  Patient will require home health services at discharge, patient will follow-up with orthopedic surgery in 2 weeks.     Review of Systems   Constitutional:  Negative for appetite change, fatigue and fever.   HENT:  Negative for congestion and ear pain.    Eyes:  Negative for blurred vision.   Respiratory:  Negative for cough, chest tightness, shortness of breath and wheezing.    Cardiovascular:  Negative for chest pain, palpitations and leg swelling.   Gastrointestinal:  Negative for abdominal pain.   Genitourinary:  Negative for difficulty urinating, dysuria and hematuria.   Musculoskeletal:  Negative for arthralgias and gait problem.   Skin:  Negative for skin lesions.   Neurological:  Negative for syncope, memory problem  "and confusion.   Psychiatric/Behavioral:  Negative for self-injury and depressed mood.        Objective   Vital Signs:   /80   Pulse 76   Temp 97.3 °F (36.3 °C) (Skin)   Ht 160 cm (62.99\")   Wt 56 kg (123 lb 6.4 oz)   SpO2 97%   BMI 21.86 kg/m²           Physical Exam  Vitals and nursing note reviewed.   Constitutional:       General: She is not in acute distress.     Appearance: Normal appearance. She is not toxic-appearing.   HENT:      Head: Atraumatic.      Right Ear: External ear normal.      Left Ear: External ear normal.      Nose: Nose normal.      Mouth/Throat:      Mouth: Mucous membranes are moist.   Eyes:      General:         Right eye: No discharge.         Left eye: No discharge.      Extraocular Movements: Extraocular movements intact.      Pupils: Pupils are equal, round, and reactive to light.   Cardiovascular:      Rate and Rhythm: Normal rate and regular rhythm.      Pulses: Normal pulses.      Heart sounds: Normal heart sounds. No murmur heard.     No gallop.   Pulmonary:      Effort: Pulmonary effort is normal. No respiratory distress.      Breath sounds: No wheezing, rhonchi or rales.   Abdominal:      General: There is no distension.      Palpations: Abdomen is soft. There is no mass.      Tenderness: There is no abdominal tenderness. There is no guarding.   Musculoskeletal:         General: No swelling or tenderness.      Cervical back: No tenderness.      Right lower leg: Edema present.      Left lower leg: No edema.      Comments: Good 2+ edema post-op R TKR.   Skin:     General: Skin is warm and dry.      Findings: No rash.   Neurological:      General: No focal deficit present.      Mental Status: She is alert and oriented to person, place, and time. Mental status is at baseline.      Motor: No weakness.      Gait: Gait normal.   Psychiatric:         Mood and Affect: Mood normal.         Thought Content: Thought content normal.          Result Review :   The following data " was reviewed by: Topher Perera MD on 10/21/2021:                  Assessment and Plan    Diagnoses and all orders for this visit:    1. Medicare annual wellness visit, subsequent (Primary)  Assessment & Plan:  AWV completed 4/24.  Patient remains active and independent.  No falls and no hospitalizations past year.  Patient's advanced directive is already on file at the hospital      2. Essential hypertension  Assessment & Plan:  Patient with a reports of drops in her blood pressure when she is standing, she is got systolics in the 90 or lower ballpark at times.  Her systolic did drop about 10 points or so here in the office, but no further.  Checked this several times.    She is on 5 mg twice daily of amlodipine as well as his very low-dose metoprolol.  We went with the twice daily because he was previously having morning elevations when just taking the dose in the morning.  She was previously on 10 mg in the morning so we split it up.  After adding the metoprolol she is noticing some lower blood pressures in the morning, so we will just stick with the nighttime dose 5 mg dose of amlodipine.     Orders:  -     Comprehensive Metabolic Panel; Future    3. Mixed hyperlipidemia  Assessment & Plan:  Patient is having some decreased peripheral pulses, so we do have some concern for arteriosclerotic disease in her lower extremities.  Her LDL has been in the 150+ ballpark for many years.  Discussed with patient we will look into getting her the CT calcium score and make further recommendations at that time.    Orders:  -     CT Cardiac Calcium Score Without Dye; Future    4. Palpitations  Overview:  Holter 4/24:    Essentially normal 24-hour Holter monitor study.    Underlying rhythm is normal sinus rhythm with an average heart rate of 77 bpm.    Rare isolated PACs and PVCs noted.    There were no arrhythmias.  There were no long pauses.    Patient did not report any symptoms during the study.    Assessment &  Plan:  Reviewed the Holter at her 4/24 OV.  No concerning arrhythmia noted.  Patient with decreased palpitations, and her heart rate is down from the 100-110 ballpark to the mid 70s presently on very low-dose metoprolol.  She will continue same, she is having some blood pressure elevations, evening because she backed off on the amlodipine in the morning, so recommend taking metoprolol midday.      5. Mild aortic stenosis  Overview:  Echo 4/24:  Normal left ventricular systolic function EF 63%  Mild aortic stenosis.  Mild aortic regurgitation.    Assessment & Plan:  Noticed a change in heart murmur at her 1/24 OV, so the above echo was requested.  We reviewed it at her 4/24 OV, discussed with patient no signs of MI since EF is well-maintained.  The murmur is related to just mild AS, unless she develops symptoms, will probably repeat this in 2 to 3 years.      6. Abnormal peripheral pulse  Assessment & Plan:  Patient with reports of cold extremity on the left.  She has not had any claudication, she has not had any open sores etc.  It is a little tougher to feel the pulses left posterior tibial and dorsalis pedis.  Will go ahead and get arterial Dopplers and I discussed with her to get on a coated baby aspirin.    (Will need to try off b-blocker if abnl dopplers).    Orders:  -     Doppler Arterial Multi Level Lower Extremity - Bilateral CAR; Future    7. Acute postoperative anemia due to expected blood loss  Assessment & Plan:  She has fully recovered from this as of her 4/24 OV, hemoglobin is around 12.5 and her iron is normal off of supplementation.      8. Dyspnea on exertion  Assessment & Plan:  Patient with no chest pain or pressure, but she does get winded at times with activities.  This may be appropriate for her age/conditioning, but she is certainly at risk for underlying CAD.  She has underlying hypertension and longstanding untreated hyperlipidemia.  Discussed with patient obtaining noninvasive calcium  score to help guide recommendations going forward.    Orders:  -     CT Cardiac Calcium Score Without Dye; Future    9. Vitamin D deficiency  Assessment & Plan:  Vitamin D was 50 in 2/24, she is followed by Dr. King this regards.      10. Primary localized osteoarthritis  Assessment & Plan:  Patient was on meloxicam, she bumped her creatinine some back in February, so Dr. King recommend that she cut back to half a dose.  Patient decided she wanted to discontinue it altogether.  She is aware she can safely utilize Tylenol.  Additionally we are refilling her chronic gabapentin as of her 4/24 OV.    Orders:  -     gabapentin (NEURONTIN) 100 MG capsule; Take 2 capsules by mouth every night at bedtime.  Dispense: 180 capsule; Refill: 1    Other orders  -     Discontinue: amLODIPine (NORVASC) 5 MG tablet; Take 1/2 tablet in a.m. and whole tablet in p.m.  Dispense: 180 tablet; Refill: 1  -     Discontinue: amLODIPine (NORVASC) 5 MG tablet; Take 1 tablet by mouth Daily. Take 1/2 tablet in a.m. and whole tablet in p.m.  Dispense: 90 tablet; Refill: 1  -     amLODIPine (NORVASC) 5 MG tablet; Take 1 tablet by mouth Every Night.  Dispense: 90 tablet; Refill: 1        BMI is within normal parameters. No other follow-up for BMI required.     Total Time Spent: 42 minutes     This time includes time spent by me in the following activities: preparing for the visit, reviewing extensive past medical history and tests, performing a medically appropriate examination and/or evaluation, counseling and educating the patient and/or caregivers, ordering medications, tests, or procedures, referring and/or communicating with other health care professionals and documenting information in the medical record all on this date of service.      --  --  OLDER NOTES:  URGENT VISIT 10/20:  L NECK PAIN just like 12/16 note below; I d/w C-spine series and take aleve 1 tab bid---> signif DJD as expected; did not have to stay on aleve; no radiation;    TACHYCARDIA = HR up 70 to 90+; has CBC/TSH [facility], so will review them when done and eval further on RTO; no CP...TSH neg, CBC with Hgb 11.2; since resolved; was related to Covid infection she said.  COVID-19 + on Labor Day.  --  VISIT 4/21 = above/below.  ANNUAL MEDICARE WELLNESS 4/21 = d/w all forms in office; no new discoveries; Narciso neg.  --  MILD ANEMIA as of 10/20 OV=11.2, down 1 gram past year; no bleeding; f/u on RTO---> Hgb 12 with nl iron as of 4/21, so defer f/u.  --  HTN remains controlled off ACEI as of 7/18 OV; bring machine in=150's at home (stopped ACEI due to severe angiodedema following bug bites); will use norvasc 2.5 if BP trends back up...start now 1/19...better 4/19---> holding 10/19 on these=ditto 4/21.  ?CKD3 = 53% in 4/20 = watch on RTO---> fine 10/20 and same 4/21.  --  LIPIDS at goal '12 w/o tx...ditto 8/15 with HDL 86..., but no tx unless event...141...121 with HDL 95, so defer tx 10/19---> , so I d/w get it back in the 120's please.  --  C/O R HIP PAIN...exam with excellent ROM, neg SLR, point tenderness c/w bursitis...try meds for now and call...better and then returned, no trauma, intermittent, worse up stairs, but able to mow/trim 5 acres, PTA for eval/HEP...worse after PT, s/p injection per ortho in [place] and prn mobic... HIP now, saw ortho in [place], story sounds radicular, but exam more c/w bursitis, so try medrol and keep using heat; also told me ortho was going to [facility] MRI and I rec gabapentin again...MRI hip=bursitis and spine with DJD; things are better and only tolerating low dose gabapentin...stable off=ran out and no worse.  R KNEE PAIN=prior scopes; sx's flaring 1/18, but not severe yet, so no new tx rec...get steroid shots per Dr Vo prn---> L knee issues as of 4/21 and is in PT.  S/P R r TSR on 4/17/19 per Dr Vo...has completed rehab as of 10/19 OV; no pain meds needed.  --  SHINGLES rash is drying up, no secondary infection, but is very  sensitive; c/w meds and will push dose...she's weaning dose, but still with sxs 6/17.  L NECK PAIN needing to take ASA daily now; ? CINDY vs carotid calcification, no bruit; needs CT and ? dopplers/etc; please call...it was neg.  BALANCE ISSUES past few months, just has to occ grab wall=needs HCT as well...it was neg  --  HYPERCALCEMIA S/P RESECTION 9/15... PTH 40...PTH 73 and is trending up, but Ca++ 8.8 is stable 1/18...PTH 48...40 in 10/19 and will stop checking.    OSTEOPOROSIS...BMD 1/18...spine -1.0, hip -2.4...intol to actonel so on Reclast #5/5 due 1/19---> BMD 10/20 = spine -1.2, hip -2.3 = stable.  VIT D DEF stable (on 2K qd)...55 in 1/19---> 58 in 10/20.  --  GERD/ABD PAIN...to ER 11/13 with CT=mild prominence of CBD and pancreatic duct, labs neg...no melena, no blood...sx's better with 2x PPI...rare sxs 1/18 on no meds, so call if recurrent and would use H2 first line...dysphagia as of 1/19 OV, to water at times, so needs MBSS to start...non-issue.  --  DEPRESSION/INSOMNIA with 's passing...try trazodone first, SSRI if no benefit or intol...with prn ambien...better with PRN restoril.   --  PETICHIAL LESIONS 8/19 on legs=she showed me pictures at 10/19 OV; said it was non-blanchable, no itch, + stinging though; lasted 3-4 days, started in one spot on LLE and spread; I d/w call when recurs and will get labs.  FACIAL SWELLING 4/18 =nasal and bilateral inner eyelids/etc; top of scalp with scabbed area, but no tick/etc; associated erythema; lungs clear, no dysphagia; will treat with abx as well as prednisone and take benadryl as well... recurred 2 weeks later, tried ice/benaryl, to ER, given keflex/bactrim/steroid shot; no dysphagia nor breathing issues with either episode; did have mosquito bite; pics reviewed; might as well finish out abx; rec no ACEI and zyrtec 10 mg bid for now; wear bug sprays if going out.  --  --  MMG 11/22/19 per me and is pending as of 4/21 OV.  EGD 7/12...erosive  gastritis.  COLON 7/12...normal...defer.   REFUSES flu/pneumo; Shingrix x2;  ( '12...after 57 yrs, 4 boys with 1 here and 2 in [place], 1 in ---> had 47 for holidays q year for both holidays=has 22 great-grand already).    Follow Up   Return in about 6 months (around 10/25/2024).  Patient was given instructions and counseling regarding her condition or for health maintenance advice. Please see specific information pulled into the AVS if appropriate.

## 2024-04-25 NOTE — ASSESSMENT & PLAN NOTE
Patient with no chest pain or pressure, but she does get winded at times with activities.  This may be appropriate for her age/conditioning, but she is certainly at risk for underlying CAD.  She has underlying hypertension and longstanding untreated hyperlipidemia.  Discussed with patient obtaining noninvasive calcium score to help guide recommendations going forward.

## 2024-04-25 NOTE — ASSESSMENT & PLAN NOTE
Patient with a reports of drops in her blood pressure when she is standing, she is got systolics in the 90 or lower ballpark at times.  Her systolic did drop about 10 points or so here in the office, but no further.  Checked this several times.    She is on 5 mg twice daily of amlodipine as well as his very low-dose metoprolol.  We went with the twice daily because he was previously having morning elevations when just taking the dose in the morning.  She was previously on 10 mg in the morning so we split it up.  After adding the metoprolol she is noticing some lower blood pressures in the morning, so we will just stick with the nighttime dose 5 mg dose of amlodipine.

## 2024-04-25 NOTE — ASSESSMENT & PLAN NOTE
Patient is having some decreased peripheral pulses, so we do have some concern for arteriosclerotic disease in her lower extremities.  Her LDL has been in the 150+ ballpark for many years.  Discussed with patient we will look into getting her the CT calcium score and make further recommendations at that time.

## 2024-04-25 NOTE — ASSESSMENT & PLAN NOTE
Reviewed the Holter at her 4/24 OV.  No concerning arrhythmia noted.  Patient with decreased palpitations, and her heart rate is down from the 100-110 ballpark to the mid 70s presently on very low-dose metoprolol.  She will continue same, she is having some blood pressure elevations, evening because she backed off on the amlodipine in the morning, so recommend taking metoprolol midday.

## 2024-05-20 ENCOUNTER — HOSPITAL ENCOUNTER (OUTPATIENT)
Dept: CARDIOLOGY | Facility: HOSPITAL | Age: 84
Discharge: HOME | End: 2024-05-20
Admitting: INTERNAL MEDICINE
Payer: MEDICARE

## 2024-05-20 ENCOUNTER — HOSPITAL ENCOUNTER (OUTPATIENT)
Dept: CT IMAGING | Facility: HOSPITAL | Age: 84
Discharge: HOME | End: 2024-05-20
Admitting: INTERNAL MEDICINE

## 2024-05-20 DIAGNOSIS — E78.2 MIXED HYPERLIPIDEMIA: ICD-10-CM

## 2024-05-20 DIAGNOSIS — R09.89 ABNORMAL PERIPHERAL PULSE: ICD-10-CM

## 2024-05-20 DIAGNOSIS — R06.09 DYSPNEA ON EXERTION: ICD-10-CM

## 2024-05-20 LAB
BH CV LOWER ARTERIAL LEFT ABI RATIO: 1.07
BH CV LOWER ARTERIAL LEFT DORSALIS PEDIS SYS MAX: 154
BH CV LOWER ARTERIAL LEFT GREAT TOE SYS MAX: 87
BH CV LOWER ARTERIAL LEFT LOW THIGH SYS MAX: 153
BH CV LOWER ARTERIAL LEFT POPLITEAL SYS MAX: 156
BH CV LOWER ARTERIAL LEFT POST TIBIAL SYS MAX: 160
BH CV LOWER ARTERIAL LEFT TBI RATIO: 0.58
BH CV LOWER ARTERIAL RIGHT ABI RATIO: 1.01
BH CV LOWER ARTERIAL RIGHT DORSALIS PEDIS SYS MAX: 149
BH CV LOWER ARTERIAL RIGHT GREAT TOE SYS MAX: 127
BH CV LOWER ARTERIAL RIGHT LOW THIGH SYS MAX: 156
BH CV LOWER ARTERIAL RIGHT POPLITEAL SYS MAX: 160
BH CV LOWER ARTERIAL RIGHT POST TIBIAL SYS MAX: 151
BH CV LOWER ARTERIAL RIGHT TBI RATIO: 0.85
UPPER ARTERIAL LEFT ARM BRACHIAL SYS MAX: 150
UPPER ARTERIAL RIGHT ARM BRACHIAL SYS MAX: 150

## 2024-05-20 PROCEDURE — 93923 UPR/LXTR ART STDY 3+ LVLS: CPT

## 2024-05-20 PROCEDURE — 75571 CT HRT W/O DYE W/CA TEST: CPT

## 2024-06-13 ENCOUNTER — TRANSCRIBE ORDERS (OUTPATIENT)
Dept: ADMINISTRATIVE | Facility: HOSPITAL | Age: 84
End: 2024-06-13
Payer: MEDICARE

## 2024-06-13 DIAGNOSIS — M81.0 SENILE OSTEOPOROSIS: Primary | ICD-10-CM

## 2024-06-19 DIAGNOSIS — M81.0 POSTMENOPAUSAL OSTEOPOROSIS: Primary | ICD-10-CM

## 2024-06-19 RX ORDER — ZOLEDRONIC ACID 5 MG/100ML
5 INJECTION, SOLUTION INTRAVENOUS ONCE
OUTPATIENT
Start: 2024-06-19

## 2024-07-02 ENCOUNTER — HOSPITAL ENCOUNTER (OUTPATIENT)
Dept: INFUSION THERAPY | Facility: HOSPITAL | Age: 84
Discharge: HOME | End: 2024-07-02
Admitting: INTERNAL MEDICINE
Payer: MEDICARE

## 2024-07-02 VITALS
OXYGEN SATURATION: 97 % | BODY MASS INDEX: 23.41 KG/M2 | HEART RATE: 73 BPM | SYSTOLIC BLOOD PRESSURE: 108 MMHG | TEMPERATURE: 98.4 F | DIASTOLIC BLOOD PRESSURE: 53 MMHG | WEIGHT: 127.21 LBS | HEIGHT: 62 IN

## 2024-07-02 DIAGNOSIS — M81.0 POSTMENOPAUSAL OSTEOPOROSIS: Primary | ICD-10-CM

## 2024-07-02 LAB
CALCIUM SPEC-SCNC: 8.7 MG/DL (ref 8.6–10.5)
CREAT SERPL-MCNC: 0.91 MG/DL (ref 0.57–1)
EGFRCR SERPLBLD CKD-EPI 2021: 62.7 ML/MIN/1.73

## 2024-07-02 PROCEDURE — 96365 THER/PROPH/DIAG IV INF INIT: CPT

## 2024-07-02 PROCEDURE — 25010000002 ZOLEDRONIC ACID 5 MG/100ML SOLUTION: Performed by: INTERNAL MEDICINE

## 2024-07-02 PROCEDURE — 82310 ASSAY OF CALCIUM: CPT | Performed by: INTERNAL MEDICINE

## 2024-07-02 PROCEDURE — 82565 ASSAY OF CREATININE: CPT | Performed by: INTERNAL MEDICINE

## 2024-07-02 PROCEDURE — 96374 THER/PROPH/DIAG INJ IV PUSH: CPT

## 2024-07-02 PROCEDURE — 36415 COLL VENOUS BLD VENIPUNCTURE: CPT

## 2024-07-02 RX ORDER — ZOLEDRONIC ACID 5 MG/100ML
5 INJECTION, SOLUTION INTRAVENOUS ONCE
Status: COMPLETED | OUTPATIENT
Start: 2024-07-02 | End: 2024-07-02

## 2024-07-02 RX ORDER — ZOLEDRONIC ACID 5 MG/100ML
5 INJECTION, SOLUTION INTRAVENOUS ONCE
Status: CANCELLED | OUTPATIENT
Start: 2024-07-02

## 2024-07-02 RX ADMIN — ZOLEDRONIC ACID 5 MG: 0.05 INJECTION, SOLUTION INTRAVENOUS at 15:45

## 2024-07-18 ENCOUNTER — OFFICE VISIT (OUTPATIENT)
Dept: ORTHOPEDIC SURGERY | Facility: CLINIC | Age: 84
End: 2024-07-18
Payer: MEDICARE

## 2024-07-18 VITALS
HEART RATE: 68 BPM | DIASTOLIC BLOOD PRESSURE: 81 MMHG | HEIGHT: 62 IN | OXYGEN SATURATION: 95 % | WEIGHT: 127 LBS | SYSTOLIC BLOOD PRESSURE: 129 MMHG | BODY MASS INDEX: 23.37 KG/M2

## 2024-07-18 DIAGNOSIS — Z47.1 AFTERCARE FOLLOWING RIGHT KNEE JOINT REPLACEMENT SURGERY: Primary | ICD-10-CM

## 2024-07-18 DIAGNOSIS — Z96.651 AFTERCARE FOLLOWING RIGHT KNEE JOINT REPLACEMENT SURGERY: Primary | ICD-10-CM

## 2024-07-18 PROCEDURE — 99213 OFFICE O/P EST LOW 20 MIN: CPT | Performed by: ORTHOPAEDIC SURGERY

## 2024-07-18 PROCEDURE — 3074F SYST BP LT 130 MM HG: CPT | Performed by: ORTHOPAEDIC SURGERY

## 2024-07-18 PROCEDURE — 3079F DIAST BP 80-89 MM HG: CPT | Performed by: ORTHOPAEDIC SURGERY

## 2024-07-19 NOTE — PROGRESS NOTES
"Chief Complaint  Follow-up and Pain of the Right Knee     Subjective      Breanna York presents to [facility] for follow up of the right knee. She had a right total knee arthroplasty on 10/16/23. She had physical therapy and notes she is doing well.     Allergies   Allergen Reactions    Ace Inhibitors Angioedema     This occurred after some insect bites, but still need to defer this class of medications going forward    Meperidine Hallucinations    Latex Rash        Social History     Socioeconomic History    Marital status:    Tobacco Use    Smoking status: Never    Smokeless tobacco: Never   Vaping Use    Vaping status: Never Used   Substance and Sexual Activity    Alcohol use: Never    Drug use: Never    Sexual activity: Not Currently     Comment:         I reviewed the patient's chief complaint, history of present illness, review of systems, past medical history, surgical history, family history, social history, medications, and allergy list.     Review of Systems     Constitutional: Denies fevers, chills, weight loss  Cardiovascular: Denies chest pain, shortness of breath  Skin: Denies rashes, acute skin changes  Neurologic: Denies headache, loss of consciousness      Vital Signs:   /81   Pulse 68   Ht 157.5 cm (62\")   Wt 57.6 kg (127 lb)   SpO2 95%   BMI 23.23 kg/m²          Physical Exam  General: Alert. No acute distress    Ortho Exam        RIGHT KNEE Flexion Well healed incision. 120. Extension 0. Stable to varus/valgus stress. Stable to anterior/posterior drawer. Neurovascularly intact. Positive EHL, FHL, HS and TA. Sensation intact to light touch all 5 nerves of the foot. Ambulates with Antalgic gait. Patella is well tracking. Calf supple, non-tender.  Good strength to hamstrings, quadriceps, dorsiflexors, and plantar flexors.  Knee Extensor Mechanism intact       Procedures      Imaging Results (Most Recent)       None             Result Review : "          Assessment and Plan     Diagnoses and all orders for this visit:    1. Aftercare following right knee joint replacement surgery (Primary)        Discussed the treatment plan with the patient.     Modify activity as needed.     Will obtain X-Rays of the right knee at next visit.    Call or return if worsening symptoms.    Follow Up     1 year for repeat films.       Patient was given instructions and counseling regarding her condition or for health maintenance advice. Please see specific information pulled into the AVS if appropriate.     Transcribed for Candice Vo MD by Elidia Pineda MA.  07/18/24   23:08 EDT      I have personally performed the services described in this document as scribed by the above individual and it is both accurate and complete. Candice Vo MD 07/22/24

## 2024-07-26 RX ORDER — METOPROLOL SUCCINATE 25 MG/1
25 TABLET, EXTENDED RELEASE ORAL DAILY
Qty: 90 TABLET | Refills: 1 | Status: SHIPPED | OUTPATIENT
Start: 2024-07-26

## 2024-10-16 ENCOUNTER — HOSPITAL ENCOUNTER (OUTPATIENT)
Dept: BONE DENSITY | Facility: HOSPITAL | Age: 84
Discharge: HOME | End: 2024-10-16
Admitting: INTERNAL MEDICINE
Payer: MEDICARE

## 2024-10-16 DIAGNOSIS — M81.0 OSTEOPOROSIS, POST-MENOPAUSAL: ICD-10-CM

## 2024-10-16 PROCEDURE — 77080 DXA BONE DENSITY AXIAL: CPT

## 2024-10-21 ENCOUNTER — LAB (OUTPATIENT)
Dept: LAB | Facility: HOSPITAL | Age: 84
End: 2024-10-21
Payer: MEDICARE

## 2024-10-21 DIAGNOSIS — I10 ESSENTIAL HYPERTENSION: ICD-10-CM

## 2024-10-21 DIAGNOSIS — R53.83 OTHER FATIGUE: ICD-10-CM

## 2024-10-21 LAB
ALBUMIN SERPL-MCNC: 4.2 G/DL (ref 3.5–5.2)
ALBUMIN/GLOB SERPL: 1.3 G/DL
ALP SERPL-CCNC: 66 U/L (ref 39–117)
ALT SERPL W P-5'-P-CCNC: 12 U/L (ref 1–33)
ANION GAP SERPL CALCULATED.3IONS-SCNC: 8.7 MMOL/L (ref 5–15)
AST SERPL-CCNC: 25 U/L (ref 1–32)
BILIRUB SERPL-MCNC: 0.3 MG/DL (ref 0–1.2)
BUN SERPL-MCNC: 15 MG/DL (ref 8–23)
BUN/CREAT SERPL: 16.9 (ref 7–25)
CALCIUM SPEC-SCNC: 9.2 MG/DL (ref 8.6–10.5)
CHLORIDE SERPL-SCNC: 104 MMOL/L (ref 98–107)
CO2 SERPL-SCNC: 26.3 MMOL/L (ref 22–29)
CREAT SERPL-MCNC: 0.89 MG/DL (ref 0.57–1)
EGFRCR SERPLBLD CKD-EPI 2021: 64 ML/MIN/1.73
GLOBULIN UR ELPH-MCNC: 3.2 GM/DL
GLUCOSE SERPL-MCNC: 89 MG/DL (ref 65–99)
POTASSIUM SERPL-SCNC: 3.8 MMOL/L (ref 3.5–5.2)
PROT SERPL-MCNC: 7.4 G/DL (ref 6–8.5)
SODIUM SERPL-SCNC: 139 MMOL/L (ref 136–145)

## 2024-10-21 PROCEDURE — 36415 COLL VENOUS BLD VENIPUNCTURE: CPT

## 2024-10-21 PROCEDURE — 84439 ASSAY OF FREE THYROXINE: CPT

## 2024-10-21 PROCEDURE — 84443 ASSAY THYROID STIM HORMONE: CPT

## 2024-10-21 PROCEDURE — 80053 COMPREHEN METABOLIC PANEL: CPT

## 2024-10-24 ENCOUNTER — OFFICE VISIT (OUTPATIENT)
Dept: INTERNAL MEDICINE | Age: 84
End: 2024-10-24
Payer: MEDICARE

## 2024-10-24 VITALS
BODY MASS INDEX: 23.45 KG/M2 | SYSTOLIC BLOOD PRESSURE: 132 MMHG | HEIGHT: 62 IN | OXYGEN SATURATION: 98 % | WEIGHT: 127.4 LBS | HEART RATE: 74 BPM | DIASTOLIC BLOOD PRESSURE: 70 MMHG | TEMPERATURE: 97.5 F

## 2024-10-24 DIAGNOSIS — E55.9 VITAMIN D DEFICIENCY: ICD-10-CM

## 2024-10-24 DIAGNOSIS — Z23 NEED FOR VACCINATION: ICD-10-CM

## 2024-10-24 DIAGNOSIS — I10 ESSENTIAL HYPERTENSION: Primary | ICD-10-CM

## 2024-10-24 DIAGNOSIS — R06.09 DYSPNEA ON EXERTION: ICD-10-CM

## 2024-10-24 DIAGNOSIS — E78.2 MIXED HYPERLIPIDEMIA: ICD-10-CM

## 2024-10-24 DIAGNOSIS — R53.83 OTHER FATIGUE: ICD-10-CM

## 2024-10-24 DIAGNOSIS — M81.0 POSTMENOPAUSAL OSTEOPOROSIS: ICD-10-CM

## 2024-10-24 DIAGNOSIS — R00.2 PALPITATIONS: ICD-10-CM

## 2024-10-24 DIAGNOSIS — M19.91 PRIMARY LOCALIZED OSTEOARTHRITIS: ICD-10-CM

## 2024-10-24 LAB
T4 FREE SERPL-MCNC: 1.1 NG/DL (ref 0.92–1.68)
TSH SERPL DL<=0.05 MIU/L-ACNC: 2.43 UIU/ML (ref 0.27–4.2)

## 2024-10-24 RX ORDER — GABAPENTIN 100 MG/1
200 CAPSULE ORAL
Qty: 180 CAPSULE | Refills: 1 | Status: SHIPPED | OUTPATIENT
Start: 2024-10-24

## 2024-10-24 NOTE — ASSESSMENT & PLAN NOTE
Blood pressure stable and her pulse is right around 70 as of her 10/24 OV.  She is on low-dose metoprolol and moderate dose amlodipine, stable to continue same.

## 2024-10-24 NOTE — ASSESSMENT & PLAN NOTE
Checking this on return to office just for reasons, she has not desired treatment with statin in the past, and her CT calcium score just came back at 3, so certainly no big indication there.

## 2024-10-24 NOTE — ASSESSMENT & PLAN NOTE
CT calcium score noted as above, very reassuring, do not recommend stress imaging at this time unless patient develops chest pain or pressure.

## 2024-10-24 NOTE — ASSESSMENT & PLAN NOTE
This is more of an issue as of her 10/24 OV.  Her renal function is very stable, sugar is normal.  She was not anemic earlier this year.  We are adding thyroid function to her blood in the lab.  She had a very normal CT calcium score back in the spring.    Patient is still able to do her work outside, mows her yard etc., not having any ischemic chest pain, but getting tired and having to rest in between activities.    D/W pt I feel this is very appropriate.

## 2024-10-24 NOTE — PROGRESS NOTES
Chief Complaint  Hypertension, Follow-up (Pt had labs, she states that this is routine.), and Fatigue (Pt states that she gets tired more easily. She states that her legs feel like jello when she is tired. )    Subjective          Breanna York presents to [facility]     Hypertension  Pertinent negatives include no blurred vision, chest pain, palpitations or shortness of breath.   Follow-upAssociated symptoms include: fatigue. Pertinent negatives include no chest pain, no confusion, no palpitations, no shortness of breath, no blurred vision, no wheezing, no abdominal pain, no cough and no depressed mood.   Fatigue  Associated symptoms include fatigue. Pertinent negatives include no abdominal pain, arthralgias, chest pain, congestion, coughing or fever.     History of present illness:  Pleasant 83 yo female with HTN, HYPERLIPIDEMIA, DJD, among others, coming in 10/24 for routine 6-month f/u.  We will go over her med list, review her labs in detail, and address any new concerns she may have.    ---> seen 10/23 for Hosp F/U:  Postop nausea vomiting -improved  Primary osteoarthritis right knee status post right total knee arthroplasty -continue pain medicine, DVT prophylaxis  History of hypertension -resume home antihypertensives upon discharge  ... Patient ended up working well with physical therapy today, patient believes she can return home safely.  Patient will require home health services at discharge, patient will follow-up with orthopedic surgery in 2 weeks.     Review of Systems   Constitutional:  Positive for fatigue. Negative for appetite change and fever.   HENT:  Negative for congestion and ear pain.    Eyes:  Negative for blurred vision.   Respiratory:  Negative for cough, chest tightness, shortness of breath and wheezing.    Cardiovascular:  Negative for chest pain, palpitations and leg swelling.   Gastrointestinal:  Negative for abdominal pain.   Genitourinary:   "Negative for difficulty urinating, dysuria and hematuria.   Musculoskeletal:  Negative for arthralgias and gait problem.   Skin:  Negative for skin lesions.   Neurological:  Negative for syncope, memory problem and confusion.   Psychiatric/Behavioral:  Negative for self-injury and depressed mood.        Objective   Vital Signs:   /70   Pulse 74   Temp 97.5 °F (36.4 °C) (Skin)   Ht 157.5 cm (62.01\")   Wt 57.8 kg (127 lb 6.4 oz)   SpO2 98%   BMI 23.30 kg/m²           Physical Exam  Vitals and nursing note reviewed.   Constitutional:       General: She is not in acute distress.     Appearance: Normal appearance. She is not toxic-appearing.   HENT:      Head: Atraumatic.      Right Ear: External ear normal.      Left Ear: External ear normal.      Nose: Nose normal.      Mouth/Throat:      Mouth: Mucous membranes are moist.   Eyes:      General:         Right eye: No discharge.         Left eye: No discharge.      Extraocular Movements: Extraocular movements intact.      Pupils: Pupils are equal, round, and reactive to light.   Cardiovascular:      Rate and Rhythm: Normal rate and regular rhythm.      Pulses: Normal pulses.      Heart sounds: Normal heart sounds. No murmur heard.     No gallop.   Pulmonary:      Effort: Pulmonary effort is normal. No respiratory distress.      Breath sounds: No wheezing, rhonchi or rales.   Abdominal:      General: There is no distension.      Palpations: Abdomen is soft. There is no mass.      Tenderness: There is no abdominal tenderness. There is no guarding.   Musculoskeletal:         General: No swelling or tenderness.      Cervical back: No tenderness.      Right lower leg: Edema present.      Left lower leg: No edema.      Comments: Good 2+ edema post-op R TKR.   Skin:     General: Skin is warm and dry.      Findings: No rash.   Neurological:      General: No focal deficit present.      Mental Status: She is alert and oriented to person, place, and time. Mental status " is at baseline.      Motor: No weakness.      Gait: Gait normal.   Psychiatric:         Mood and Affect: Mood normal.         Thought Content: Thought content normal.          Result Review :   The following data was reviewed by: Topher Perera MD on 10/21/2021:                  Assessment and Plan    Diagnoses and all orders for this visit:    1. Essential hypertension (Primary)  Assessment & Plan:  Blood pressure stable and her pulse is right around 70 as of her 10/24 OV.  She is on low-dose metoprolol and moderate dose amlodipine, stable to continue same.    Orders:  -     Comprehensive Metabolic Panel; Future    2. Primary localized osteoarthritis  -     gabapentin (NEURONTIN) 100 MG capsule; Take 2 capsules by mouth every night at bedtime.  Dispense: 180 capsule; Refill: 1    3. Mixed hyperlipidemia  Assessment & Plan:  Checking this on return to office just for reasons, she has not desired treatment with statin in the past, and her CT calcium score just came back at 3, so certainly no big indication there.    Orders:  -     Lipid Panel; Future    4. Palpitations  Overview:  Holter 4/24:    Essentially normal 24-hour Holter monitor study.    Underlying rhythm is normal sinus rhythm with an average heart rate of 77 bpm.    Rare isolated PACs and PVCs noted.    There were no arrhythmias.  There were no long pauses.    Patient did not report any symptoms during the study.    Assessment & Plan:  She still has occasional palpitations but certainly less than prior to getting her on low-dose metoprolol, pulse as noted is around 70, stable to continue same.      5. Other fatigue  Assessment & Plan:  This is more of an issue as of her 10/24 OV.  Her renal function is very stable, sugar is normal.  She was not anemic earlier this year.  We are adding thyroid function to her blood in the lab.  She had a very normal CT calcium score back in the spring.    Patient is still able to do her work outside, mows her yard etc., not  having any ischemic chest pain, but getting tired and having to rest in between activities.    D/W pt I feel this is very appropriate.    Orders:  -     TSH; Future  -     T4, free; Future  -     Cortisol; Future  -     CBC & Differential; Future  -     Vitamin B12 anemia; Future  -     Folate anemia; Future    6. Vitamin D deficiency  -     Vitamin D,25-Hydroxy; Future    7. Dyspnea on exertion  Overview:  CT calcium score 5/24:  Agatston scores for individual coronary arteries are as follows:  Left main (LM): 0  Left anterior descending (LAD): 2.7  Left circumflex (CX): 0  Right coronary artery (RCA): 0  Total 2.7    Assessment & Plan:  CT calcium score noted as above, very reassuring, do not recommend stress imaging at this time unless patient develops chest pain or pressure.      8. Postmenopausal osteoporosis  Overview:  BMD 10/24.  Spine -0.7  Hip -2.6    BMD 10/22:  Spine -2.8  Hip -2.5    BMD 1/18...spine -1.0, hip -2.4  BMD 10/20 = spine -1.2, hip -2.3    Assessment & Plan:  Discussed with patient the bone density is fairly stable, best that could be expected as of her 10/24 OV.  We are continuing with Reclast therapy, patient remains very active, her vitamin D level was 51 earlier this year on low-dose over-the-counter supplementation, and she is aware to get calcium in her diet or supplement with this as well.  Continue current plan of care, she had her last Reclast infusion in 7/24.        Other orders  -     Fluzone High-Dose 65+yrs ([id number])        BMI is within normal parameters. No other follow-up for BMI required.     Total Time Spent: 42 minutes     This time includes time spent by me in the following activities: preparing for the visit, reviewing extensive past medical history and tests, performing a medically appropriate examination and/or evaluation, counseling and educating the patient and/or caregivers, ordering medications, tests, or procedures, referring and/or communicating with other  health care professionals and documenting information in the medical record all on this date of service.      --  --  OLDER NOTES:  URGENT VISIT 10/20:  L NECK PAIN just like 12/16 note below; I d/w C-spine series and take aleve 1 tab bid---> signif DJD as expected; did not have to stay on aleve; no radiation;   TACHYCARDIA = HR up 70 to 90+; has CBC/TSH conrad, so will review them when done and eval further on RTO; no CP...TSH neg, CBC with Hgb 11.2; since resolved; was related to Covid infection she said.  COVID-19 + on Labor Day.  --  VISIT 4/21 = above/below.  ANNUAL MEDICARE WELLNESS 4/21 = d/w all forms in office; no new discoveries; Narciso neg.  --  MILD ANEMIA as of 10/20 OV=11.2, down 1 gram past year; no bleeding; f/u on RTO---> Hgb 12 with nl iron as of 4/21, so defer f/u.  --  HTN remains controlled off ACEI as of 7/18 OV; bring machine in=150's at home (stopped ACEI due to severe angiodedema following bug bites); will use norvasc 2.5 if BP trends back up...start now 1/19...better 4/19---> holding 10/19 on these=ditto 4/21.  ?CKD3 = 53% in 4/20 = watch on RTO---> fine 10/20 and same 4/21.  --  LIPIDS at goal '12 w/o tx...ditto 8/15 with HDL 86..., but no tx unless event...141...121 with HDL 95, so defer tx 10/19---> , so I d/w get it back in the 120's please.  --  C/O R HIP PAIN...exam with excellent ROM, neg SLR, point tenderness c/w bursitis...try meds for now and call...better and then returned, no trauma, intermittent, worse up stairs, but able to mow/trim 5 acres, PTA for eval/HEP...worse after PT, s/p injection per ortho in [place] and prn mobic... HIP now, saw ortho in [place], story sounds radicular, but exam more c/w bursitis, so try medrol and keep using heat; also told me ortho was going to conrad MRI and I rec gabapentin again...MRI hip=bursitis and spine with DJD; things are better and only tolerating low dose gabapentin...stable off=ran out and no worse.  R KNEE PAIN=prior  scopes; sx's flaring 1/18, but not severe yet, so no new tx rec...get steroid shots per Dr Vo prn---> L knee issues as of 4/21 and is in PT.  S/P R r TSR on 4/17/19 per Dr Vo...has completed rehab as of 10/19 OV; no pain meds needed.  --  SHINGLES rash is drying up, no secondary infection, but is very sensitive; c/w meds and will push dose...she's weaning dose, but still with sxs 6/17.  L NECK PAIN needing to take ASA daily now; ? CINDY vs carotid calcification, no bruit; needs CT and ? dopplers/etc; please call...it was neg.  BALANCE ISSUES past few months, just has to occ grab wall=needs HCT as well...it was neg  --  HYPERCALCEMIA S/P RESECTION 9/15... PTH 40...PTH 73 and is trending up, but Ca++ 8.8 is stable 1/18...PTH 48...40 in 10/19 and will stop checking.    OSTEOPOROSIS...BMD 1/18...spine -1.0, hip -2.4...intol to actonel so on Reclast #5/5 due 1/19---> BMD 10/20 = spine -1.2, hip -2.3 = stable.  VIT D DEF stable (on 2K qd)...55 in 1/19---> 58 in 10/20.  --  GERD/ABD PAIN...to ER 11/13 with CT=mild prominence of CBD and pancreatic duct, labs neg...no melena, no blood...sx's better with 2x PPI...rare sxs 1/18 on no meds, so call if recurrent and would use H2 first line...dysphagia as of 1/19 OV, to water at times, so needs MBSS to start...non-issue.  --  DEPRESSION/INSOMNIA with 's passing...try trazodone first, SSRI if no benefit or intol...with prn ambien...better with PRN restoril.   --  PETICHIAL LESIONS 8/19 on legs=she showed me pictures at 10/19 OV; said it was non-blanchable, no itch, + stinging though; lasted 3-4 days, started in one spot on LLE and spread; I d/w call when recurs and will get labs.  FACIAL SWELLING 4/18 =nasal and bilateral inner eyelids/etc; top of scalp with scabbed area, but no tick/etc; associated erythema; lungs clear, no dysphagia; will treat with abx as well as prednisone and take benadryl as well... recurred 2 weeks later, tried ice/benaryl, to ER, given  keflex/bactrim/steroid shot; no dysphagia nor breathing issues with either episode; did have mosquito bite; pics reviewed; might as well finish out abx; rec no ACEI and zyrtec 10 mg bid for now; wear bug sprays if going out.  --  --  MMG 11/22/19 per me and is pending as of 4/21 OV.  EGD 7/12...erosive gastritis.  COLON 7/12...normal...defer.   REFUSES flu/pneumo; Shingrix x2;  ( '12...after 57 yrs, 4 boys with 1 here and 2 in [place], 1 in ---> had 47 for holidays q year for both holidays=has 22 great-grand already).    Follow Up   Return in about 6 months (around 4/24/2025).  Patient was given instructions and counseling regarding her condition or for health maintenance advice. Please see specific information pulled into the AVS if appropriate.

## 2024-10-24 NOTE — ASSESSMENT & PLAN NOTE
She still has occasional palpitations but certainly less than prior to getting her on low-dose metoprolol, pulse as noted is around 70, stable to continue same.

## 2024-10-24 NOTE — ASSESSMENT & PLAN NOTE
Discussed with patient the bone density is fairly stable, best that could be expected as of her 10/24 OV.  We are continuing with Reclast therapy, patient remains very active, her vitamin D level was 51 earlier this year on low-dose over-the-counter supplementation, and she is aware to get calcium in her diet or supplement with this as well.  Continue current plan of care, she had her last Reclast infusion in 7/24.

## 2024-11-14 ENCOUNTER — LAB (OUTPATIENT)
Dept: LAB | Facility: HOSPITAL | Age: 84
End: 2024-11-14
Payer: MEDICARE

## 2024-11-14 ENCOUNTER — TRANSCRIBE ORDERS (OUTPATIENT)
Dept: LAB | Facility: HOSPITAL | Age: 84
End: 2024-11-14
Payer: MEDICARE

## 2024-11-14 DIAGNOSIS — I10 ESSENTIAL HYPERTENSION: ICD-10-CM

## 2024-11-14 DIAGNOSIS — E55.9 VITAMIN D DEFICIENCY: ICD-10-CM

## 2024-11-14 DIAGNOSIS — E78.2 MIXED HYPERLIPIDEMIA: ICD-10-CM

## 2024-11-14 DIAGNOSIS — Z79.899 NEED FOR PROPHYLACTIC CHEMOTHERAPY: ICD-10-CM

## 2024-11-14 DIAGNOSIS — M81.0 SENILE OSTEOPOROSIS: Primary | ICD-10-CM

## 2024-11-14 DIAGNOSIS — R53.83 OTHER FATIGUE: ICD-10-CM

## 2024-11-14 DIAGNOSIS — M81.0 SENILE OSTEOPOROSIS: ICD-10-CM

## 2024-11-14 DIAGNOSIS — E55.9 AVITAMINOSIS D: ICD-10-CM

## 2024-11-14 LAB
25(OH)D3 SERPL-MCNC: 44 NG/ML (ref 30–100)
ALBUMIN SERPL-MCNC: 4.4 G/DL (ref 3.5–5.2)
ALBUMIN/GLOB SERPL: 1.6 G/DL
ALP SERPL-CCNC: 71 U/L (ref 39–117)
ALT SERPL W P-5'-P-CCNC: 14 U/L (ref 1–33)
ANION GAP SERPL CALCULATED.3IONS-SCNC: 11 MMOL/L (ref 5–15)
AST SERPL-CCNC: 27 U/L (ref 1–32)
BASOPHILS # BLD AUTO: 0.07 10*3/MM3 (ref 0–0.2)
BASOPHILS NFR BLD AUTO: 1.1 % (ref 0–1.5)
BILIRUB SERPL-MCNC: 0.4 MG/DL (ref 0–1.2)
BUN SERPL-MCNC: 20 MG/DL (ref 8–23)
BUN/CREAT SERPL: 25.3 (ref 7–25)
CALCIUM SPEC-SCNC: 9.5 MG/DL (ref 8.6–10.5)
CHLORIDE SERPL-SCNC: 103 MMOL/L (ref 98–107)
CHOLEST SERPL-MCNC: 250 MG/DL (ref 0–200)
CO2 SERPL-SCNC: 25 MMOL/L (ref 22–29)
CORTIS SERPL-MCNC: 7.02 MCG/DL
CREAT SERPL-MCNC: 0.79 MG/DL (ref 0.57–1)
DEPRECATED RDW RBC AUTO: 38.7 FL (ref 37–54)
EGFRCR SERPLBLD CKD-EPI 2021: 73.9 ML/MIN/1.73
EOSINOPHIL # BLD AUTO: 0.16 10*3/MM3 (ref 0–0.4)
EOSINOPHIL NFR BLD AUTO: 2.6 % (ref 0.3–6.2)
ERYTHROCYTE [DISTWIDTH] IN BLOOD BY AUTOMATED COUNT: 11.9 % (ref 12.3–15.4)
FOLATE SERPL-MCNC: 13.5 NG/ML (ref 4.78–24.2)
GLOBULIN UR ELPH-MCNC: 2.7 GM/DL
GLUCOSE SERPL-MCNC: 83 MG/DL (ref 65–99)
HCT VFR BLD AUTO: 35.9 % (ref 34–46.6)
HDLC SERPL-MCNC: 68 MG/DL (ref 40–60)
HGB BLD-MCNC: 12.1 G/DL (ref 12–15.9)
IMM GRANULOCYTES # BLD AUTO: 0.02 10*3/MM3 (ref 0–0.05)
IMM GRANULOCYTES NFR BLD AUTO: 0.3 % (ref 0–0.5)
LDLC SERPL CALC-MCNC: 172 MG/DL (ref 0–100)
LDLC/HDLC SERPL: 2.5 {RATIO}
LYMPHOCYTES # BLD AUTO: 2.19 10*3/MM3 (ref 0.7–3.1)
LYMPHOCYTES NFR BLD AUTO: 35.2 % (ref 19.6–45.3)
MCH RBC QN AUTO: 30.6 PG (ref 26.6–33)
MCHC RBC AUTO-ENTMCNC: 33.7 G/DL (ref 31.5–35.7)
MCV RBC AUTO: 90.7 FL (ref 79–97)
MONOCYTES # BLD AUTO: 0.61 10*3/MM3 (ref 0.1–0.9)
MONOCYTES NFR BLD AUTO: 9.8 % (ref 5–12)
NEUTROPHILS NFR BLD AUTO: 3.18 10*3/MM3 (ref 1.7–7)
NEUTROPHILS NFR BLD AUTO: 51 % (ref 42.7–76)
NRBC BLD AUTO-RTO: 0 /100 WBC (ref 0–0.2)
PLATELET # BLD AUTO: 298 10*3/MM3 (ref 140–450)
PMV BLD AUTO: 9.9 FL (ref 6–12)
POTASSIUM SERPL-SCNC: 3.9 MMOL/L (ref 3.5–5.2)
PROT SERPL-MCNC: 7.1 G/DL (ref 6–8.5)
RBC # BLD AUTO: 3.96 10*6/MM3 (ref 3.77–5.28)
SODIUM SERPL-SCNC: 139 MMOL/L (ref 136–145)
TRIGL SERPL-MCNC: 60 MG/DL (ref 0–150)
VIT B12 BLD-MCNC: 390 PG/ML (ref 211–946)
VLDLC SERPL-MCNC: 10 MG/DL (ref 5–40)
WBC NRBC COR # BLD AUTO: 6.23 10*3/MM3 (ref 3.4–10.8)

## 2024-11-14 PROCEDURE — 82306 VITAMIN D 25 HYDROXY: CPT

## 2024-11-14 PROCEDURE — 82607 VITAMIN B-12: CPT

## 2024-11-14 PROCEDURE — 85025 COMPLETE CBC W/AUTO DIFF WBC: CPT

## 2024-11-14 PROCEDURE — 82746 ASSAY OF FOLIC ACID SERUM: CPT

## 2024-11-14 PROCEDURE — 80061 LIPID PANEL: CPT

## 2024-11-14 PROCEDURE — 36415 COLL VENOUS BLD VENIPUNCTURE: CPT

## 2024-11-14 PROCEDURE — 80053 COMPREHEN METABOLIC PANEL: CPT

## 2024-11-14 PROCEDURE — 82533 TOTAL CORTISOL: CPT

## 2024-11-20 ENCOUNTER — TRANSCRIBE ORDERS (OUTPATIENT)
Dept: ADMINISTRATIVE | Facility: HOSPITAL | Age: 84
End: 2024-11-20
Payer: MEDICARE

## 2024-11-20 DIAGNOSIS — R20.0 NUMBNESS AND TINGLING IN LEFT HAND: ICD-10-CM

## 2024-11-20 DIAGNOSIS — R20.0 TACTILE ANESTHESIA: Primary | ICD-10-CM

## 2024-11-20 DIAGNOSIS — R20.2 NUMBNESS AND TINGLING IN LEFT HAND: ICD-10-CM

## 2024-12-10 ENCOUNTER — HOSPITAL ENCOUNTER (OUTPATIENT)
Facility: HOSPITAL | Age: 84
Discharge: HOME | End: 2024-12-10
Payer: MEDICARE

## 2024-12-10 DIAGNOSIS — R20.0 NUMBNESS AND TINGLING IN LEFT HAND: ICD-10-CM

## 2024-12-10 DIAGNOSIS — R20.2 NUMBNESS AND TINGLING IN LEFT HAND: ICD-10-CM

## 2024-12-10 PROCEDURE — 95886 MUSC TEST DONE W/N TEST COMP: CPT

## 2024-12-10 PROCEDURE — 95909 NRV CNDJ TST 5-6 STUDIES: CPT

## 2024-12-30 ENCOUNTER — TELEPHONE (OUTPATIENT)
Dept: INTERNAL MEDICINE | Age: 84
End: 2024-12-30

## 2024-12-30 RX ORDER — PREDNISONE 20 MG/1
20 TABLET ORAL 2 TIMES DAILY
Qty: 10 TABLET | Refills: 0 | Status: SHIPPED | OUTPATIENT
Start: 2024-12-30 | End: 2025-01-04

## 2024-12-30 RX ORDER — CEFDINIR 300 MG/1
300 CAPSULE ORAL 2 TIMES DAILY
Qty: 20 CAPSULE | Refills: 0 | Status: SHIPPED | OUTPATIENT
Start: 2024-12-30

## 2024-12-30 NOTE — TELEPHONE ENCOUNTER
Prescriptions sent to her pharmacy, please let her know.  Patient will need to be seen if symptoms progress.

## 2024-12-30 NOTE — TELEPHONE ENCOUNTER
Caller: Breanna York    Relationship: Self    Best call back number: [phone]     What medication are you requesting: N/A    What are your current symptoms: CONGESTION, COUGHING UP MUCUS    How long have you been experiencing symptoms: TWO WEEKS    Have you had these symptoms before:    [] Yes  [x] No    Have you been treated for these symptoms before:   [] Yes  [x] No    If a prescription is needed, what is your preferred pharmacy and phone number: [id number] [address]  [phone] [facility] [phone]      Additional notes: PATIENT WOULD LIKE TO KNOW WHAT DR CRUM WOULD RECOMMEND FOR HER TO DO

## 2024-12-30 NOTE — TELEPHONE ENCOUNTER
Pt states that her mucus is light green, congestion, and coughing, she hasn't been running a fever. She didn't know if you would call something in.  She has been taking Mucinex, but she doesn't feel that it is helping.

## 2025-01-23 RX ORDER — METOPROLOL SUCCINATE 25 MG/1
25 TABLET, EXTENDED RELEASE ORAL DAILY
Qty: 90 TABLET | Refills: 1 | Status: SHIPPED | OUTPATIENT
Start: 2025-01-23

## 2025-03-03 ENCOUNTER — HOSPITAL ENCOUNTER (OUTPATIENT)
Dept: MAMMOGRAPHY | Facility: HOSPITAL | Age: 85
Discharge: HOME | End: 2025-03-03
Admitting: INTERNAL MEDICINE
Payer: MEDICARE

## 2025-03-03 DIAGNOSIS — Z12.31 BREAST CANCER SCREENING BY MAMMOGRAM: ICD-10-CM

## 2025-03-03 PROCEDURE — 77063 BREAST TOMOSYNTHESIS BI: CPT

## 2025-03-03 PROCEDURE — 77067 SCR MAMMO BI INCL CAD: CPT

## 2025-03-04 ENCOUNTER — TELEPHONE (OUTPATIENT)
Age: 85
End: 2025-03-04
Payer: MEDICARE

## 2025-03-04 DIAGNOSIS — Z12.31 ENCOUNTER FOR SCREENING MAMMOGRAM FOR BREAST CANCER: Primary | ICD-10-CM

## 2025-03-04 NOTE — TELEPHONE ENCOUNTER
----- Message from Topher Perera sent at 3/3/2025  1:43 PM EST -----  Please let patient know her screening mammogram is normal and please place an order for another next year.

## 2025-03-18 ENCOUNTER — OFFICE VISIT (OUTPATIENT)
Dept: ORTHOPEDIC SURGERY | Facility: CLINIC | Age: 85
End: 2025-03-18
Payer: MEDICARE

## 2025-03-18 VITALS
WEIGHT: 132 LBS | DIASTOLIC BLOOD PRESSURE: 68 MMHG | HEIGHT: 62 IN | BODY MASS INDEX: 24.29 KG/M2 | OXYGEN SATURATION: 95 % | SYSTOLIC BLOOD PRESSURE: 122 MMHG | HEART RATE: 82 BPM

## 2025-03-18 DIAGNOSIS — M25.562 LEFT KNEE PAIN, UNSPECIFIED CHRONICITY: ICD-10-CM

## 2025-03-18 DIAGNOSIS — M17.12 OSTEOARTHRITIS OF LEFT KNEE, UNSPECIFIED OSTEOARTHRITIS TYPE: Primary | ICD-10-CM

## 2025-03-18 RX ORDER — TRIAMCINOLONE ACETONIDE 40 MG/ML
40 INJECTION, SUSPENSION INTRA-ARTICULAR; INTRAMUSCULAR
Status: COMPLETED | OUTPATIENT
Start: 2025-03-18 | End: 2025-03-18

## 2025-03-18 RX ORDER — LIDOCAINE HYDROCHLORIDE 10 MG/ML
5 INJECTION, SOLUTION INFILTRATION; PERINEURAL
Status: COMPLETED | OUTPATIENT
Start: 2025-03-18 | End: 2025-03-18

## 2025-03-18 RX ADMIN — LIDOCAINE HYDROCHLORIDE 5 ML: 10 INJECTION, SOLUTION INFILTRATION; PERINEURAL at 15:46

## 2025-03-18 RX ADMIN — TRIAMCINOLONE ACETONIDE 40 MG: 40 INJECTION, SUSPENSION INTRA-ARTICULAR; INTRAMUSCULAR at 15:46

## 2025-03-18 NOTE — PROGRESS NOTES
"Chief Complaint  Follow-up of the Left Knee     Subjective      Breanna York presents to [facility] for follow up of the left knee.  She has treated her left knee osteo arthritis conservatively with injections.  She has had increase pain in the left knee the last few months.  She has pain and tightness in the left knee.  She has pain with weight bearing of the left knee.  She has pain and difficulty with stairs and prolonged standing and ambulation.      Allergies   Allergen Reactions    Ace Inhibitors Angioedema     This occurred after some insect bites, but still need to defer this class of medications going forward    Meperidine Hallucinations    Latex Rash        Social History     Socioeconomic History    Marital status:    Tobacco Use    Smoking status: Never    Smokeless tobacco: Never   Vaping Use    Vaping status: Never Used   Substance and Sexual Activity    Alcohol use: Never    Drug use: Never    Sexual activity: Not Currently     Comment:         I reviewed the patient's chief complaint, history of present illness, review of systems, past medical history, surgical history, family history, social history, medications, and allergy list.     Review of Systems     Constitutional: Denies fevers, chills, weight loss  Cardiovascular: Denies chest pain, shortness of breath  Skin: Denies rashes, acute skin changes  Neurologic: Denies headache, loss of consciousness        Vital Signs:   /68   Pulse 82   Ht 157.5 cm (62.01\")   Wt 59.9 kg (132 lb)   SpO2 95%   BMI 24.14 kg/m²          Physical Exam  General: Alert. No acute distress    Ortho Exam        LEFT KNEE Flexion 110. Extension 0. Stable to varus/valgus stress. Stable to anterior/posterior drawer. Neurovascularly intact. Negative Talisha. Negative Lachman. Positive EHL, FHL, HS and TA. Sensation intact to light touch all 5 nerves of the foot. Ambulates with Antalgic gait. Patella is well tracking. Calf " supple, non-tender. Positive tenderness to the medial joint line. Negative tenderness to the lateral joint line. Positive Crepitus. Good strength to hamstrings, quadriceps, dorsiflexors, and plantar flexors.  Knee Extensor Mechanism intact Mild swelling.         Large Joint: L knee  Date/Time: 3/18/2025 3:46 PM  Consent given by: patient  Site marked: site marked  Timeout: Immediately prior to procedure a time out was called to verify the correct patient, procedure, equipment, support staff and site/side marked as required   Supporting Documentation  Indications: pain   Procedure Details  Location: knee - L knee  Preparation: Patient was prepped and draped in the usual sterile fashion  Needle gauge: 21 G.  Medications administered: 5 mL lidocaine 1 %; 40 mg triamcinolone acetonide 40 MG/ML  Patient tolerance: patient tolerated the procedure well with no immediate complications      This injection documentation was Scribed for Candice Vo MD by LOS Farley.  03/18/25   15:47 EDT      Imaging Results (Most Recent)       Procedure Component Value Units Date/Time    XR Knee 3 View Left [[id number]] Resulted: 03/18/25 1542     Updated: 03/18/25 1542    Narrative:      X-Ray Report:  Left knee X-Ray  Indication: Evaluation of the left knee  AP/Lateral and Byram view(s)  Findings: Advanced degenerative changes of the left knee.    Prior studies available for comparison: yes                   Result Review :      X-Ray Report:  Left knee X-Ray  Indication: Evaluation of the left knee  AP/Lateral and Byram view(s)  Findings: Advanced degenerative changes of the left knee.    Prior studies available for comparison: yes             Assessment and Plan     Diagnoses and all orders for this visit:    1. Osteoarthritis of left knee, unspecified osteoarthritis type (Primary)  -     XR Knee 3 View Left    2. Left knee pain, unspecified chronicity  -     XR Knee 3 View Left    Other orders  -     Large Joint: L  knee        Discussed the treatment plan with the patient. I reviewed the X-rays that were obtained today with the patient.     Discussed the risks and benefits of conservative measures. The patient expressed understanding and wished to proceed with a left knee steroid injection.  She tolerated the injection well.     Discussed with the patient that due to the steroid injection given today in the office they may see an increase in blood sugar for a few days. Advised patient to monitor sugar after receiving the injection.     Discussed possibility of a reaction from the injection.  Discussed the possibility that the injection may not completely improve or remove the pain.  Discussed the risk of infection.        Call or return if worsening symptoms.    Follow Up     PRN      Patient was given instructions and counseling regarding her condition or for health maintenance advice. Please see specific information pulled into the AVS if appropriate.     Scribed for Candice Vo MD by Elidia Pineda MA.  03/18/25   15:16 EDT    I have personally performed the services described in this document as scribed by the above individual and it is both accurate and complete. Candice Vo MD 03/18/25

## 2025-03-27 ENCOUNTER — HOSPITAL ENCOUNTER (OUTPATIENT)
Dept: GENERAL RADIOLOGY | Facility: HOSPITAL | Age: 85
Discharge: HOME | End: 2025-03-27
Payer: MEDICARE

## 2025-03-27 ENCOUNTER — OFFICE VISIT (OUTPATIENT)
Age: 85
End: 2025-03-27
Payer: MEDICARE

## 2025-03-27 VITALS
BODY MASS INDEX: 23.7 KG/M2 | HEART RATE: 72 BPM | OXYGEN SATURATION: 98 % | HEIGHT: 62 IN | DIASTOLIC BLOOD PRESSURE: 60 MMHG | SYSTOLIC BLOOD PRESSURE: 130 MMHG | TEMPERATURE: 98 F | WEIGHT: 128.8 LBS

## 2025-03-27 DIAGNOSIS — R07.81 RIB PAIN ON LEFT SIDE: ICD-10-CM

## 2025-03-27 DIAGNOSIS — R10.9 LEFT FLANK PAIN: Primary | ICD-10-CM

## 2025-03-27 DIAGNOSIS — R10.9 LEFT FLANK PAIN: ICD-10-CM

## 2025-03-27 PROCEDURE — 71100 X-RAY EXAM RIBS UNI 2 VIEWS: CPT

## 2025-03-27 PROCEDURE — 71046 X-RAY EXAM CHEST 2 VIEWS: CPT

## 2025-03-27 RX ORDER — MELOXICAM 7.5 MG/1
TABLET ORAL
Qty: 21 TABLET | Refills: 1 | Status: SHIPPED | OUTPATIENT
Start: 2025-03-27

## 2025-03-27 RX ORDER — TRAMADOL HYDROCHLORIDE 50 MG/1
TABLET ORAL
Qty: 30 TABLET | Refills: 1 | Status: SHIPPED | OUTPATIENT
Start: 2025-03-27

## 2025-03-27 NOTE — PROGRESS NOTES
Chief Complaint  pain in left rib area (She states that if she is sitting she is ok, she states that if she bends over without putting her hand on her rib area she is is in severe pain. /This started Monday. No other changes. )    Subjective          Breanna York presents to [facility]     Abdominal Pain  This is a new problem. The current episode started in the past 7 days. The onset quality is sudden. The problem occurs constantly. The pain is located in the left flank. The quality of the pain is aching. The abdominal pain does not radiate. Pertinent negatives include no anorexia, arthralgias, belching, constipation, diarrhea, dysuria, fever, flatus, frequency, hematochezia, hematuria, melena, myalgias, nausea, vomiting or weight loss. The pain is aggravated by certain positions and movement. The pain is relieved by Being still and certain positions.     History of present illness:  Pleasant 83 yo female with HTN, HYPERLIPIDEMIA, DJD, among others, coming in 10/24 for routine 6-month f/u.  We will go over her med list, review her labs in detail, and address any new concerns she may have.---> Patient being seen early in 3/25 for left flank pain as described above.    ---> seen 10/23 for Hosp F/U:  Postop nausea vomiting -improved  Primary osteoarthritis right knee status post right total knee arthroplasty -continue pain medicine, DVT prophylaxis  History of hypertension -resume home antihypertensives upon discharge  ... Patient ended up working well with physical therapy today, patient believes she can return home safely.  Patient will require home health services at discharge, patient will follow-up with orthopedic surgery in 2 weeks.     Review of Systems   Constitutional:  Positive for fatigue. Negative for appetite change, fever and unexpected weight loss.   HENT:  Negative for congestion and ear pain.    Eyes:  Negative for blurred vision.   Respiratory:  Negative for  "cough, chest tightness, shortness of breath and wheezing.    Cardiovascular:  Negative for chest pain, palpitations and leg swelling.   Gastrointestinal:  Negative for abdominal pain, anorexia, constipation, diarrhea, flatus, hematochezia, melena, nausea and vomiting.   Genitourinary:  Negative for difficulty urinating, dysuria, frequency and hematuria.   Musculoskeletal:  Negative for arthralgias, gait problem and myalgias.   Skin:  Negative for skin lesions.   Neurological:  Negative for syncope, memory problem and confusion.   Psychiatric/Behavioral:  Negative for self-injury and depressed mood.        Objective   Vital Signs:   /60   Pulse 72   Temp 98 °F (36.7 °C) (Oral)   Ht 157.5 cm (62.01\")   Wt 58.4 kg (128 lb 12.8 oz)   SpO2 98%   BMI 23.55 kg/m²           Physical Exam  Vitals and nursing note reviewed.   Constitutional:       General: She is not in acute distress.     Appearance: Normal appearance. She is not toxic-appearing.   HENT:      Head: Atraumatic.      Right Ear: External ear normal.      Left Ear: External ear normal.      Nose: Nose normal.      Mouth/Throat:      Mouth: Mucous membranes are moist.   Eyes:      General:         Right eye: No discharge.         Left eye: No discharge.      Extraocular Movements: Extraocular movements intact.      Pupils: Pupils are equal, round, and reactive to light.   Cardiovascular:      Rate and Rhythm: Normal rate and regular rhythm.      Pulses: Normal pulses.      Heart sounds: Normal heart sounds. No murmur heard.     No gallop.   Pulmonary:      Effort: Pulmonary effort is normal. No respiratory distress.      Breath sounds: No wheezing, rhonchi or rales.   Abdominal:      General: There is no distension.      Palpations: Abdomen is soft. There is no mass.      Tenderness: There is no abdominal tenderness. There is no guarding.   Musculoskeletal:         General: No swelling or tenderness.      Cervical back: No tenderness.      Right lower " leg: Edema present.      Left lower leg: No edema.      Comments: Good 2+ edema post-op R TKR.   Skin:     General: Skin is warm and dry.      Findings: No rash.   Neurological:      General: No focal deficit present.      Mental Status: She is alert and oriented to person, place, and time. Mental status is at baseline.      Motor: No weakness.      Gait: Gait normal.   Psychiatric:         Mood and Affect: Mood normal.         Thought Content: Thought content normal.          Result Review :   The following data was reviewed by: Topher Perera MD on 10/21/2021:             Assessment and Plan    Diagnoses and all orders for this visit:    1. Left flank pain (Primary)  Assessment & Plan:  This indication for her 3/25 urgent visit.  Been going on for the past week.  There is been no acute illness per se, no coughing, no change in her bowel or bladder habits, no blood in the stool.  Mainly in the left flank region, no history of kidney stone, no blood in urine either.    Patient with clear lungs on exam, no labored respirations, her sats are 98% on room air.  No spinous process tenderness and she had negative straight leg raise.  Her abdomen is benign, no obvious hepatosplenomegaly.  She is tender to palpation in the left lower costal margin.    Will treat per orders, also recommend patient increase her gabapentin by 100 mg if tolerated, will get chest x-ray with rib detail to start, and evaluate further based on findings.    Orders:  -     Cancel: XR Ribs Left With PA Chest; Future  -     XR Chest PA & Lateral; Future  -     traMADol (ULTRAM) 50 MG tablet; 1 to 2 tablets up to 3 times a day as needed for persistent pain.  Dispense: 30 tablet; Refill: 1    2. Rib pain on left side  -     Cancel: XR Ribs Left With PA Chest; Future  -     XR Chest PA & Lateral; Future  -     traMADol (ULTRAM) 50 MG tablet; 1 to 2 tablets up to 3 times a day as needed for persistent pain.  Dispense: 30 tablet; Refill: 1    Other orders  -      meloxicam (Mobic) 7.5 MG tablet; 2 tablets once daily for a week, then 1 tablet once daily for a week.  Take with food.  Dispense: 21 tablet; Refill: 1          BMI is within normal parameters. No other follow-up for BMI required.     Total Time Spent:    minutes     This time includes time spent by me in the following activities: preparing for the visit, reviewing extensive past medical history and tests, performing a medically appropriate examination and/or evaluation, counseling and educating the patient and/or caregivers, ordering medications, tests, or procedures, referring and/or communicating with other health care professionals and documenting information in the medical record all on this date of service.      --  --  OLDER NOTES:  URGENT VISIT 10/20:  L NECK PAIN just like 12/16 note below; I d/w C-spine series and take aleve 1 tab bid---> signif DJD as expected; did not have to stay on aleve; no radiation;   TACHYCARDIA = HR up 70 to 90+; has CBC/TSH conrad, so will review them when done and eval further on RTO; no CP...TSH neg, CBC with Hgb 11.2; since resolved; was related to Covid infection she said.  COVID-19 + on Labor Day.  --  VISIT 4/21 = above/below.  ANNUAL MEDICARE WELLNESS 4/21 = d/w all forms in office; no new discoveries; Narciso neg.  --  MILD ANEMIA as of 10/20 OV=11.2, down 1 gram past year; no bleeding; f/u on RTO---> Hgb 12 with nl iron as of 4/21, so defer f/u.  --  HTN remains controlled off ACEI as of 7/18 OV; bring machine in=150's at home (stopped ACEI due to severe angiodedema following bug bites); will use norvasc 2.5 if BP trends back up...start now 1/19...better 4/19---> holding 10/19 on these=ditto 4/21.  ?CKD3 = 53% in 4/20 = watch on RTO---> fine 10/20 and same 4/21.  --  LIPIDS at goal '12 w/o tx...ditto 8/15 with HDL 86..., but no tx unless event...141...121 with HDL 95, so defer tx 10/19---> , so I d/w get it back in the 120's please.  --  C/O R HIP  PAIN...exam with excellent ROM, neg SLR, point tenderness c/w bursitis...try meds for now and call...better and then returned, no trauma, intermittent, worse up stairs, but able to mow/trim 5 acres, PTA for eval/HEP...worse after PT, s/p injection per ortho in [place] and prn mobic... HIP now, saw ortho in [place], story sounds radicular, but exam more c/w bursitis, so try medrol and keep using heat; also told me ortho was going to [facility] and I rec gabapentin again...MRI hip=bursitis and spine with DJD; things are better and only tolerating low dose gabapentin...stable off=ran out and no worse.  R KNEE PAIN=prior scopes; sx's flaring 1/18, but not severe yet, so no new tx rec...get steroid shots per Dr Vo prn---> L knee issues as of 4/21 and is in PT.  S/P R r TSR on 4/17/19 per Dr Vo...has completed rehab as of 10/19 OV; no pain meds needed.  --  SHINGLES rash is drying up, no secondary infection, but is very sensitive; c/w meds and will push dose...she's weaning dose, but still with sxs 6/17.  L NECK PAIN needing to take ASA daily now; ? CINDY vs carotid calcification, no bruit; needs CT and ? dopplers/etc; please call...it was neg.  BALANCE ISSUES past few months, just has to occ grab wall=needs HCT as well...it was neg  --  HYPERCALCEMIA S/P RESECTION 9/15... PTH 40...PTH 73 and is trending up, but Ca++ 8.8 is stable 1/18...PTH 48...40 in 10/19 and will stop checking.    OSTEOPOROSIS...BMD 1/18...spine -1.0, hip -2.4...intol to actonel so on Reclast #5/5 due 1/19---> BMD 10/20 = spine -1.2, hip -2.3 = stable.  VIT D DEF stable (on 2K qd)...55 in 1/19---> 58 in 10/20.  --  GERD/ABD PAIN...to ER 11/13 with CT=mild prominence of CBD and pancreatic duct, labs neg...no melena, no blood...sx's better with 2x PPI...rare sxs 1/18 on no meds, so call if recurrent and would use H2 first line...dysphagia as of 1/19 OV, to water at times, so needs MBSS to start...non-issue.  --  DEPRESSION/INSOMNIA with 's  passing...try trazodone first, SSRI if no benefit or intol...with prn ambien...better with PRN restoril.   --  PETICHIAL LESIONS 8/19 on legs=she showed me pictures at 10/19 OV; said it was non-blanchable, no itch, + stinging though; lasted 3-4 days, started in one spot on LLE and spread; I d/w call when recurs and will get labs.  FACIAL SWELLING 4/18 =nasal and bilateral inner eyelids/etc; top of scalp with scabbed area, but no tick/etc; associated erythema; lungs clear, no dysphagia; will treat with abx as well as prednisone and take benadryl as well... recurred 2 weeks later, tried ice/benaryl, to ER, given keflex/bactrim/steroid shot; no dysphagia nor breathing issues with either episode; did have mosquito bite; pics reviewed; might as well finish out abx; rec no ACEI and zyrtec 10 mg bid for now; wear bug sprays if going out.  --  --  MMG 11/22/19 per me and is pending as of 4/21 OV.  EGD 7/12...erosive gastritis.  COLON 7/12...normal...defer.   REFUSES flu/pneumo; Shingrix x2;  ( '12...after 57 yrs, 4 boys with 1 here and 2 in [place], 1 in ---> had 47 for holidays q year for both holidays=has 22 great-grand already).    Follow Up   Return for Next scheduled follow up.  Patient was given instructions and counseling regarding her condition or for health maintenance advice. Please see specific information pulled into the AVS if appropriate.

## 2025-03-27 NOTE — ASSESSMENT & PLAN NOTE
This indication for her 3/25 urgent visit.  Been going on for the past week.  There is been no acute illness per se, no coughing, no change in her bowel or bladder habits, no blood in the stool.  Mainly in the left flank region, no history of kidney stone, no blood in urine either.    Patient with clear lungs on exam, no labored respirations, her sats are 98% on room air.  No spinous process tenderness and she had negative straight leg raise.  Her abdomen is benign, no obvious hepatosplenomegaly.  She is tender to palpation in the left lower costal margin.    Will treat per orders, also recommend patient increase her gabapentin by 100 mg if tolerated, will get chest x-ray with rib detail to start, and evaluate further based on findings.

## 2025-03-31 ENCOUNTER — TELEPHONE (OUTPATIENT)
Age: 85
End: 2025-03-31
Payer: MEDICARE

## 2025-03-31 NOTE — TELEPHONE ENCOUNTER
Films have not been read yet, please look for them later today or tomorrow and let me know, thanks.

## 2025-03-31 NOTE — TELEPHONE ENCOUNTER
Caller: Breanna York    Relationship: Self    Best call back number: [phone]     Caller requesting test results: SELF    What test was performed: XRAY    When was the test performed: 3/27/25    Where was the test performed: [facility]

## 2025-04-01 NOTE — TELEPHONE ENCOUNTER
Caller: Breanna York    Relationship to patient: Self    Best call back number: [phone]    Patient is needing: PATIENT CALLED IN AND IS REQUESTING A CALL BACK REGARDING HER X-RAY RESULTS FROM 3/27/2025 PLEASE.

## 2025-04-02 NOTE — TELEPHONE ENCOUNTER
Please let patient know the chest x-ray is clear, no pneumonia etc.  Additionally no evidence of rib fracture.  Sounds like she has a bruised rib or pulled muscle, hopefully will improve with treatment already prescribed, reach back out to us if not.

## 2025-04-18 ENCOUNTER — OFFICE VISIT (OUTPATIENT)
Dept: PODIATRY | Facility: CLINIC | Age: 85
End: 2025-04-18
Payer: MEDICARE

## 2025-04-18 VITALS
HEART RATE: 80 BPM | BODY MASS INDEX: 23.55 KG/M2 | HEIGHT: 62 IN | OXYGEN SATURATION: 96 % | WEIGHT: 128 LBS | DIASTOLIC BLOOD PRESSURE: 78 MMHG | SYSTOLIC BLOOD PRESSURE: 154 MMHG

## 2025-04-18 DIAGNOSIS — L60.0 INGROWN TOENAIL: Primary | ICD-10-CM

## 2025-04-18 DIAGNOSIS — B35.1 ONYCHOMYCOSIS: ICD-10-CM

## 2025-04-18 NOTE — PROGRESS NOTES
[facility] - PODIATRY    Today's Date: 04/18/25    Patient Name: Breanna York  MRN: [id number]  CSN: [id number]  PCP: Topher Perera MD,   Referring Provider: Referring, Self    SUBJECTIVE     Chief Complaint   Patient presents with    Right Foot - Establish Care, Nail Problem, Toe Pain     Great toenail, 2nd toenail curves in      HPI: Breanna York, a 84 y.o.female, presents to clinic.    History of Present Illness  The patient presents for evaluation of her toes.    She reports discomfort in his toes, describing a sensation of the nail digging into the corners.  She has been unable to manage this issue independently due to difficulty in accessing the affected area.  Who is not diabetic. Additionally, he mentions an incident from 07/2024 where she dropped a heavy object on his big toe while performing yard work. This resulted in persistent soreness and a blackened area under the nail, which has since grown out.  She also notes that he has undergone knee replacement surgery, which further complicates his ability to address the issue himself.         Past Medical History:   Diagnosis Date    Allergic 1956-57.  1984    Rubber,,  Demoral    Anemia     In the 1960’s    Arthritis     Cancer     BASAL CELL    Cataract Sept 2012    Implants    CTS (carpal tunnel syndrome)     Diagnosed by EMG    Headache, tension-type     History of transfusion 1984    HL (hearing loss)     Hyperlipemia     Hypertension     Impingement syndrome of right shoulder 09/13/2018    Limb swelling     Migraine 1951    Started as a child ending about 15-18 yrs ago    Osteopenia     Primary osteoarthritis of both hips 09/13/2018    Primary osteoarthritis of right knee 05/31/2018    Rotator cuff syndrome     Shingles 2017???    Thyroid disorder      Past Surgical History:   Procedure Laterality Date    CATARACT EXTRACTION, BILATERAL      COLONOSCOPY      EYE SURGERY  Sept 2012    FLEXOR TENDON REPAIR      HAND SURGERY  2010     HYSTERECTOMY  Mar 1984    Partial    JOINT REPLACEMENT  April ,2019    Right reverse shoulder replacement.    KNEE CARTILAGE SURGERY Bilateral     SHOULDER SURGERY  April 2019    SKIN CANCER EXCISION      CALLY    SUBTOTAL HYSTERECTOMY  1984    THYROID SURGERY      TUMOR REMOVED    TOTAL KNEE ARTHROPLASTY Right 10/16/2023    Procedure: RIGHT TOTAL KNEE ARTHROPLASTY;  Surgeon: Candice Vo MD;  Location: [facility] MAIN OR;  Service: Orthopedics;  Laterality: Right;    TOTAL SHOULDER REPLACEMENT Right     ULNAR TUNNEL RELEASE      WRIST SURGERY Right 2004???    TENDON REPAIR     Family History   Problem Relation Age of Onset    Heart disease Mother         Attack,enlarged    Cancer Mother         Basalcell carsinoma    Arthritis Mother     Hypertension Mother     Migraines Mother     Heart disease Father         Enlarged    Arthritis Father     Anesthesia problems Father         Trouble waking up    Parkinsonism Father     Heart disease Sister     Cancer Sister         Melanoma    Osteoporosis Sister     Migraines Sister     Arthritis Sister     COPD Sister     Miscarriages / Stillbirths Sister     Heart disease Son         Stents    Cancer Son         Melanoma    Cancer Brother         Liver/ pancreatic    Parkinsonism Brother     Seizures Brother     Heart disease Son         Stents    Heart disease Sister         By pass    Cancer Sister         Lung    Heart disease Sister         A-fib    COPD Sister     Cancer Sister     Rheumatologic disease Sister     Cancer Sister         Baselcell    Cancer Sister     Arthritis Sister     Osteoporosis Sister     Rheumatologic disease Sister     Arthritis Sister     Miscarriages / Stillbirths Sister      Social History     Socioeconomic History    Marital status:    Tobacco Use    Smoking status: Never    Smokeless tobacco: Never   Vaping Use    Vaping status: Never Used   Substance and Sexual Activity    Alcohol use: Never    Drug use: Never    Sexual activity: Not  Currently     Comment:      Allergies   Allergen Reactions    Ace Inhibitors Angioedema     This occurred after some insect bites, but still need to defer this class of medications going forward    Meperidine Hallucinations    Latex Rash     Current Outpatient Medications   Medication Sig Dispense Refill    amLODIPine (NORVASC) 5 MG tablet Take 1 tablet by mouth Every Night. 90 tablet 1    aspirin 81 MG EC tablet Take 1 tablet by mouth Daily.      Biotin 1000 MCG chewable tablet Chew.      Cholecalciferol 50 MCG (2000 UT) tablet Vitamin D3 50 mcg (2,000 unit) oral tablet take 1 tablet by oral route daily   Active      gabapentin (NEURONTIN) 100 MG capsule Take 2 capsules by mouth every night at bedtime. 180 capsule 1    meloxicam (Mobic) 7.5 MG tablet 2 tablets once daily for a week, then 1 tablet once daily for a week.  Take with food. 21 tablet 1    metoprolol succinate XL (TOPROL-XL) 25 MG 24 hr tablet TAKE 1 TABLET BY MOUTH DAILY 90 tablet 1    traMADol (ULTRAM) 50 MG tablet 1 to 2 tablets up to 3 times a day as needed for persistent pain. 30 tablet 1     No current facility-administered medications for this visit.         OBJECTIVE     Vitals:    04/18/25 0941   BP: 154/78   Pulse: 80   SpO2: 96%       WBC   Date Value Ref Range Status   11/14/2024 6.23 3.40 - 10.80 10*3/mm3 Final     RBC   Date Value Ref Range Status   11/14/2024 3.96 3.77 - 5.28 10*6/mm3 Final     Hemoglobin   Date Value Ref Range Status   11/14/2024 12.1 12.0 - 15.9 g/dL Final     Hematocrit   Date Value Ref Range Status   11/14/2024 35.9 34.0 - 46.6 % Final     MCV   Date Value Ref Range Status   11/14/2024 90.7 79.0 - 97.0 fL Final     MCH   Date Value Ref Range Status   11/14/2024 30.6 26.6 - 33.0 pg Final     MCHC   Date Value Ref Range Status   11/14/2024 33.7 31.5 - 35.7 g/dL Final     RDW   Date Value Ref Range Status   11/14/2024 11.9 (L) 12.3 - 15.4 % Final     RDW-SD   Date Value Ref Range Status   11/14/2024 38.7 37.0 - 54.0  fl Final     MPV   Date Value Ref Range Status   11/14/2024 9.9 6.0 - 12.0 fL Final     Platelets   Date Value Ref Range Status   11/14/2024 298 140 - 450 10*3/mm3 Final     Neutrophil %   Date Value Ref Range Status   11/14/2024 51.0 42.7 - 76.0 % Final     Lymphocyte %   Date Value Ref Range Status   11/14/2024 35.2 19.6 - 45.3 % Final     Monocyte %   Date Value Ref Range Status   11/14/2024 9.8 5.0 - 12.0 % Final     Eosinophil %   Date Value Ref Range Status   11/14/2024 2.6 0.3 - 6.2 % Final     Basophil %   Date Value Ref Range Status   11/14/2024 1.1 0.0 - 1.5 % Final     Immature Grans %   Date Value Ref Range Status   11/14/2024 0.3 0.0 - 0.5 % Final     Neutrophils, Absolute   Date Value Ref Range Status   11/14/2024 3.18 1.70 - 7.00 10*3/mm3 Final     Lymphocytes, Absolute   Date Value Ref Range Status   11/14/2024 2.19 0.70 - 3.10 10*3/mm3 Final     Monocytes, Absolute   Date Value Ref Range Status   11/14/2024 0.61 0.10 - 0.90 10*3/mm3 Final     Eosinophils, Absolute   Date Value Ref Range Status   11/14/2024 0.16 0.00 - 0.40 10*3/mm3 Final     Basophils, Absolute   Date Value Ref Range Status   11/14/2024 0.07 0.00 - 0.20 10*3/mm3 Final     Immature Grans, Absolute   Date Value Ref Range Status   11/14/2024 0.02 0.00 - 0.05 10*3/mm3 Final     nRBC   Date Value Ref Range Status   11/14/2024 0.0 0.0 - 0.2 /100 WBC Final         Lab Results   Component Value Date    GLUCOSE 83 11/14/2024    BUN 20 11/14/2024    CREATININE 0.79 11/14/2024    EGFRIFNONA 62 10/18/2021    BCR 25.3 (H) 11/14/2024    K 3.9 11/14/2024    CO2 25.0 11/14/2024    CALCIUM 9.5 11/14/2024    ALBUMIN 4.4 11/14/2024    AST 27 11/14/2024    ALT 14 11/14/2024       Patient seen in no apparent distress.      PHYSICAL EXAM:     Foot/Ankle Exam    GENERAL  Appearance:  appears stated age  Orientation:  AAOx3  Affect:  appropriate  Gait:  unimpaired  Assistance:  independent  Right shoe gear: casual shoe  Left shoe gear: casual  shoe    VASCULAR     Right Foot Vascularity   Normal vascular exam    Dorsalis pedis:  2+  Posterior tibial:  2+  Skin temperature:  warm  Edema grading:  None  CFT:  < 3 seconds  Pedal hair growth:  Present  Varicosities:  none     Left Foot Vascularity   Normal vascular exam    Dorsalis pedis:  2+  Posterior tibial:  2+  Skin temperature:  warm  Edema grading:  None  CFT:  < 3 seconds  Pedal hair growth:  Present  Varicosities:  none     NEUROLOGIC     Right Foot Neurologic   Normal sensation    Light touch sensation: normal  Vibratory sensation: normal  Hot/Cold sensation: normal  Protective Sensation using Allen-Rudi Monofilament:   Sites intact: 10  Sites tested: 10     Left Foot Neurologic   Normal sensation    Light touch sensation: normal  Vibratory sensation: normal  Hot/Cold sensation:  normal  Protective Sensation using Allen-Rudi Monofilament:   Sites intact: 10  Sites tested: 10    MUSCLE STRENGTH     Right Foot Muscle Strength   Foot dorsiflexion:  4  Foot plantar flexion:  4  Foot inversion:  4  Foot eversion:  4     Left Foot Muscle Strength   Foot dorsiflexion:  4  Foot plantar flexion:  4  Foot inversion:  4  Foot eversion:  4    RANGE OF MOTION     Right Foot Range of Motion   Foot and ankle ROM within normal limits       Left Foot Range of Motion   Foot and ankle ROM within normal limits      DERMATOLOGIC      Right Foot Dermatologic   Skin  Right foot skin is intact.   Nails  1.  Positive for dystrophic nail and ingrown toenail.  2.  Positive for dystrophic nail and ingrown toenail.     Left Foot Dermatologic   Skin  Left foot skin is intact.       RADIOLOGY:          XR Chest PA & Lateral  Result Date: 4/1/2025  Narrative: XR CHEST PA AND LATERAL, XR RIBS 2 VW LEFT Date of Exam: 3/27/2025 3:49 PM EDT Indication: Left flank pain Comparison: 8/1/2018 FINDINGS: The lungs are well-expanded. The heart and pulmonary vasculature are within normal limits. No pleural effusions are  identified. There are no active appearing infiltrates. No evidence of pneumothorax. No displaced left rib fractures are identified. Prior reverse total right shoulder arthroplasty. Scoliosis and degenerative change are present in the thoracolumbar spine.     Impression: No active disease. Electronically Signed: Heriberto Headley MD  4/1/2025 12:13 PM EDT  Workstation ID: [id number]    XR Ribs 2 View Left  Result Date: 4/1/2025  Narrative: XR CHEST PA AND LATERAL, XR RIBS 2 VW LEFT Date of Exam: 3/27/2025 3:49 PM EDT Indication: Left flank pain Comparison: 8/1/2018 FINDINGS: The lungs are well-expanded. The heart and pulmonary vasculature are within normal limits. No pleural effusions are identified. There are no active appearing infiltrates. No evidence of pneumothorax. No displaced left rib fractures are identified. Prior reverse total right shoulder arthroplasty. Scoliosis and degenerative change are present in the thoracolumbar spine.     Impression: No active disease. Electronically Signed: Heriberto Headley MD  4/1/2025 12:13 PM EDT  Workstation ID: [id number]      ASSESSMENT/PLAN     Diagnoses and all orders for this visit:    1. Ingrown toenail (Primary)    2. Onychomycosis        Comprehensive lower extremity examination and evaluation was performed.    Assessment & Plan    He presents with pincher toenails, which have been trimmed during this visit.  She has been advised to perform Epsom salt soaks at home and to ensure thorough cleaning of the corners of his toenails post-trimming. The use of nippers for this purpose has been recommended. If she is unable to manage this independently, she is encouraged to return for professional assistance every 3 to 4 months.           Discussed findings and treatment plan including risks, benefits, and treatment options with patient in detail. Patient agreed with treatment plan.    Medications and allergies reviewed.  Reviewed available lab values along with other pertinent labs.   These were discussed with the patient.    All questions were answered to patient/family satisfaction. Should symptoms fail to improve or worsen they agree to call or return to clinic or to go to the Emergency Department. Discussed the importance of following up with any needed screening tests/labs/specialist appointments and any requested follow-up recommended by me today. Importance of maintaining follow-up discussed and patient accepts that missed appointments can delay diagnosis and potentially lead to worsening of conditions.    Return if symptoms worsen or fail to improve., or sooner if acute issues arise.    Patient or patient representative verbalized consent for the use of Ambient Listening during the visit with  Gino Louis DPM for chart documentation. 4/18/2025  10:21 EDT    This document has been electronically signed by Gino Louis DPM on April 18, 2025 10:22 EDT

## 2025-04-21 ENCOUNTER — LAB (OUTPATIENT)
Dept: LAB | Facility: HOSPITAL | Age: 85
End: 2025-04-21
Payer: MEDICARE

## 2025-04-21 DIAGNOSIS — I10 ESSENTIAL HYPERTENSION: ICD-10-CM

## 2025-04-21 DIAGNOSIS — E78.2 MIXED HYPERLIPIDEMIA: ICD-10-CM

## 2025-04-21 DIAGNOSIS — R53.83 OTHER FATIGUE: ICD-10-CM

## 2025-04-21 LAB
ALBUMIN SERPL-MCNC: 4.3 G/DL (ref 3.5–5.2)
ALBUMIN/GLOB SERPL: 1.5 G/DL
ALP SERPL-CCNC: 77 U/L (ref 39–117)
ALT SERPL W P-5'-P-CCNC: 10 U/L (ref 1–33)
ANION GAP SERPL CALCULATED.3IONS-SCNC: 13.1 MMOL/L (ref 5–15)
AST SERPL-CCNC: 29 U/L (ref 1–32)
BASOPHILS # BLD AUTO: 0.05 10*3/MM3 (ref 0–0.2)
BASOPHILS NFR BLD AUTO: 0.9 % (ref 0–1.5)
BILIRUB SERPL-MCNC: 0.4 MG/DL (ref 0–1.2)
BUN SERPL-MCNC: 14 MG/DL (ref 8–23)
BUN/CREAT SERPL: 15.7 (ref 7–25)
CALCIUM SPEC-SCNC: 9.3 MG/DL (ref 8.6–10.5)
CHLORIDE SERPL-SCNC: 102 MMOL/L (ref 98–107)
CHOLEST SERPL-MCNC: 232 MG/DL (ref 0–200)
CO2 SERPL-SCNC: 23.9 MMOL/L (ref 22–29)
CREAT SERPL-MCNC: 0.89 MG/DL (ref 0.57–1)
DEPRECATED RDW RBC AUTO: 43.5 FL (ref 37–54)
EGFRCR SERPLBLD CKD-EPI 2021: 64 ML/MIN/1.73
EOSINOPHIL # BLD AUTO: 0.12 10*3/MM3 (ref 0–0.4)
EOSINOPHIL NFR BLD AUTO: 2.2 % (ref 0.3–6.2)
ERYTHROCYTE [DISTWIDTH] IN BLOOD BY AUTOMATED COUNT: 12.7 % (ref 12.3–15.4)
GLOBULIN UR ELPH-MCNC: 2.9 GM/DL
GLUCOSE SERPL-MCNC: 85 MG/DL (ref 65–99)
HCT VFR BLD AUTO: 37.6 % (ref 34–46.6)
HDLC SERPL-MCNC: 74 MG/DL (ref 40–60)
HGB BLD-MCNC: 12.1 G/DL (ref 12–15.9)
IMM GRANULOCYTES # BLD AUTO: 0.02 10*3/MM3 (ref 0–0.05)
IMM GRANULOCYTES NFR BLD AUTO: 0.4 % (ref 0–0.5)
LDLC SERPL CALC-MCNC: 143 MG/DL (ref 0–100)
LDLC/HDLC SERPL: 1.89 {RATIO}
LYMPHOCYTES # BLD AUTO: 1.85 10*3/MM3 (ref 0.7–3.1)
LYMPHOCYTES NFR BLD AUTO: 33.6 % (ref 19.6–45.3)
MCH RBC QN AUTO: 30 PG (ref 26.6–33)
MCHC RBC AUTO-ENTMCNC: 32.2 G/DL (ref 31.5–35.7)
MCV RBC AUTO: 93.1 FL (ref 79–97)
MONOCYTES # BLD AUTO: 0.64 10*3/MM3 (ref 0.1–0.9)
MONOCYTES NFR BLD AUTO: 11.6 % (ref 5–12)
NEUTROPHILS NFR BLD AUTO: 2.83 10*3/MM3 (ref 1.7–7)
NEUTROPHILS NFR BLD AUTO: 51.3 % (ref 42.7–76)
NRBC BLD AUTO-RTO: 0 /100 WBC (ref 0–0.2)
PLATELET # BLD AUTO: 270 10*3/MM3 (ref 140–450)
PMV BLD AUTO: 9.7 FL (ref 6–12)
POTASSIUM SERPL-SCNC: 4.3 MMOL/L (ref 3.5–5.2)
PROT SERPL-MCNC: 7.2 G/DL (ref 6–8.5)
RBC # BLD AUTO: 4.04 10*6/MM3 (ref 3.77–5.28)
SODIUM SERPL-SCNC: 139 MMOL/L (ref 136–145)
TRIGL SERPL-MCNC: 89 MG/DL (ref 0–150)
VLDLC SERPL-MCNC: 15 MG/DL (ref 5–40)
WBC NRBC COR # BLD AUTO: 5.51 10*3/MM3 (ref 3.4–10.8)

## 2025-04-21 PROCEDURE — 36415 COLL VENOUS BLD VENIPUNCTURE: CPT

## 2025-04-21 PROCEDURE — 85025 COMPLETE CBC W/AUTO DIFF WBC: CPT

## 2025-04-21 PROCEDURE — 80061 LIPID PANEL: CPT

## 2025-04-21 PROCEDURE — 80053 COMPREHEN METABOLIC PANEL: CPT

## 2025-04-24 ENCOUNTER — OFFICE VISIT (OUTPATIENT)
Age: 85
End: 2025-04-24
Payer: MEDICARE

## 2025-04-24 VITALS
WEIGHT: 130.4 LBS | HEIGHT: 62 IN | DIASTOLIC BLOOD PRESSURE: 70 MMHG | HEART RATE: 74 BPM | BODY MASS INDEX: 24 KG/M2 | OXYGEN SATURATION: 96 % | SYSTOLIC BLOOD PRESSURE: 132 MMHG

## 2025-04-24 DIAGNOSIS — M19.91 PRIMARY LOCALIZED OSTEOARTHRITIS: ICD-10-CM

## 2025-04-24 DIAGNOSIS — E78.2 MIXED HYPERLIPIDEMIA: ICD-10-CM

## 2025-04-24 DIAGNOSIS — R53.83 OTHER FATIGUE: ICD-10-CM

## 2025-04-24 DIAGNOSIS — I10 ESSENTIAL HYPERTENSION: Primary | ICD-10-CM

## 2025-04-24 DIAGNOSIS — R09.89 BRUIT OF RIGHT CAROTID ARTERY: ICD-10-CM

## 2025-04-24 DIAGNOSIS — R00.2 PALPITATIONS: ICD-10-CM

## 2025-04-24 DIAGNOSIS — I35.0 MILD AORTIC STENOSIS: ICD-10-CM

## 2025-04-24 DIAGNOSIS — E55.9 VITAMIN D DEFICIENCY: ICD-10-CM

## 2025-04-24 PROBLEM — R06.09 DYSPNEA ON EXERTION: Status: RESOLVED | Noted: 2024-04-25 | Resolved: 2025-04-24

## 2025-04-24 RX ORDER — AMLODIPINE BESYLATE 5 MG/1
5 TABLET ORAL NIGHTLY
Qty: 90 TABLET | Refills: 1 | Status: SHIPPED | OUTPATIENT
Start: 2025-04-24

## 2025-04-24 RX ORDER — METOPROLOL SUCCINATE 25 MG/1
25 TABLET, EXTENDED RELEASE ORAL DAILY
Qty: 90 TABLET | Refills: 1 | Status: SHIPPED | OUTPATIENT
Start: 2025-04-24

## 2025-04-24 RX ORDER — GABAPENTIN 100 MG/1
200 CAPSULE ORAL
Qty: 180 CAPSULE | Refills: 1 | Status: SHIPPED | OUTPATIENT
Start: 2025-04-24

## 2025-04-24 NOTE — ASSESSMENT & PLAN NOTE
This is very pronounced as of her 4/25 OV, we checked this about 4 years ago, but do not recall it sounding like this, may be related to the newly diagnosed mild aortic stenosis, regardless recommend repeat Doppler.

## 2025-04-24 NOTE — PROGRESS NOTES
Chief Complaint  Hypertension and Follow-up (Pt had labs, this is routine and she has no new issues. )    Subjective          Breanna York presents to [facility]     Abdominal Pain  This is a new problem. The current episode started in the past 7 days. The onset quality is sudden. The problem occurs constantly. The pain is located in the left flank. The quality of the pain is aching. The abdominal pain does not radiate. Pertinent negatives include no anorexia, arthralgias, belching, constipation, diarrhea, dysuria, fever, flatus, frequency, hematochezia, hematuria, melena, myalgias, nausea, vomiting or weight loss. The pain is aggravated by certain positions and movement. The pain is relieved by Being still and certain positions.   Hypertension  Pertinent negatives include no blurred vision, chest pain, palpitations or shortness of breath.     History of present illness:  Pleasant 85 yo female with HTN, HYPERLIPIDEMIA, DJD, among others, coming in 4/25 for routine 6-month f/u.  We will go over her med list, review her labs in detail, and address any new concerns she may have.    ---> seen 10/23 for Hosp F/U:  Postop nausea vomiting -improved  Primary osteoarthritis right knee status post right total knee arthroplasty -continue pain medicine, DVT prophylaxis  History of hypertension -resume home antihypertensives upon discharge  ... Patient ended up working well with physical therapy today, patient believes she can return home safely.  Patient will require home health services at discharge, patient will follow-up with orthopedic surgery in 2 weeks.     Review of Systems   Constitutional:  Positive for fatigue. Negative for appetite change, fever and unexpected weight loss.   HENT:  Negative for congestion and ear pain.    Eyes:  Negative for blurred vision.   Respiratory:  Negative for cough, chest tightness, shortness of breath and wheezing.    Cardiovascular:  Negative for chest  "pain, palpitations and leg swelling.   Gastrointestinal:  Negative for abdominal pain, anorexia, constipation, diarrhea, flatus, hematochezia, melena, nausea and vomiting.   Genitourinary:  Negative for difficulty urinating, dysuria, frequency and hematuria.   Musculoskeletal:  Negative for arthralgias, gait problem and myalgias.   Skin:  Negative for skin lesions.   Neurological:  Negative for syncope, memory problem and confusion.   Psychiatric/Behavioral:  Negative for self-injury and depressed mood.        Objective   Vital Signs:   /70   Pulse 74   Ht 157.5 cm (62.01\")   Wt 59.1 kg (130 lb 6.4 oz)   SpO2 96%   BMI 23.84 kg/m²           Physical Exam  Vitals and nursing note reviewed.   Constitutional:       General: She is not in acute distress.     Appearance: Normal appearance. She is not toxic-appearing.   HENT:      Head: Atraumatic.      Right Ear: External ear normal.      Left Ear: External ear normal.      Nose: Nose normal.      Mouth/Throat:      Mouth: Mucous membranes are moist.   Eyes:      General:         Right eye: No discharge.         Left eye: No discharge.      Extraocular Movements: Extraocular movements intact.      Pupils: Pupils are equal, round, and reactive to light.   Cardiovascular:      Rate and Rhythm: Normal rate and regular rhythm.      Pulses: Normal pulses.      Heart sounds: Normal heart sounds. No murmur heard.     No gallop.   Pulmonary:      Effort: Pulmonary effort is normal. No respiratory distress.      Breath sounds: No wheezing, rhonchi or rales.   Abdominal:      General: There is no distension.      Palpations: Abdomen is soft. There is no mass.      Tenderness: There is no abdominal tenderness. There is no guarding.   Musculoskeletal:         General: No swelling or tenderness.      Cervical back: No tenderness.      Right lower leg: Edema present.      Left lower leg: No edema.      Comments: Good 2+ edema post-op R TKR.   Skin:     General: Skin is warm " and dry.      Findings: No rash.   Neurological:      General: No focal deficit present.      Mental Status: She is alert and oriented to person, place, and time. Mental status is at baseline.      Motor: No weakness.      Gait: Gait normal.   Psychiatric:         Mood and Affect: Mood normal.         Thought Content: Thought content normal.          Result Review :   The following data was reviewed by: Topher Perera MD on 10/21/2021:             Assessment and Plan    Diagnoses and all orders for this visit:    1. Essential hypertension (Primary)  Assessment & Plan:  Blood pressure elida well-controlled and her pulse is around 70 still as of her 4/25 OV.  She is just on low-dose metoprolol and moderate dose amlodipine, certainly appropriate to continue same.    Orders:  -     Comprehensive metabolic panel; Future  -     CBC w AUTO Differential; Future    2. Primary localized osteoarthritis  -     gabapentin (NEURONTIN) 100 MG capsule; Take 2 capsules by mouth every night at bedtime.  Dispense: 180 capsule; Refill: 1    3. Mixed hyperlipidemia  Overview:  CT calcium score 5/24:  Agatston scores for individual coronary arteries are as follows:  Left main (LM): 0  Left anterior descending (LAD): 2.7  Left circumflex (CX): 0  Right coronary artery (RCA): 0  Total 2.7    Assessment & Plan:   His LDL is down from 170 to 140 as of her 4/25 OV.  We are just monitoring this for academic reasons basically, we would not treat her unless she had an event, particularly in light of the above CT calcium score.    Orders:  -     Lipid panel; Future    4. Vitamin D deficiency  -     Vitamin D 25 hydroxy; Future    5. Palpitations  Overview:  Holter 4/24:    Essentially normal 24-hour Holter monitor study.    Underlying rhythm is normal sinus rhythm with an average heart rate of 77 bpm.    Rare isolated PACs and PVCs noted.    There were no arrhythmias.  There were no long pauses.    Patient did not report any symptoms during the  study.    Orders:  -     TSH+Free T4; Future    6. Mild aortic stenosis  Overview:  Echo 4/24:  Normal left ventricular systolic function EF 63%  Mild aortic stenosis.  Mild aortic regurgitation.    Assessment & Plan:  Reviewed the echo at her 4/25 OV, discussed with her we just will check this every couple to 3 years, she is aware of symptoms to let us know about.      7. Other fatigue  Assessment & Plan:  Patient still doing all of her activities outside and inside, but obviously appropriately taking a little longer.    We had the echo on her heart last year as noted above, and she had a normal CT calcium score.    She is not anemic and her thyroid function is fine.    Chest x-ray was clear as well.    D/W pt again I feel this is very appropriate.       8. Bruit of right carotid artery  Overview:  Carotid Dopplers 11/21:  All other left side carotid system vessels are normal.  All other right side carotid system vessels are normal.  Normal carotid-vertebral bilateral duplex scan.      Assessment & Plan:  This is very pronounced as of her 4/25 OV, we checked this about 4 years ago, but do not recall it sounding like this, may be related to the newly diagnosed mild aortic stenosis, regardless recommend repeat Doppler.    Orders:  -     Duplex Carotid Ultrasound CAR; Future    Other orders  -     amLODIPine (NORVASC) 5 MG tablet; Take 1 tablet by mouth Every Night.  Dispense: 90 tablet; Refill: 1  -     metoprolol succinate XL (TOPROL-XL) 25 MG 24 hr tablet; Take 1 tablet by mouth Daily.  Dispense: 90 tablet; Refill: 1          BMI is within normal parameters. No other follow-up for BMI required.     Total Time Spent:    minutes     This time includes time spent by me in the following activities: preparing for the visit, reviewing extensive past medical history and tests, performing a medically appropriate examination and/or evaluation, counseling and educating the patient and/or caregivers, ordering medications,  tests, or procedures, referring and/or communicating with other health care professionals and documenting information in the medical record all on this date of service.      --  --  OLDER NOTES:  URGENT VISIT 10/20:  L NECK PAIN just like 12/16 note below; I d/w C-spine series and take aleve 1 tab bid---> signif DJD as expected; did not have to stay on aleve; no radiation;   TACHYCARDIA = HR up 70 to 90+; has CBC/TSH [facility]rad, so will review them when done and eval further on RTO; no CP...TSH neg, CBC with Hgb 11.2; since resolved; was related to Covid infection she said.  COVID-19 + on Labor Day.  --  VISIT 4/21 = above/below.  ANNUAL MEDICARE WELLNESS 4/21 = d/w all forms in office; no new discoveries; Narciso neg.  --  MILD ANEMIA as of 10/20 OV=11.2, down 1 gram past year; no bleeding; f/u on RTO---> Hgb 12 with nl iron as of 4/21, so defer f/u.  --  HTN remains controlled off ACEI as of 7/18 OV; bring machine in=150's at home (stopped ACEI due to severe angiodedema following bug bites); will use norvasc 2.5 if BP trends back up...start now 1/19...better 4/19---> holding 10/19 on these=ditto 4/21.  ?CKD3 = 53% in 4/20 = watch on RTO---> fine 10/20 and same 4/21.  --  LIPIDS at goal '12 w/o tx...ditto 8/15 with HDL 86..., but no tx unless event...141...121 with HDL 95, so defer tx 10/19---> , so I d/w get it back in the 120's please.  --  C/O R HIP PAIN...exam with excellent ROM, neg SLR, point tenderness c/w bursitis...try meds for now and call...better and then returned, no trauma, intermittent, worse up stairs, but able to mow/trim 5 acres, PTA for eval/HEP...worse after PT, s/p injection per ortho in [place] and prn mobic... HIP now, saw ortho in [place], story sounds radicular, but exam more c/w bursitis, so try medrol and keep using heat; also told me ortho was going to [facility] MRI and I rec gabapentin again...MRI hip=bursitis and spine with DJD; things are better and only tolerating low dose  gabapentin...stable off=ran out and no worse.  R KNEE PAIN=prior scopes; sx's flaring 1/18, but not severe yet, so no new tx rec...get steroid shots per Dr Vo prn---> L knee issues as of 4/21 and is in PT.  S/P R r TSR on 4/17/19 per Dr Vo...has completed rehab as of 10/19 OV; no pain meds needed.  --  SHINGLES rash is drying up, no secondary infection, but is very sensitive; c/w meds and will push dose...she's weaning dose, but still with sxs 6/17.  L NECK PAIN needing to take ASA daily now; ? CINDY vs carotid calcification, no bruit; needs CT and ? dopplers/etc; please call...it was neg.  BALANCE ISSUES past few months, just has to occ grab wall=needs HCT as well...it was neg  --  HYPERCALCEMIA S/P RESECTION 9/15... PTH 40...PTH 73 and is trending up, but Ca++ 8.8 is stable 1/18...PTH 48...40 in 10/19 and will stop checking.    OSTEOPOROSIS...BMD 1/18...spine -1.0, hip -2.4...intol to actonel so on Reclast #5/5 due 1/19---> BMD 10/20 = spine -1.2, hip -2.3 = stable.  VIT D DEF stable (on 2K qd)...55 in 1/19---> 58 in 10/20.  --  GERD/ABD PAIN...to ER 11/13 with CT=mild prominence of CBD and pancreatic duct, labs neg...no melena, no blood...sx's better with 2x PPI...rare sxs 1/18 on no meds, so call if recurrent and would use H2 first line...dysphagia as of 1/19 OV, to water at times, so needs MBSS to start...non-issue.  --  DEPRESSION/INSOMNIA with 's passing...try trazodone first, SSRI if no benefit or intol...with prn ambien...better with PRN restoril.   --  PETICHIAL LESIONS 8/19 on legs=she showed me pictures at 10/19 OV; said it was non-blanchable, no itch, + stinging though; lasted 3-4 days, started in one spot on LLE and spread; I d/w call when recurs and will get labs.  FACIAL SWELLING 4/18 =nasal and bilateral inner eyelids/etc; top of scalp with scabbed area, but no tick/etc; associated erythema; lungs clear, no dysphagia; will treat with abx as well as prednisone and take benadryl as well...  recurred 2 weeks later, tried ice/benaryl, to ER, given keflex/bactrim/steroid shot; no dysphagia nor breathing issues with either episode; did have mosquito bite; pics reviewed; might as well finish out abx; rec no ACEI and zyrtec 10 mg bid for now; wear bug sprays if going out.  --  --  MMG 3/3/2025 per me.  EGD 7/12...erosive gastritis.  COLON 7/12...normal...defer.     REFUSES flu/pneumo; Shingrix x2;    ( '12...after 57 yrs, 4 boys with 1 here and 2 in [place], 1 in ---> had 47 for holidays q year for both holidays=has 22 great-grand already).    Follow Up   Return in about 6 months (around 10/24/2025).  Patient was given instructions and counseling regarding her condition or for health maintenance advice. Please see specific information pulled into the AVS if appropriate.

## 2025-04-24 NOTE — ASSESSMENT & PLAN NOTE
Reviewed the echo at her 4/25 OV, discussed with her we just will check this every couple to 3 years, she is aware of symptoms to let us know about.

## 2025-04-24 NOTE — ASSESSMENT & PLAN NOTE
Blood pressure elida well-controlled and her pulse is around 70 still as of her 4/25 OV.  She is just on low-dose metoprolol and moderate dose amlodipine, certainly appropriate to continue same.

## 2025-04-24 NOTE — ASSESSMENT & PLAN NOTE
His LDL is down from 170 to 140 as of her 4/25 OV.  We are just monitoring this for academic reasons basically, we would not treat her unless she had an event, particularly in light of the above CT calcium score.

## 2025-04-24 NOTE — ASSESSMENT & PLAN NOTE
Patient still doing all of her activities outside and inside, but obviously appropriately taking a little longer.    We had the echo on her heart last year as noted above, and she had a normal CT calcium score.    She is not anemic and her thyroid function is fine.    Chest x-ray was clear as well.    D/W pt again I feel this is very appropriate.

## 2025-06-06 ENCOUNTER — HOSPITAL ENCOUNTER (OUTPATIENT)
Dept: CARDIOLOGY | Facility: HOSPITAL | Age: 85
Discharge: HOME | End: 2025-06-06
Payer: MEDICARE

## 2025-06-06 DIAGNOSIS — R09.89 BRUIT OF RIGHT CAROTID ARTERY: ICD-10-CM

## 2025-06-06 LAB
BH CV XLRA MEAS LEFT CAROTID BULB EDV: 13.9 CM/SEC
BH CV XLRA MEAS LEFT CAROTID BULB PSV: 59.5 CM/SEC
BH CV XLRA MEAS LEFT DIST CCA EDV: 17.5 CM/SEC
BH CV XLRA MEAS LEFT DIST CCA PSV: 65.9 CM/SEC
BH CV XLRA MEAS LEFT DIST ICA EDV: 18.2 CM/SEC
BH CV XLRA MEAS LEFT DIST ICA PSV: 46.4 CM/SEC
BH CV XLRA MEAS LEFT ICA/CCA RATIO: 1.1
BH CV XLRA MEAS LEFT MID ICA EDV: 22 CM/SEC
BH CV XLRA MEAS LEFT MID ICA PSV: 62 CM/SEC
BH CV XLRA MEAS LEFT PROX CCA EDV: 18.5 CM/SEC
BH CV XLRA MEAS LEFT PROX CCA PSV: 79.7 CM/SEC
BH CV XLRA MEAS LEFT PROX ECA EDV: 0 CM/SEC
BH CV XLRA MEAS LEFT PROX ECA PSV: 61.4 CM/SEC
BH CV XLRA MEAS LEFT PROX ICA EDV: 17.3 CM/SEC
BH CV XLRA MEAS LEFT PROX ICA PSV: 45.4 CM/SEC
BH CV XLRA MEAS LEFT VERTEBRAL A EDV: 8.1 CM/SEC
BH CV XLRA MEAS LEFT VERTEBRAL A PSV: 32.1 CM/SEC
BH CV XLRA MEAS RIGHT CAROTID BULB EDV: 21.4 CM/SEC
BH CV XLRA MEAS RIGHT CAROTID BULB PSV: 88.1 CM/SEC
BH CV XLRA MEAS RIGHT DIST CCA EDV: 17 CM/SEC
BH CV XLRA MEAS RIGHT DIST CCA PSV: 60.5 CM/SEC
BH CV XLRA MEAS RIGHT DIST ICA EDV: 29.2 CM/SEC
BH CV XLRA MEAS RIGHT DIST ICA PSV: 86.3 CM/SEC
BH CV XLRA MEAS RIGHT ICA/CCA RATIO: 0.7
BH CV XLRA MEAS RIGHT MID ICA EDV: 18.4 CM/SEC
BH CV XLRA MEAS RIGHT MID ICA PSV: 50.8 CM/SEC
BH CV XLRA MEAS RIGHT PROX CCA EDV: 12.7 CM/SEC
BH CV XLRA MEAS RIGHT PROX CCA PSV: 51.1 CM/SEC
BH CV XLRA MEAS RIGHT PROX ECA EDV: 0 CM/SEC
BH CV XLRA MEAS RIGHT PROX ECA PSV: 80.9 CM/SEC
BH CV XLRA MEAS RIGHT PROX ICA EDV: 15.5 CM/SEC
BH CV XLRA MEAS RIGHT PROX ICA PSV: 64.3 CM/SEC
BH CV XLRA MEAS RIGHT VERTEBRAL A EDV: 16.7 CM/SEC
BH CV XLRA MEAS RIGHT VERTEBRAL A PSV: 62.3 CM/SEC
LEFT ARM BP: NORMAL MMHG
RIGHT ARM BP: NORMAL MMHG

## 2025-06-06 PROCEDURE — 93880 EXTRACRANIAL BILAT STUDY: CPT

## 2025-07-15 ENCOUNTER — LAB (OUTPATIENT)
Dept: LAB | Facility: HOSPITAL | Age: 85
End: 2025-07-15
Payer: MEDICARE

## 2025-07-15 DIAGNOSIS — I10 ESSENTIAL HYPERTENSION: ICD-10-CM

## 2025-07-15 DIAGNOSIS — R00.2 PALPITATIONS: ICD-10-CM

## 2025-07-15 DIAGNOSIS — E55.9 VITAMIN D DEFICIENCY: ICD-10-CM

## 2025-07-15 DIAGNOSIS — E78.2 MIXED HYPERLIPIDEMIA: ICD-10-CM

## 2025-07-15 LAB
25(OH)D3 SERPL-MCNC: 33.3 NG/ML (ref 30–100)
ALBUMIN SERPL-MCNC: 4.3 G/DL (ref 3.5–5.2)
ALBUMIN/GLOB SERPL: 1.5 G/DL
ALP SERPL-CCNC: 72 U/L (ref 39–117)
ALT SERPL W P-5'-P-CCNC: 11 U/L (ref 1–33)
ANION GAP SERPL CALCULATED.3IONS-SCNC: 9.4 MMOL/L (ref 5–15)
AST SERPL-CCNC: 27 U/L (ref 1–32)
BASOPHILS # BLD AUTO: 0.07 10*3/MM3 (ref 0–0.2)
BASOPHILS NFR BLD AUTO: 1.2 % (ref 0–1.5)
BILIRUB SERPL-MCNC: 0.4 MG/DL (ref 0–1.2)
BUN SERPL-MCNC: 15 MG/DL (ref 8–23)
BUN/CREAT SERPL: 17.4 (ref 7–25)
CALCIUM SPEC-SCNC: 9.1 MG/DL (ref 8.6–10.5)
CHLORIDE SERPL-SCNC: 102 MMOL/L (ref 98–107)
CHOLEST SERPL-MCNC: 222 MG/DL (ref 0–200)
CO2 SERPL-SCNC: 24.6 MMOL/L (ref 22–29)
CREAT SERPL-MCNC: 0.86 MG/DL (ref 0.57–1)
DEPRECATED RDW RBC AUTO: 39.1 FL (ref 37–54)
EGFRCR SERPLBLD CKD-EPI 2021: 66.7 ML/MIN/1.73
EOSINOPHIL # BLD AUTO: 0.24 10*3/MM3 (ref 0–0.4)
EOSINOPHIL NFR BLD AUTO: 4 % (ref 0.3–6.2)
ERYTHROCYTE [DISTWIDTH] IN BLOOD BY AUTOMATED COUNT: 11.9 % (ref 12.3–15.4)
GLOBULIN UR ELPH-MCNC: 2.8 GM/DL
GLUCOSE SERPL-MCNC: 85 MG/DL (ref 65–99)
HCT VFR BLD AUTO: 35 % (ref 34–46.6)
HDLC SERPL-MCNC: 64 MG/DL (ref 40–60)
HGB BLD-MCNC: 11.8 G/DL (ref 12–15.9)
IMM GRANULOCYTES # BLD AUTO: 0.03 10*3/MM3 (ref 0–0.05)
IMM GRANULOCYTES NFR BLD AUTO: 0.5 % (ref 0–0.5)
LDLC SERPL CALC-MCNC: 138 MG/DL (ref 0–100)
LDLC/HDLC SERPL: 2.11 {RATIO}
LYMPHOCYTES # BLD AUTO: 1.91 10*3/MM3 (ref 0.7–3.1)
LYMPHOCYTES NFR BLD AUTO: 32 % (ref 19.6–45.3)
MCH RBC QN AUTO: 30.8 PG (ref 26.6–33)
MCHC RBC AUTO-ENTMCNC: 33.7 G/DL (ref 31.5–35.7)
MCV RBC AUTO: 91.4 FL (ref 79–97)
MONOCYTES # BLD AUTO: 0.65 10*3/MM3 (ref 0.1–0.9)
MONOCYTES NFR BLD AUTO: 10.9 % (ref 5–12)
NEUTROPHILS NFR BLD AUTO: 3.06 10*3/MM3 (ref 1.7–7)
NEUTROPHILS NFR BLD AUTO: 51.4 % (ref 42.7–76)
NRBC BLD AUTO-RTO: 0 /100 WBC (ref 0–0.2)
PLATELET # BLD AUTO: 246 10*3/MM3 (ref 140–450)
PMV BLD AUTO: 9.6 FL (ref 6–12)
POTASSIUM SERPL-SCNC: 4.3 MMOL/L (ref 3.5–5.2)
PROT SERPL-MCNC: 7.1 G/DL (ref 6–8.5)
RBC # BLD AUTO: 3.83 10*6/MM3 (ref 3.77–5.28)
SODIUM SERPL-SCNC: 136 MMOL/L (ref 136–145)
T4 FREE SERPL-MCNC: 0.96 NG/DL (ref 0.92–1.68)
TRIGL SERPL-MCNC: 114 MG/DL (ref 0–150)
TSH SERPL DL<=0.05 MIU/L-ACNC: 4.19 UIU/ML (ref 0.27–4.2)
VLDLC SERPL-MCNC: 20 MG/DL (ref 5–40)
WBC NRBC COR # BLD AUTO: 5.96 10*3/MM3 (ref 3.4–10.8)

## 2025-07-15 PROCEDURE — 84439 ASSAY OF FREE THYROXINE: CPT

## 2025-07-15 PROCEDURE — 36415 COLL VENOUS BLD VENIPUNCTURE: CPT

## 2025-07-15 PROCEDURE — 82306 VITAMIN D 25 HYDROXY: CPT

## 2025-07-15 PROCEDURE — 85025 COMPLETE CBC W/AUTO DIFF WBC: CPT

## 2025-07-15 PROCEDURE — 80053 COMPREHEN METABOLIC PANEL: CPT

## 2025-07-15 PROCEDURE — 80061 LIPID PANEL: CPT

## 2025-07-15 PROCEDURE — 84443 ASSAY THYROID STIM HORMONE: CPT

## 2025-07-21 ENCOUNTER — LAB (OUTPATIENT)
Dept: LAB | Facility: HOSPITAL | Age: 85
End: 2025-07-21
Payer: MEDICARE

## 2025-07-21 ENCOUNTER — TRANSCRIBE ORDERS (OUTPATIENT)
Dept: LAB | Facility: HOSPITAL | Age: 85
End: 2025-07-21
Payer: MEDICARE

## 2025-07-21 DIAGNOSIS — M81.0 SENILE OSTEOPOROSIS: ICD-10-CM

## 2025-07-21 DIAGNOSIS — Z79.899 POLYPHARMACY: ICD-10-CM

## 2025-07-21 DIAGNOSIS — M81.0 SENILE OSTEOPOROSIS: Primary | ICD-10-CM

## 2025-07-21 LAB
MAGNESIUM SERPL-MCNC: 2.1 MG/DL (ref 1.6–2.4)
PHOSPHATE SERPL-MCNC: 2.8 MG/DL (ref 2.5–4.5)

## 2025-07-21 PROCEDURE — 83735 ASSAY OF MAGNESIUM: CPT

## 2025-07-21 PROCEDURE — 84100 ASSAY OF PHOSPHORUS: CPT

## 2025-07-21 PROCEDURE — 36415 COLL VENOUS BLD VENIPUNCTURE: CPT

## 2025-07-22 ENCOUNTER — OFFICE VISIT (OUTPATIENT)
Dept: ORTHOPEDIC SURGERY | Facility: CLINIC | Age: 85
End: 2025-07-22
Payer: MEDICARE

## 2025-07-22 VITALS — OXYGEN SATURATION: 95 % | DIASTOLIC BLOOD PRESSURE: 88 MMHG | SYSTOLIC BLOOD PRESSURE: 174 MMHG | HEART RATE: 68 BPM

## 2025-07-22 DIAGNOSIS — M25.561 RIGHT KNEE PAIN, UNSPECIFIED CHRONICITY: Primary | ICD-10-CM

## 2025-07-22 PROCEDURE — 99213 OFFICE O/P EST LOW 20 MIN: CPT | Performed by: ORTHOPAEDIC SURGERY

## 2025-07-22 PROCEDURE — 3079F DIAST BP 80-89 MM HG: CPT | Performed by: ORTHOPAEDIC SURGERY

## 2025-07-22 PROCEDURE — 3077F SYST BP >= 140 MM HG: CPT | Performed by: ORTHOPAEDIC SURGERY

## 2025-07-22 NOTE — PROGRESS NOTES
Chief Complaint  Follow-up of the Right Knee     Subjective      Breanna York presents to [facility] for follow up of the right knee.  She had a right total knee arthroplasty on 10/16/23. She is doing well.  She continues exercises at home and is not limited in activity.      Allergies   Allergen Reactions    Ace Inhibitors Angioedema     This occurred after some insect bites, but still need to defer this class of medications going forward    Meperidine Hallucinations    Latex Rash        Social History     Socioeconomic History    Marital status:    Tobacco Use    Smoking status: Never    Smokeless tobacco: Never   Vaping Use    Vaping status: Never Used   Substance and Sexual Activity    Alcohol use: Never    Drug use: Never    Sexual activity: Not Currently     Comment:         I reviewed the patient's chief complaint, history of present illness, review of systems, past medical history, surgical history, family history, social history, medications, and allergy list.     Review of Systems     Constitutional: Denies fevers, chills, weight loss  Cardiovascular: Denies chest pain, shortness of breath  Skin: Denies rashes, acute skin changes  Neurologic: Denies headache, loss of consciousness        Vital Signs:   /88   Pulse 68   SpO2 95%          Physical Exam  General: Alert. No acute distress    Ortho Exam           RIGHT KNEE Flexion Well healed incision. 120. Extension 0. Stable to varus/valgus stress. Stable to anterior/posterior drawer. Neurovascularly intact. Positive EHL, FHL, HS and TA. Sensation intact to light touch all 5 nerves of the foot. Ambulates with Antalgic gait. Patella is well tracking. Calf supple, non-tender.  Good strength to hamstrings, quadriceps, dorsiflexors, and plantar flexors.  Knee Extensor Mechanism intact       Procedures      Imaging Results (Most Recent)       Procedure Component Value Units Date/Time    XR Knee 3 View Right - In  process [[id number]] Resulted: 07/22/25 1258     Updated: 07/22/25 1258    This result has not been signed. Information might be incomplete.               Result Review :     X-Ray Report:  Right knee X-Ray  Indication: Evaluation of the right knee  AP/Lateral and [place] view(s)  Findings: Intact right total knee arthroplasty.  No signs of loosening, subsidence or periprosthetic fracture.   Prior studies available for comparison: yes             Assessment and Plan     Diagnoses and all orders for this visit:    1. Right knee pain, unspecified chronicity (Primary)  -     XR Knee 3 View Right        Discussed the treatment plan with the patient. I reviewed the X-rays that were obtained today with the patient.     Modify activity as needed.    Patient is doing well. Continue home exercise program to progress strength and ROM.     Discussed the importance of lifelong antibiotic prophylaxis with dental procedures following total joint replacement. In the event of a dental procedure, patient is welcome to contact our office to obtain antibiotics prior to the procedure if their dentist does not provide the prescription.     We also discussed that if a fall/injury were to occur to the affected extremity was advised for patient to obtain x-rays for clarification that no hardware has been compromised or if showing signs of loosening.    Patient verbalized understanding.       Call or return if worsening symptoms.    Follow Up     2-3 years with new X ray.        Patient was given instructions and counseling regarding her condition or for health maintenance advice. Please see specific information pulled into the AVS if appropriate.     Scribed for Candice Vo MD by Elidia Pineda MA.  07/22/25   12:58 EDT    I have personally performed the services described in this document as scribed by the above individual and it is both accurate and complete. Candice Vo MD 07/22/25

## 2025-07-25 ENCOUNTER — TELEPHONE (OUTPATIENT)
Age: 85
End: 2025-07-25
Payer: MEDICARE

## 2025-07-25 DIAGNOSIS — R53.83 OTHER FATIGUE: ICD-10-CM

## 2025-07-25 DIAGNOSIS — I10 ESSENTIAL HYPERTENSION: Primary | ICD-10-CM

## 2025-07-25 NOTE — TELEPHONE ENCOUNTER
I put in for her to repeat a TSH and a BMP just prior to that appointment, she does not have to fast for these.

## 2025-07-25 NOTE — TELEPHONE ENCOUNTER
Pt was doing labs for Rheumatology and they pulled all labs that she was to do in October, do you want her to have more labs prior to October visit?

## 2025-08-19 ENCOUNTER — TRANSCRIBE ORDERS (OUTPATIENT)
Dept: ADMINISTRATIVE | Facility: HOSPITAL | Age: 85
End: 2025-08-19
Payer: MEDICARE

## 2025-08-19 DIAGNOSIS — Z79.899 ENCOUNTER FOR LONG-TERM (CURRENT) USE OF MEDICATIONS: ICD-10-CM

## 2025-08-19 DIAGNOSIS — M81.0 SENILE OSTEOPOROSIS: Primary | ICD-10-CM

## 2025-08-20 DIAGNOSIS — M81.0 POSTMENOPAUSAL OSTEOPOROSIS: Primary | ICD-10-CM

## 2025-08-20 DIAGNOSIS — Z79.899 ENCOUNTER FOR LONG-TERM (CURRENT) USE OF MEDICATIONS: ICD-10-CM

## 2025-08-20 RX ORDER — ZOLEDRONIC ACID 0.05 MG/ML
5 INJECTION, SOLUTION INTRAVENOUS ONCE
OUTPATIENT
Start: 2025-08-20

## 2025-08-26 ENCOUNTER — HOSPITAL ENCOUNTER (OUTPATIENT)
Dept: INFUSION THERAPY | Facility: HOSPITAL | Age: 85
Discharge: HOME | End: 2025-08-26
Payer: MEDICARE

## 2025-08-26 VITALS
TEMPERATURE: 97.9 F | HEIGHT: 62 IN | HEART RATE: 95 BPM | DIASTOLIC BLOOD PRESSURE: 66 MMHG | RESPIRATION RATE: 20 BRPM | WEIGHT: 131.84 LBS | SYSTOLIC BLOOD PRESSURE: 132 MMHG | OXYGEN SATURATION: 95 % | BODY MASS INDEX: 24.26 KG/M2

## 2025-08-26 DIAGNOSIS — M81.0 POSTMENOPAUSAL OSTEOPOROSIS: Primary | ICD-10-CM

## 2025-08-26 LAB
CALCIUM SPEC-SCNC: 9.3 MG/DL (ref 8.6–10.5)
CREAT SERPL-MCNC: 0.86 MG/DL (ref 0.57–1)
EGFRCR SERPLBLD CKD-EPI 2021: 66.7 ML/MIN/1.73

## 2025-08-26 PROCEDURE — 82565 ASSAY OF CREATININE: CPT

## 2025-08-26 PROCEDURE — 96365 THER/PROPH/DIAG IV INF INIT: CPT

## 2025-08-26 PROCEDURE — 36415 COLL VENOUS BLD VENIPUNCTURE: CPT

## 2025-08-26 PROCEDURE — 25010000002 ZOLEDRONIC ACID 5 MG/100ML SOLUTION

## 2025-08-26 PROCEDURE — 96374 THER/PROPH/DIAG INJ IV PUSH: CPT

## 2025-08-26 PROCEDURE — 82310 ASSAY OF CALCIUM: CPT

## 2025-08-26 RX ORDER — ZOLEDRONIC ACID 0.05 MG/ML
5 INJECTION, SOLUTION INTRAVENOUS ONCE
Status: COMPLETED | OUTPATIENT
Start: 2025-08-26 | End: 2025-08-26

## 2025-08-26 RX ORDER — ZOLEDRONIC ACID 0.05 MG/ML
5 INJECTION, SOLUTION INTRAVENOUS ONCE
Status: CANCELLED | OUTPATIENT
Start: 2025-08-26

## 2025-08-26 RX ADMIN — ZOLEDRONIC ACID 5 MG: 5 INJECTION, SOLUTION INTRAVENOUS at 14:18

## (undated) DEVICE — GLV SURG SENSICARE SLT PF LF 8.5 STRL

## (undated) DEVICE — CVR LEG BOOTLEG F/R NOSKID 33IN

## (undated) DEVICE — NO-SCRATCH ™ SMALL WHITNEY CURETTE ™ IS A SINGLE-USE, PLASTIC CURETTE FOR QUICKLY APPLYING, MANIPULATING AND REMOVING BONE CEMENT DURING HIP AND KNEE REPLACEMENT SURGERY. THE PLASTIC IS SOFTER THAN STEEL INSTRUMENTS, REDUCING THE RISK OF DAMAGING THE PROSTHESIS WITH METAL INSTRUMENTS.  THE CURETTE’S 6MM TIP REMOVES EXCESS CEMENT FROM REPLACEMENT HIPS AND KNEES. EASY-TO-MANEUVER, THE SMALL BLUE CURETTE LETS YOU REMOVE CEMENT FROM ALL EDGES OF THE PROSTHESIS.NO-SCRATCH WHITNEY SMALL CURETTE FEATURES:SAFER THAN STEEL- MADE OF PLASTIC - STURDY YET SOFTER THAN SURGICAL STEEL.HANDIER- EACH TOOL HAS A MOLDED-IN THUMB INDENTATION INSTANTLY ORIENTING THE TOOL.- EASIER TO MANEUVER IN HARD TO SEE PLACES.- COLOR-CODED FOR EASY IDENTIFICATION.FASTER- COMES INDIVIDUALLY PACKAGED IN STERILE, PEEL OPEN POUCH, READY TO GO.- APPLIES, MANIPULATES, OR REMOVES CEMENT WITH FINGERTIP PRECISION.ECONOMICAL- THE COST OF A SINGLE REVISION DWARFS THE COST OF A SINGLE-USE CURETTE. - DISPOSABLE – THERE’S NO NEED TO WASTE TIME REMOVING HARDENED CEMENT OR RE-STERILIZING TOOLS.- LESS EXPENSIVE TO BUY AND INVENTORY - ORDER ONLY THE TOOL YOU USE.- PACKAGED 25 INDIVIDUALLY WRAPPED TOOLS TO A CARTON FOR CONVENIENT SHELF STORAGE.: Brand: WHITNEY NO-SCRATCH CURETTE (SMALL)

## (undated) DEVICE — DISPOSABLE TOURNIQUET CUFF 30"X4", 1-LINE, WHITE, STERILE, 1EA/PK, 10PK/CS: Brand: ASP MEDICAL

## (undated) DEVICE — MAT FLR ABS W/BLU/LINER 56X72IN WHT

## (undated) DEVICE — PULLOVER TOGA, 2X LARGE: Brand: FLYTE, SURGICOOL

## (undated) DEVICE — PROXIMATE RH ROTATING HEAD SKIN STAPLERS (35 WIDE) CONTAINS 35 STAINLESS STEEL STAPLES: Brand: PROXIMATE

## (undated) DEVICE — GAUZE,SPONGE,4"X4",16PLY,STRL,LF,10/TRAY: Brand: MEDLINE

## (undated) DEVICE — 3 BONE CEMENT MIXER: Brand: MIXEVAC

## (undated) DEVICE — SOL IRR NACL 0.9PCT 3000ML

## (undated) DEVICE — BASIC SINGLE BASIN-LF: Brand: MEDLINE INDUSTRIES, INC.

## (undated) DEVICE — SUT VIC 2/0 CT1 36IN

## (undated) DEVICE — GLV SURG SENSICARE PI PF LF 7 GRN STRL

## (undated) DEVICE — APPL CHLORAPREP HI/LITE 26ML ORNG

## (undated) DEVICE — STRYKER PERFORMANCE SERIES SAGITTAL BLADE: Brand: STRYKER PERFORMANCE SERIES

## (undated) DEVICE — GLV SURG BIOGEL LTX PF 8 1/2

## (undated) DEVICE — INTENDED FOR TISSUE SEPARATION, AND OTHER PROCEDURES THAT REQUIRE A SHARP SURGICAL BLADE TO PUNCTURE OR CUT.: Brand: BARD-PARKER ® CARBON RIB-BACK BLADES

## (undated) DEVICE — PLASMABLADE PS200-040 4.0: Brand: PLASMABLADE™

## (undated) DEVICE — SYR LUERLOK 30CC

## (undated) DEVICE — TOWEL,OR,DSP,ST,BLUE,STD,4/PK,20PK/CS: Brand: MEDLINE

## (undated) DEVICE — GLV SURG SENSICARE SLT PF LF 6.5 STRL

## (undated) DEVICE — Device

## (undated) DEVICE — BNDG ELAS MATRX V/CLS 6INX10YD LF

## (undated) DEVICE — Device: Brand: PULSAVAC®

## (undated) DEVICE — SLV SCD KN/LEN ADJ EXPRSS BLENDED MD 1P/U

## (undated) DEVICE — NDL HYPO ECLPS SFTY 18G 1 1/2IN

## (undated) DEVICE — TOTAL KNEE-LF: Brand: MEDLINE INDUSTRIES, INC.

## (undated) DEVICE — ANTIBACTERIAL VIOLET BRAIDED (POLYGLACTIN 910), SYNTHETIC ABSORBABLE SURGICAL SUTURE: Brand: COATED VICRYL

## (undated) DEVICE — GLV SURG SENSICARE SLT PF LF 7 STRL

## (undated) DEVICE — UNDERCAST PADDING: Brand: DEROYAL